# Patient Record
Sex: MALE | Race: WHITE | NOT HISPANIC OR LATINO | Employment: FULL TIME | URBAN - METROPOLITAN AREA
[De-identification: names, ages, dates, MRNs, and addresses within clinical notes are randomized per-mention and may not be internally consistent; named-entity substitution may affect disease eponyms.]

---

## 2017-01-12 ENCOUNTER — TELEPHONE (OUTPATIENT)
Dept: FAMILY MEDICINE | Facility: CLINIC | Age: 57
End: 2017-01-12

## 2017-01-12 NOTE — TELEPHONE ENCOUNTER
RN hypertension panel management  Offer free BP CK with the RN  Left message for the patient to call the clinic.  Kristal ESCAMILLA RN

## 2017-01-12 NOTE — Clinical Note
January 13, 2017      Kwabena Richards  [address] [facility] [address]            Dear Kwabena,    Your most recent blood pressure reading preformed at our clinic was higher than we like to see it. The goal is to have it under 140/90 in clinic. Please call our clinic [phone] to schedule a blood pressure recheck on our RN schedule. This appointment is free of charge and takes about 15 minutes to complete. Be sure to take all of your blood pressure medications, and avoid stimulants like caffeine, cold medicines, sudafed, or tobacco prior to your recheck.    If your blood pressure medication was changed by your provider recently wait 2 weeks before making this recheck appointment.     Thank you for trusting us with your health care.  Sincerely,    ARCHANA Evangelista / Kristal ESCAMILLA RN

## 2017-02-16 ENCOUNTER — OFFICE VISIT (OUTPATIENT)
Dept: FAMILY MEDICINE | Facility: CLINIC | Age: 57
End: 2017-02-16
Payer: COMMERCIAL

## 2017-02-16 ENCOUNTER — OFFICE VISIT (OUTPATIENT)
Dept: BEHAVIORAL HEALTH | Facility: CLINIC | Age: 57
End: 2017-02-16
Payer: COMMERCIAL

## 2017-02-16 VITALS
BODY MASS INDEX: 33.59 KG/M2 | DIASTOLIC BLOOD PRESSURE: 86 MMHG | TEMPERATURE: 100.3 F | WEIGHT: 209 LBS | HEART RATE: 72 BPM | SYSTOLIC BLOOD PRESSURE: 136 MMHG | HEIGHT: 66 IN

## 2017-02-16 DIAGNOSIS — Z12.11 COLON CANCER SCREENING: ICD-10-CM

## 2017-02-16 DIAGNOSIS — I10 HYPERTENSION, GOAL BELOW 140/90: ICD-10-CM

## 2017-02-16 DIAGNOSIS — F33.0 MAJOR DEPRESSIVE DISORDER, RECURRENT EPISODE, MILD (H): Primary | ICD-10-CM

## 2017-02-16 DIAGNOSIS — F43.23 ADJUSTMENT DISORDER WITH MIXED ANXIETY AND DEPRESSED MOOD: Primary | ICD-10-CM

## 2017-02-16 PROCEDURE — 99214 OFFICE O/P EST MOD 30 MIN: CPT | Performed by: NURSE PRACTITIONER

## 2017-02-16 PROCEDURE — 90791 PSYCH DIAGNOSTIC EVALUATION: CPT | Performed by: SOCIAL WORKER

## 2017-02-16 RX ORDER — BUPROPION HYDROCHLORIDE 150 MG/1
150 TABLET ORAL EVERY MORNING
Qty: 30 TABLET | Refills: 11 | Status: SHIPPED | OUTPATIENT
Start: 2017-02-16 | End: 2018-02-07

## 2017-02-16 RX ORDER — LATANOPROST 50 UG/ML
1 SOLUTION/ DROPS OPHTHALMIC EVERY MORNING
COMMUNITY
Start: 2017-02-04 | End: 2024-06-07

## 2017-02-16 ASSESSMENT — ANXIETY QUESTIONNAIRES
7. FEELING AFRAID AS IF SOMETHING AWFUL MIGHT HAPPEN: NOT AT ALL
GAD7 TOTAL SCORE: 2
5. BEING SO RESTLESS THAT IT IS HARD TO SIT STILL: NOT AT ALL
1. FEELING NERVOUS, ANXIOUS, OR ON EDGE: NOT AT ALL
6. BECOMING EASILY ANNOYED OR IRRITABLE: SEVERAL DAYS
3. WORRYING TOO MUCH ABOUT DIFFERENT THINGS: SEVERAL DAYS
IF YOU CHECKED OFF ANY PROBLEMS ON THIS QUESTIONNAIRE, HOW DIFFICULT HAVE THESE PROBLEMS MADE IT FOR YOU TO DO YOUR WORK, TAKE CARE OF THINGS AT HOME, OR GET ALONG WITH OTHER PEOPLE: SOMEWHAT DIFFICULT
2. NOT BEING ABLE TO STOP OR CONTROL WORRYING: NOT AT ALL

## 2017-02-16 ASSESSMENT — PATIENT HEALTH QUESTIONNAIRE - PHQ9: 5. POOR APPETITE OR OVEREATING: NOT AT ALL

## 2017-02-16 NOTE — NURSING NOTE
"Chief Complaint   Patient presents with     Depression     Hypertension       Initial /90 (BP Location: Left arm, Cuff Size: Adult Large)  Pulse 72  Temp 100.3  F (37.9  C) (Oral)  Ht 5' 5.75\" (1.67 m)  Wt 209 lb (94.8 kg)  BMI 33.99 kg/m2 Estimated body mass index is 33.99 kg/(m^2) as calculated from the following:    Height as of this encounter: 5' 5.75\" (1.67 m).    Weight as of this encounter: 209 lb (94.8 kg).  Medication Reconciliation: complete  "

## 2017-02-16 NOTE — PROGRESS NOTES
"[facility]  Integrated Behavioral Health Services   Diagnostic Assessment      PATIENT'S NAME: Kwabena Richards  MRN:   [id number]  :   1960  DATE OF SERVICE: 2017  SERVICE LOCATION: Face to Face in Clinic  Visit Activities: [facility], [facility] Only and Warm-handoff       Identifying Information:  Patient is a 56 year old year old, ,  male.  Patient attended the session alone.        Referral:  Patient was referred for an assessment by PCP at [facility].   Reason for referral: clarify behavioral health diagnosis, determine behavioral health treatment options, assess client readiness and motivation to change, assess client social situation and address the interaction of behavioral and medical issues.       Patient's Statement of Presenting Concern:  Patient reports the following reason(s) for seeking an assessment at this time: due to increased anger/irritability at work.  Patient stated that his symptoms have resulted in the following functional impairments: health maintenance, relationship(s), social interactions and work / vocational responsibilities      History of Presenting Concern:  Patient reports that these problem(s) began after his father  when pt was 27 yrs old.  Recently relationships have changed in his workplace is that and that has led to increased irritation and frustration. Patient has attempted to resolve these concerns in the past through: counseling and physician / PCP. Patient reports that other professional(s) are involved in providing support / services. His primary care provider      Social History:  Patient reported he grew up in [place], [place]. He is the only child and has 2 stepsisters and one stepbrother.  Patient reported that his childhood was \"nice\".  Patient reported a history of one committed relationships or marriages. Patient has been  for 30 years. Patient reported having 3 children. Patient " identified some stable and meaningful social connections.     Patient lives with Wife.  Patient is currently employed full time.  Work history [employer] -20+ years    Patient reported that he has not been involved with the legal system.  Patient's highest education level was high school graduate and some college. Patient did not identify any learning problems. Patient did not serve in the .       Mental Health History:  Patient reported the following biological family members or relatives with mental health issues: Father-possibly depression. Patient has received the following mental health services in the past: counseling and medication(s) from physician / PCP. Patient is currently receiving the following services: physician / PCP, medication(s) from physician / PCP and primary care behavioral health provider.      Chemical Health History:  Patient reported no family history of chemical health issues. Patient has not received chemical dependency treatment in the past. Patient is not currently receiving any chemical dependency treatment. Patient reports no problems as a result of their drinking / drug use.      Cage-AID score is: Negative. Based on Cage-Aid score and clinical interview there  are not indications of drug or alcohol abuse.      Discussed the general effects of drugs and alcohol on health and well-being.      Significant Losses / Trauma / Abuse / Neglect Issues:  There are indications or report of significant loss, trauma, abuse or neglect issues related to: death of Father age 56 of cancer-patient was 27 years old.    Issues of possible neglect are not present.      Medical History:     See patient medical record for problem list and current medications.      Medication Adherence:  Client reports taking prescribed medications as prescribed.    Patient was provided recommendation to follow-up with physician.    Mental Status Assessment:  Appearance:   Appropriate   Eye  Contact:   Good   Psychomotor Behavior: Normal   Attitude:   Cooperative   Orientation:   All  Speech   Rate / Production: Normal    Volume:  Loud   Mood:    Anxious  Normal Sad   Affect:    Appropriate  tearful   Thought Content:  Clear   Thought Form:  Coherent  Logical   Insight:    Good       Safety Issues and Plan for Safety and Risk Management:  Patient denies a history of suicidal ideation, suicide attempts, self-injurious behavior, homicidal ideation, homicidal behavior and and other safety concerns  Patient denies current fears or concerns for personal safety.  Patient denies current or recent suicidal ideation or behaviors.  Patient denies current or recent homicidal ideation or behaviors.  Patient denies current or recent self injurious behavior or ideation.  Patient denies other safety concerns.  Patient reports there are firearms in the house. The firearms are secured in a locked space  A safety and risk management plan has not been developed at this time, however client was given the after-hours number / 911 should there be a change in any of these risk factors.        Review of Symptoms:  Depression: Energy Hopeless  Nina:  No symptoms  Psychosis: No symptoms  Anxiety: Worries Irritability-increased, many worry thoughts  Panic:  No symptoms  Post Traumatic Stress Disorder: No symptoms  Obsessive Compulsive Disorder: No symptoms  Eating Disorder: No symptoms  Oppositional Defiant Disorder: No symptoms  ADD / ADHD: No symptoms  Conduct Disorder: No symptoms    Patient's Strengths and Limitations:  Client identified the following strengths or resources that will help him succeed in counseling: commitment to health and well being, friends / good social support, family support, intelligence and sense of humor. Client identified the following supports: family and friends. Things that may interfere with the clients success in counseling include:lack of social support.    Diagnostic Criteria:  A. The  development of emotional or behavioral symptoms in response to an identifiable stressor(s) occurring within 3 months of the onset of the stressor(s)  B. These symptoms or behaviors are clinically significant, as evidenced by one or both of the following:  C. The stress-related disturbance does not meet criteria for another disorder & is not not an exacerbation of another mental disorder  D. The symptoms do not represent normal bereavement  E. Once the stressor or its consequences have terminated, the symptoms do not persist for more than an additional 6 months       * Adjustment Disorder with Mixed Anxiety and Depressed Mood: The predominant manifestation is a combination of depression and anxiety      Functional Status:  Client's symptoms have caused and are causing reduced functional status in the following areas: Occupational / Vocational - Conflicts in the workplace      DSM5 Diagnoses: (Sustained by DSM5 Criteria Listed Above)  Diagnoses: Adjustment Disorders  309.28 (F43.23) With mixed anxiety and depressed mood  Psychosocial & Contextual Factors: Recent conflicts in the workplace  WHODAS Score: 19  See Media section of EPIC medical record for completed WHODAS    Preliminary Treatment Plan:    Initial Treatment will focus on: Depressed Mood - Decrease  Anxiety - Decrease  Adjustment Difficulties related to: Relationships and work environment  Relational Problems related to: Conflict or difficulties with Cowork[employer]  Anger Management - Increase coping behaviors.    Chemical dependency recommendations: No indications of CD issues    As a preliminary treatment goal, patient will experience a reduction in depressed mood, will develop more effective coping skills to manage depressive symptoms, will develop healthy cognitive patterns and beliefs, will increase ability to function adaptively and will continue to take medications as prescribed / participate in supportive activities and services , will experience a  reduction in anxiety, will develop more effective coping skills to manage anxiety symptoms, will develop healthy cognitive patterns and beliefs and will increase ability to function adaptively, will develop coping/problem-solving skills to facilitate more adaptive adjustment, will address relationship difficulties in a more adaptive manner and will learn strategies to resolve conflict adaptively and will learn and practice positive anger management skills .    The focus of initial interventions will be to alleviate anxiety, alleviate depressed mood, facilitate appropriate expression of feelings, teach anger management techniques, teach communication skills, teach conflict management skills, teach relaxation strategies, teach social skills and teach stress mangement techniques.    The client is receiving treatment / structured support from the following professional(s) / service and treatment. Collaboration will be initiated with: primary care physician.    The following referral(s) was/were discussed but client declines follow up at this time. Assess as needed.    A Release of Information is not needed at this time.    Report to child or adult protection services was SONNY Melissa (Mindy),[facility] Behavioral Health Clinician

## 2017-02-16 NOTE — PROGRESS NOTES
"  SUBJECTIVE:                                                    Kwabena Richards is a 56 year old male who presents to clinic today for the following health issues:      Hypertension Follow-up      Outpatient blood pressures are not being checked.    Low Salt Diet:  Tries to stay away from salt       Depression Followup    Status since last visit: Worsened     See PHQ-9 for current symptoms.  Other associated symptoms: Anger issues- at work    No anger issues in any other situation - only at work.    \"flew off the handle\" last week and got sent home.    Has been on cymbalta for years with good control.    Complicating factors:   Significant life event:  No   Current substance abuse:  None  Anxiety or Panic symptoms:  No             Amount of exercise or physical activity: None    Problems taking medications regularly: No    Medication side effects: none          Problem list and histories reviewed & adjusted, as indicated.  Additional history: as documented    Problem list, Medication list, Allergies, and Medical/Social/Surgical histories reviewed in EPIC and updated as appropriate.    ROS:  Constitutional, HEENT, cardiovascular, pulmonary, gi and gu systems are negative, except as otherwise noted.    OBJECTIVE:                                                    /86  Pulse 72  Temp 100.3  F (37.9  C) (Oral)  Ht 5' 5.75\" (1.67 m)  Wt 209 lb (94.8 kg)  BMI 33.99 kg/m2  Body mass index is 33.99 kg/(m^2).  GENERAL: healthy, alert and no distress  RESP: lungs clear to auscultation - no rales, rhonchi or wheezes  CV: regular rate and rhythm, normal S1 S2, no S3 or S4, no murmur, click or rub, no peripheral edema and peripheral pulses strong  PSYCH: mentation appears normal, affect normal/bright    PHQ-9 SCORE 1/11/2016 8/29/2016 2/16/2017   Total Score - - -   Total Score 2 1 2     VISHNU-7 SCORE 1/11/2016 8/29/2016 2/16/2017   Total Score - - -   Total Score 0 1 2            ASSESSMENT/PLAN:                             "                            ICD-10-CM    1. Major depressive disorder, recurrent episode, mild (H) F33.0 Poorly controlled - anger issues at work.  Continue Cymbalta  Add buPROPion (WELLBUTRIN XL) 150 MG 24 hr tablet     MENTAL HEALTH REFERRAL - was able to get an appointment with Kristi today.     2. Hypertension, goal below 140/90 I10 Well controlled.     3. Colon cancer screening Z12.11 GASTROENTEROLOGY ADULT REF PROCEDURE ONLY       Patient Instructions   Continue Cymbalta  Add Wellbutrin 150 mg daily  Make appointment for counseling with Kristi Person.    Follow up with me in one month if no improvement.        The risks, benefits and treatment options of prescribed medications or other treatments have been discussed with the patient. The patient verbalized their understanding and should call or follow up if no improvement or if they develop further problems.    STU Maya [facility]

## 2017-02-16 NOTE — MR AVS SNAPSHOT
After Visit Summary   2/16/2017    Kwabena Richards    MRN: [id number]           Patient Information     Date Of Birth          1960        Visit Information        Provider Department      2/16/2017 8:00 AM Shraddha Loyd APRN [facility]        Today's Diagnoses     Major depressive disorder, recurrent episode, mild (H)    -  1    Hypertension, goal below 140/90        Colon cancer screening          Care Instructions    Continue Cymbalta  Add Wellbutrin 150 mg daily  Make appointment for counseling with Kristi Person.    Follow up with me in one month if no improvement.      Thank you for choosing [facility].  You may be receiving a survey in the mail from [employer] [facility] regarding your visit today.  Please take a few minutes to complete and return the survey to let us know how we are doing.      If you have questions or concerns, please contact us via [employer] or you can contact your care team at [phone].    Our Clinic hours are:  Monday 6:40 am  to 7:00 pm  Tuesday -Friday 6:40 am to 5:00 pm    The [place] outpatient lab hours are:  Monday - Friday 6:10 am to 4:45 pm  Saturdays 7:00 am to 11:00 am  Appointments are required, call [phone]    If you have clinical questions after hours or would like to schedule an appointment,  call the clinic at [phone].          Follow-ups after your visit        Additional Services     GASTROENTEROLOGY ADULT REF PROCEDURE ONLY       Last Lab Result: Creatinine (mg/dL)       Date                     Value                 06/02/2016               0.81             ----------  Body mass index is 33.99 kg/(m^2).     Needed:  No  Language:  English    Patient will be contacted to schedule procedure.     Please be aware that coverage of these services is subject to the terms and limitations of your health insurance plan.  Call member services at your health plan with any benefit or coverage questions.  Any  procedures must be performed at a [facility] facility OR coordinated by your clinic's referral office.    Please bring the following with you to your appointment:    (1) Any X-Rays, CTs or MRIs which have been performed.  Contact the facility where they were done to arrange for  prior to your scheduled appointment.    (2) List of current medications   (3) This referral request   (4) Any documents/labs given to you for this referral            MENTAL HEALTH REFERRAL       Your provider has referred you to: [facility]: [facility] - Counseling (Individual/Couples/Family) - [facility] [phone]   [email or web address]   *Patient will be contacted by [facility]'s scheduling partner, [facility]), to schedule an appointment.  Patients may also call [facility] to schedule.    All scheduling is subject to the client's specific insurance plan & benefits, provider/location availability, and provider clinical specialities.  Please arrive 15 minutes early for your first appointment and bring your completed paperwork.    Please be aware that coverage of these services is subject to the terms and limitations of your health insurance plan.  Call member services at your health plan with any benefit or coverage questions.                  Who to contact     If you have questions or need follow up information about today's clinic visit or your schedule please contact [facility] directly at [phone].  Normal or non-critical lab and imaging results will be communicated to you by MyChart, letter or phone within 4 business days after the clinic has received the results. If you do not hear from us within 7 days, please contact the clinic through MyChart or phone. If you have a critical or abnormal lab result, we will notify you by phone as soon as possible.  Submit refill requests through [employer] or call your pharmacy and  "they will forward the refill request to us. Please allow 3 business days for your refill to be completed.          Additional Information About Your Visit        [employer] Information     [employer] lets you send messages to your doctor, view your test results, renew your prescriptions, schedule appointments and more. To sign up, go to [email or web address] . Click on \"Log in\" on the left side of the screen, which will take you to the Welcome page. Then click on \"Sign up Now\" on the right side of the page.     You will be asked to enter the access code listed below, as well as some personal information. Please follow the directions to create your username and password.     Your access code is: [email or web address]  Expires: 2017  8:26 AM     Your access code will  in 90 days. If you need help or a new code, please call your [facility] clinic or [phone].        Care EveryWhere ID     This is your [facility] EveryWhere ID. This could be used by other organizations to access your [facility] medical records  [id number]        Your Vitals Were     Pulse Temperature Height BMI (Body Mass Index)          72 100.3  F (37.9  C) (Oral) 5' 5.75\" (1.67 m) 33.99 kg/m2         Blood Pressure from Last 3 Encounters:   17 136/90   16 150/84   16 133/83    Weight from Last 3 Encounters:   17 209 lb (94.8 kg)   16 214 lb (97.1 kg)   16 224 lb (101.6 kg)              We Performed the Following     GASTROENTEROLOGY ADULT REF PROCEDURE ONLY     MENTAL HEALTH REFERRAL          Today's Medication Changes          These changes are accurate as of: 17  8:26 AM.  If you have any questions, ask your nurse or doctor.               Start taking these medicines.        Dose/Directions    buPROPion 150 MG 24 hr tablet   Commonly known as:  WELLBUTRIN XL   Used for:  Major depressive disorder, recurrent episode, mild (H)   Started by:  Shraddha Loyd APRN CNP        Dose:  150 mg   Take 1 tablet " (150 mg) by mouth every morning   Quantity:  30 tablet   Refills:  11            Where to get your medicines      These medications were sent to [facility] [place], [place] - [address]  [address] [address]     Phone:  [phone]     buPROPion 150 MG 24 hr tablet                Primary Care Provider Office Phone # Fax #    Shraddha Batista, [facility]U [facility] [phone]       [facility] [address] [address]        Thank you!     Thank you for choosing [facility]  for your care. Our goal is always to provide you with excellent care. Hearing back from our patients is one way we can continue to improve our services. Please take a few minutes to complete the written survey that you may receive in the mail after your visit with us. Thank you!             Your Updated Medication List - Protect others around you: Learn how to safely use, store and throw away your medicines at www.disposemymeds.org.          This list is accurate as of: 2/16/17  8:26 AM.  Always use your most recent med list.                   Brand Name Dispense Instructions for use    aspirin 81 MG chewable tablet      (ON HOLD WHILE ON COUMADIN)1 TABLET BY MOUTH DAILY       buPROPion 150 MG 24 hr tablet    WELLBUTRIN XL    30 tablet    Take 1 tablet (150 mg) by mouth every morning       celecoxib 100 MG capsule    celeBREX    180 capsule    Take 2 capsules (200 mg) by mouth daily       Co Q 10 100 MG Caps      Take 1 capsule by mouth daily       DULoxetine 60 MG EC capsule    CYMBALTA    90 capsule    Take 1 capsule (60 mg) by mouth daily       EQL FISH OIL 1000 MG capsule   Generic drug:  fish oil-omega-3 fatty acids     180 capsule    Take 3 capsules by mouth daily.       fluticasone 50 MCG/ACT spray    FLONASE    3 Package    Spray 1-2 sprays into both nostrils daily       Garlic 2 MG Caps      Take 1 capsule by mouth daily       GLUCOSAMINE CHONDROITIN  COMPLX PO          latanoprost 0.005 % ophthalmic solution    XALATAN         losartan 50 MG tablet    COZAAR    90 tablet    Take 1 tablet (50 mg) by mouth daily       MULTIPLE VITAMIN PO      1 TABLET ORALLY DAILY       order for DME      Respironics Remstar 60 series auto CPAP 5-15 cm H2O       TYLENOL PM EXTRA STRENGTH PO      Take  by mouth as needed.       vitamin B complex with vitamin C Tabs tablet      Take 1 tablet by mouth daily       VITAMIN D PO          ZINC PO      1 TABLET ORALLY DAILY

## 2017-02-16 NOTE — MR AVS SNAPSHOT
"              After Visit Summary   2017    Kwabena Richards    MRN: [id number]           Patient Information     Date Of Birth          1960        Visit Information        Provider Department      2017 9:00 AM Gabby Person MSW [facility]        Today's Diagnoses     Adjustment disorder with mixed anxiety and depressed mood    -  1       Follow-ups after your visit        Who to contact     If you have questions or need follow up information about today's clinic visit or your schedule please contact [facility] directly at [phone].  Normal or non-critical lab and imaging results will be communicated to you by reQwiphart, letter or phone within 4 business days after the clinic has received the results. If you do not hear from us within 7 days, please contact the clinic through Emergent Viewst or phone. If you have a critical or abnormal lab result, we will notify you by phone as soon as possible.  Submit refill requests through Venuelabs or call your pharmacy and they will forward the refill request to us. Please allow 3 business days for your refill to be completed.          Additional Information About Your Visit        MyChart Information     Venuelabs lets you send messages to your doctor, view your test results, renew your prescriptions, schedule appointments and more. To sign up, go to [email or web address] . Click on \"Log in\" on the left side of the screen, which will take you to the Welcome page. Then click on \"Sign up Now\" on the right side of the page.     You will be asked to enter the access code listed below, as well as some personal information. Please follow the directions to create your username and password.     Your access code is: [email or web address]  Expires: 2017  8:26 AM     Your access code will  in 90 days. If you need help or a new code, please call your [facility] or [phone].        Care EveryWhere ID     This is your Care EveryWhere ID. " This could be used by other organizations to access your [place] medical records  [id number]         Blood Pressure from Last 3 Encounters:   02/16/17 136/86   08/29/16 150/84   03/29/16 133/83    Weight from Last 3 Encounters:   02/16/17 209 lb (94.8 kg)   08/29/16 214 lb (97.1 kg)   03/29/16 224 lb (101.6 kg)              Today, you had the following     No orders found for display         Today's Medication Changes          These changes are accurate as of: 2/16/17 11:30 AM.  If you have any questions, ask your nurse or doctor.               Start taking these medicines.        Dose/Directions    buPROPion 150 MG 24 hr tablet   Commonly known as:  WELLBUTRIN XL   Used for:  Major depressive disorder, recurrent episode, mild (H)   Started by:  Shraddha Loyd APRN CNP        Dose:  150 mg   Take 1 tablet (150 mg) by mouth every morning   Quantity:  30 tablet   Refills:  11            Where to get your medicines      These medications [facility] [place] [facility] [address]  [address] [address]     Phone:  [phone]     buPROPion 150 MG 24 hr tablet                Primary Care Provider Office Phone # Fax #    STU Younger [phone]       [facility] [address] [address]        Thank you!     Thank you for choosing [facility]  for your care. Our goal is always to provide you with excellent care. Hearing back from our patients is one way we can continue to improve our services. Please take a few minutes to complete the written survey that you may receive in the mail after your visit with us. Thank you!             Your Updated Medication List - Protect others around you: Learn how to safely use, store and throw away your medicines at www.disposemymeds.org.          This list is accurate as of: 2/16/17 11:30 AM.  Always use your most recent med list.                    Brand Name Dispense Instructions for use    aspirin 81 MG chewable tablet      (ON HOLD WHILE ON COUMADIN)1 TABLET BY MOUTH DAILY       buPROPion 150 MG 24 hr tablet    WELLBUTRIN XL    30 tablet    Take 1 tablet (150 mg) by mouth every morning       celecoxib 100 MG capsule    celeBREX    180 capsule    Take 2 capsules (200 mg) by mouth daily       Co Q 10 100 MG Caps      Take 1 capsule by mouth daily       DULoxetine 60 MG EC capsule    CYMBALTA    90 capsule    Take 1 capsule (60 mg) by mouth daily       EQL FISH OIL 1000 MG capsule   Generic drug:  fish oil-omega-3 fatty acids     180 capsule    Take 3 capsules by mouth daily.       fluticasone 50 MCG/ACT spray    FLONASE    3 Package    Spray 1-2 sprays into both nostrils daily       Garlic 2 MG Caps      Take 1 capsule by mouth daily       GLUCOSAMINE CHONDROITIN COMPLX PO          latanoprost 0.005 % ophthalmic solution    XALATAN         losartan 50 MG tablet    COZAAR    90 tablet    Take 1 tablet (50 mg) by mouth daily       MULTIPLE VITAMIN PO      1 TABLET ORALLY DAILY       order for DME      Respironics Remstar 60 series auto CPAP 5-15 cm H2O       TYLENOL PM EXTRA STRENGTH PO      Take  by mouth as needed.       vitamin B complex with vitamin C Tabs tablet      Take 1 tablet by mouth daily       VITAMIN D PO          ZINC PO      1 TABLET ORALLY DAILY

## 2017-02-16 NOTE — PATIENT INSTRUCTIONS
Continue Cymbalta  Add Wellbutrin 150 mg daily  Make appointment for counseling with Kristi Person.    Follow up with me in one month if no improvement.      Thank you for choosing [facility].  You may be receiving a survey in the mail from Moira Coy regarding your visit today.  Please take a few minutes to complete and return the survey to let us know how we are doing.      If you have questions or concerns, please contact us via Grovac or you can contact your care team at [phone].    Our Clinic hours are:  Monday 6:40 am  to 7:00 pm  Tuesday -Friday 6:40 am to 5:00 pm    The [place] outpatient lab hours are:  Monday - Friday 6:10 am to 4:45 pm  Saturdays 7:00 am to 11:00 am  Appointments are required, call [phone]    If you have clinical questions after hours or would like to schedule an appointment,  call the clinic at [phone].

## 2017-02-17 ASSESSMENT — PATIENT HEALTH QUESTIONNAIRE - PHQ9: SUM OF ALL RESPONSES TO PHQ QUESTIONS 1-9: 2

## 2017-02-17 ASSESSMENT — ANXIETY QUESTIONNAIRES: GAD7 TOTAL SCORE: 2

## 2017-02-27 ENCOUNTER — OFFICE VISIT (OUTPATIENT)
Dept: BEHAVIORAL HEALTH | Facility: CLINIC | Age: 57
End: 2017-02-27
Payer: COMMERCIAL

## 2017-02-27 DIAGNOSIS — F43.23 ADJUSTMENT DISORDER WITH MIXED ANXIETY AND DEPRESSED MOOD: Primary | ICD-10-CM

## 2017-02-27 PROCEDURE — 90832 PSYTX W PT 30 MINUTES: CPT | Performed by: SOCIAL WORKER

## 2017-02-27 ASSESSMENT — ANXIETY QUESTIONNAIRES
IF YOU CHECKED OFF ANY PROBLEMS ON THIS QUESTIONNAIRE, HOW DIFFICULT HAVE THESE PROBLEMS MADE IT FOR YOU TO DO YOUR WORK, TAKE CARE OF THINGS AT HOME, OR GET ALONG WITH OTHER PEOPLE: NOT DIFFICULT AT ALL
7. FEELING AFRAID AS IF SOMETHING AWFUL MIGHT HAPPEN: NOT AT ALL
GAD7 TOTAL SCORE: 0
2. NOT BEING ABLE TO STOP OR CONTROL WORRYING: NOT AT ALL
5. BEING SO RESTLESS THAT IT IS HARD TO SIT STILL: NOT AT ALL
1. FEELING NERVOUS, ANXIOUS, OR ON EDGE: NOT AT ALL
3. WORRYING TOO MUCH ABOUT DIFFERENT THINGS: NOT AT ALL
6. BECOMING EASILY ANNOYED OR IRRITABLE: NOT AT ALL

## 2017-02-27 ASSESSMENT — PATIENT HEALTH QUESTIONNAIRE - PHQ9: 5. POOR APPETITE OR OVEREATING: NOT AT ALL

## 2017-02-27 NOTE — MR AVS SNAPSHOT
"              After Visit Summary   2017    Kwabena Richards    MRN: [id number]           Patient Information     Date Of Birth          1960        Visit Information        Provider Department      2017 3:00 PM Gabby Person LICSW [facility]        Today's Diagnoses     Adjustment disorder with mixed anxiety and depressed mood    -  1       Follow-ups after your visit        Who to contact     If you have questions or need follow up information about today's clinic visit or your schedule please contact [facility] directly at [phone].  Normal or non-critical lab and imaging results will be communicated to you by House Partyhart, letter or phone within 4 business days after the clinic has received the results. If you do not hear from us within 7 days, please contact the clinic through McPhyt or phone. If you have a critical or abnormal lab result, we will notify you by phone as soon as possible.  Submit refill requests through [employer] or call your pharmacy and they will forward the refill request to us. Please allow 3 business days for your refill to be completed.          Additional Information About Your Visit        MyChart Information     [employer] lets you send messages to your doctor, view your test results, renew your prescriptions, schedule appointments and more. To sign up, go to [email or web address] . Click on \"Log in\" on the left side of the screen, which will take you to the Welcome page. Then click on \"Sign up Now\" on the right side of the page.     You will be asked to enter the access code listed below, as well as some personal information. Please follow the directions to create your username and password.     Your access code is: [email or web address]  Expires: 2017  8:26 AM     Your access code will  in 90 days. If you need help or a new code, please call your [facility] or [phone].        Care EveryWhere ID     This is your Care EveryWhere " ID. This could be used by other organizations to access your [place] medical records  [id number]         Blood Pressure from Last 3 Encounters:   02/16/17 136/86   08/29/16 150/84   03/29/16 133/83    Weight from Last 3 Encounters:   02/16/17 209 lb (94.8 kg)   08/29/16 214 lb (97.1 kg)   03/29/16 224 lb (101.6 kg)              Today, you had the following     No orders found for display       Primary Care Provider Office Phone # Fax #    Shraddha Collins Cecil Loyd, STU [phone]       [facility] [address] [address]        Thank you!     Thank you for choosing [facility]  for your care. Our goal is always to provide you with excellent care. Hearing back from our patients is one way we can continue to improve our services. Please take a few minutes to complete the written survey that you may receive in the mail after your visit with us. Thank you!             Your Updated Medication List - Protect others around you: Learn how to safely use, store and throw away your medicines at www.disposemymeds.org.          This list is accurate as of: 2/27/17  7:16 PM.  Always use your most recent med list.                   Brand Name Dispense Instructions for use    aspirin 81 MG chewable tablet      (ON HOLD WHILE ON COUMADIN)1 TABLET BY MOUTH DAILY       buPROPion 150 MG 24 hr tablet    WELLBUTRIN XL    30 tablet    Take 1 tablet (150 mg) by mouth every morning       celecoxib 100 MG capsule    celeBREX    180 capsule    Take 2 capsules (200 mg) by mouth daily       Co Q 10 100 MG Caps      Take 1 capsule by mouth daily       DULoxetine 60 MG EC capsule    CYMBALTA    90 capsule    Take 1 capsule (60 mg) by mouth daily       EQL FISH OIL 1000 MG capsule   Generic drug:  fish oil-omega-3 fatty acids     180 capsule    Take 3 capsules by mouth daily.       fluticasone 50 MCG/ACT spray    FLONASE    3 Package    Spray 1-2 sprays into both nostrils daily        Garlic 2 MG Caps      Take 1 capsule by mouth daily       GLUCOSAMINE CHONDROITIN COMPLX PO          latanoprost 0.005 % ophthalmic solution    XALATAN         losartan 50 MG tablet    COZAAR    90 tablet    Take 1 tablet (50 mg) by mouth daily       MULTIPLE VITAMIN PO      1 TABLET ORALLY DAILY       order for DME      Respironics Remstar 60 series auto CPAP 5-15 cm H2O       TYLENOL PM EXTRA STRENGTH PO      Take  by mouth as needed.       vitamin B complex with vitamin C Tabs tablet      Take 1 tablet by mouth daily       VITAMIN D PO          ZINC PO      1 TABLET ORALLY DAILY

## 2017-02-28 ENCOUNTER — DOCUMENTATION ONLY (OUTPATIENT)
Dept: SLEEP MEDICINE | Facility: CLINIC | Age: 57
End: 2017-02-28
Payer: COMMERCIAL

## 2017-02-28 ENCOUNTER — OFFICE VISIT (OUTPATIENT)
Dept: SLEEP MEDICINE | Facility: CLINIC | Age: 57
End: 2017-02-28
Payer: COMMERCIAL

## 2017-02-28 VITALS
WEIGHT: 207.6 LBS | BODY MASS INDEX: 34.59 KG/M2 | HEART RATE: 67 BPM | DIASTOLIC BLOOD PRESSURE: 96 MMHG | OXYGEN SATURATION: 97 % | SYSTOLIC BLOOD PRESSURE: 146 MMHG | HEIGHT: 65 IN

## 2017-02-28 DIAGNOSIS — G47.33 OSA (OBSTRUCTIVE SLEEP APNEA): Primary | ICD-10-CM

## 2017-02-28 PROCEDURE — 99214 OFFICE O/P EST MOD 30 MIN: CPT | Performed by: FAMILY MEDICINE

## 2017-02-28 ASSESSMENT — PATIENT HEALTH QUESTIONNAIRE - PHQ9: SUM OF ALL RESPONSES TO PHQ QUESTIONS 1-9: 1

## 2017-02-28 ASSESSMENT — ANXIETY QUESTIONNAIRES: GAD7 TOTAL SCORE: 0

## 2017-02-28 NOTE — PROGRESS NOTES
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 HE DIDN'T WANT TO TRY ON ANY OTHER MASKS.  HIS MASK OF CHOICE WAS AIRFIT F20 MEDIUM.  WENT OVER SUPPLY REPLACEMENT SCHEDULE.

## 2017-02-28 NOTE — MR AVS SNAPSHOT
After Visit Summary   2/28/2017    Kwabena Richards    MRN: [id number]           Patient Information     Date Of Birth          1960        Visit Information        Provider Department      2/28/2017 4:00 PM Eleazar Prince MD [facility]        Today's Diagnoses     RAÚL (obstructive sleep apnea)    -  1      Care Instructions      Your BMI is Body mass index is 34.81 kg/(m^2).  Weight management is a personal decision.  If you are interested in exploring weight loss strategies, the following discussion covers the approaches that may be successful. Body mass index (BMI) is one way to tell whether you are at a healthy weight, overweight, or obese. It measures your weight in relation to your height.  A BMI of 18.5 to 24.9 is in the healthy range. A person with a BMI of 25 to 29.9 is considered overweight, and someone with a BMI of 30 or greater is considered obese. More than two-thirds of American adults are considered overweight or obese.  Being overweight or obese increases the risk for further weight gain. Excess weight may lead to heart disease and diabetes.  Creating and following plans for healthy eating and physical activity may help you improve your health.  Weight control is part of healthy lifestyle and includes exercise, emotional health, and healthy eating habits. Careful eating habits lifelong are the mainstay of weight control. Though there are significant health benefits from weight loss, long-term weight loss with diet alone may be very difficult to achieve- studies show long-term success with dietary management in less than 10% of people. Attaining a healthy weight may be especially difficult to achieve in those with severe obesity. In some cases, medications, devices and surgical management might be considered.  What can you do?  If you are overweight or obese and are interested in methods for weight loss, you should discuss this with your provider.      Consider reducing daily calorie intake by 500 calories.     Keep a food journal.     Avoiding skipping meals, consider cutting portions instead.    Diet combined with exercise helps maintain muscle while optimizing fat loss. Strength training is particularly important for building and maintaining muscle mass. Exercise helps reduce stress, increase energy, and improves fitness. Increasing exercise without diet control, however, may not burn enough calories to loose weight.       Start walking three days a week 10-20 minutes at a time    Work towards walking thirty minutes five days a week     Eventually, increase the speed of your walking for 1-2 minutes at time    In addition, we recommend that you review healthy lifestyles and methods for weight loss available through the National Institutes of Health patient information sites:  http://win.niddk.nih.gov/publications/index.htm    And look into health and wellness programs that may be available through your health insurance provider, employer, local community center, or raisa club.    Weight management plan: Patient was referred to their PCP to discuss a diet and exercise plan.           Follow-ups after your visit        Follow-up notes from your care team     Return in about 1 year (around 2/28/2018), or if symptoms worsen or fail to improve.      Who to contact     If you have questions or need follow up information about today's clinic visit or your schedule please contact [facility] directly at [phone].  Normal or non-critical lab and imaging results will be communicated to you by MyChart, letter or phone within 4 business days after the clinic has received the results. If you do not hear from us within 7 days, please contact the clinic through MyChart or phone. If you have a critical or abnormal lab result, we will notify you by phone as soon as possible.  Submit refill requests through HelloSign or call your pharmacy and they will  "forward the refill request to us. Please allow 3 business days for your refill to be completed.          Additional Information About Your Visit        MyChart Information     DocSperat lets you send messages to your doctor, view your test results, renew your prescriptions, schedule appointments and more. To sign up, go to [email or web address] . Click on \"Log in\" on the left side of the screen, which will take you to the Welcome page. Then click on \"Sign up Now\" on the right side of the page.     You will be asked to enter the access code listed below, as well as some personal information. Please follow the directions to create your username and password.     Your access code is: [email or web address]  Expires: 2017  8:26 AM     Your access code will  in 90 days. If you need help or a new code, please call your [place] clinic or [phone].        Care EveryWhere ID     This is your Care EveryWhere ID. This could be used by other organizations to access your [place] medical records  [id number]        Your Vitals Were     Pulse Height Pulse Oximetry BMI (Body Mass Index)          67 1.645 m (5' 4.75\") 97% 34.81 kg/m2         Blood Pressure from Last 3 Encounters:   17 (!) 146/96   17 136/86   16 150/84    Weight from Last 3 Encounters:   17 94.2 kg (207 lb 9.6 oz)   17 94.8 kg (209 lb)   16 97.1 kg (214 lb)              We Performed the Following     Comprehensive DME        Primary Care Provider Office Phone # Fax #    Shraddha STU Porras [phone]       [facility] [address] [address]        Thank you!     Thank you for choosing [facility]  for your care. Our goal is always to provide you with excellent care. Hearing back from our patients is one way we can continue to improve our services. Please take a few minutes to complete the written survey that you may receive in the mail after your " visit with us. Thank you!             Your Updated Medication List - Protect others around you: Learn how to safely use, store and throw away your medicines at www.disposemymeds.org.          This list is accurate as of: 2/28/17  4:22 PM.  Always use your most recent med list.                   Brand Name Dispense Instructions for use    aspirin 81 MG chewable tablet      (ON HOLD WHILE ON COUMADIN)1 TABLET BY MOUTH DAILY       buPROPion 150 MG 24 hr tablet    WELLBUTRIN XL    30 tablet    Take 1 tablet (150 mg) by mouth every morning       celecoxib 100 MG capsule    celeBREX    180 capsule    Take 2 capsules (200 mg) by mouth daily       Co Q 10 100 MG Caps      Take 1 capsule by mouth daily       DULoxetine 60 MG EC capsule    CYMBALTA    90 capsule    Take 1 capsule (60 mg) by mouth daily       EQL FISH OIL 1000 MG capsule   Generic drug:  fish oil-omega-3 fatty acids     180 capsule    Take 3 capsules by mouth daily.       fluticasone 50 MCG/ACT spray    FLONASE    3 Package    Spray 1-2 sprays into both nostrils daily       Garlic 2 MG Caps      Take 1 capsule by mouth daily       GLUCOSAMINE CHONDROITIN COMPLX PO          latanoprost 0.005 % ophthalmic solution    XALATAN         losartan 50 MG tablet    COZAAR    90 tablet    Take 1 tablet (50 mg) by mouth daily       MULTIPLE VITAMIN PO      1 TABLET ORALLY DAILY       order for DME      Respironics Remstar 60 series auto CPAP 5-15 cm H2O       TYLENOL PM EXTRA STRENGTH PO      Take  by mouth as needed.       vitamin B complex with vitamin C Tabs tablet      Take 1 tablet by mouth daily       VITAMIN D PO          ZINC PO      1 TABLET ORALLY DAILY

## 2017-02-28 NOTE — PROGRESS NOTES
"[facility]  February 27, 2017      Behavioral Health Clinician Progress Note    Patient Name: Kwabena Richards           Service Type: Individual      Service Location:  in clinic      Session Start Time: 305pm   Session End Time: 335pm      Session Length: 16 - 37      Attendees: Patient    Visit Activities (Refresh list every visit): [facility] Only    Diagnostic Assessment Date: 2-  Treatment Plan Review Date: not completed  See Flowsheets for today's PHQ-9 and VISHNU-7 results  Previous PHQ-9:   PHQ-9 SCORE 8/29/2016 2/16/2017 2/27/2017   Total Score - - -   Total Score 1 2 1     Previous VISHNU-7:   VISHNU-7 SCORE 8/29/2016 2/16/2017 2/27/2017   Total Score - - -   Total Score 1 2 0       ZULEYMA LEVEL:  ZULEYMA Score (Last Two) 5/12/2014 2/16/2017   ZULEYMA Raw Score 42 34   Activation Score 66 68.9   ZULEYMA Level 3 3       DATA  Extended Session (60+ minutes): No  Interactive Complexity: No  Crisis: No    Treatment Objective(s) Addressed in This Session:  Target Behavior(s): disease management/lifestyle changes decrease anxiety and depression due to events    Depressed Mood: Decrease frequency and intensity of feeling down, depressed, hopeless  Anxiety: will develop healthy cognitive patterns and beliefs and will increase ability to function adaptively  Adjustment Difficulties: will develop coping/problem-solving skills to facilitate more adaptive adjustment    Current Stressors / Issues:  Pt reports stressors have decreased at work.His irritability has decreased. He has been practicing breathing exercises and notices that it is helpful.  Focused on increasing non-judgmental thinking, taking breaks before he \"needs\" them, and focusing on self-care.       Progress on Treatment Objective(s) / Homework:  none established      Situation     Other make errors at work. Has to take time to correct them   Automatic Thoughts  Cognitive Distortions   They are not paying attention and don't care how long it takes to " correct errors   Feelings   Irritated - sad     Behavior   Yells -sometimes uses inappropriate language - unacceptable at work     Questioning Thoughts Maybe others don't realize how much their mistakes affect me         Care Plan review completed: No    Medication Review:  No current psychiatric medications prescribed    Medication Compliance:  Yes    Changes in Health Issues:   None reported    Chemical Use Review:   Substance Use: Chemical use reviewed, no active concerns identified      Tobacco Use: No current tobacco use.      Assessment: Current Emotional / Mental Status (status of significant symptoms):  Risk status (Self / Other harm or suicidal ideation)  Patient denies a history of suicidal ideation, suicide attempts, self-injurious behavior, homicidal ideation, homicidal behavior and and other safety concerns  Patient denies current fears or concerns for personal safety.  Patient denies current or recent suicidal ideation or behaviors.  Patient denies current or recent homicidal ideation or behaviors.  Patient denies current or recent self injurious behavior or ideation.  Patient denies other safety concerns.  A safety and risk management plan has not been developed at this time, however patient was encouraged to call [place] [id number] should there be a change in any of these risk factors.    Appearance:   Appropriate   Eye Contact:   Good   Psychomotor Behavior: Normal   Attitude:   Cooperative   Orientation:   All  Speech   Rate / Production: Normal    Volume:  Normal   Mood:    Normal  Affect:    Appropriate   Thought Content:  Clear   Thought Form:  Coherent  Logical   Insight:    Good     Diagnoses:  1. Adjustment disorder with mixed anxiety and depressed mood        Collateral Reports Completed:  Communicated with: PCP    Plan: (Homework, other):  Patient was encouraged to schedule a follow up appointment with the clinic [facility] 2-3 weeks or sooner if needed.  He was also given Cognitive Behavioral  Therapy skills to practice when experiencing anxiety and depression- increased irritability.  CD Recommendations: No indications of CD issues. Gabby Person [facility], C

## 2017-02-28 NOTE — PROGRESS NOTES
Sleep Follow-Up Visit:    Date on this visit: 2/28/2017    Kwabena Richards comes in today for annual follow-up of severe RAÚL with possible sustained hypoxemia and/or REM hypoventilation (HST 12/4/2013 with AHI 36.7, je desat 75%, baseline SpO2 at ~90%) treated with auto-titrate CPAP 12-17 cm H2O. Titration PSG strongly advised, but patient declined.  1/14/2014 - CPAP compliance follow-up.  He returns today and feels the CPAP is working well, no snoring or stopping breathing. Uses it every night, only occasional issues with mask leak.  CPAP download from 12/14/2013 - 1/12/2014 on auto-titrate CPAP 5-15 cm H2O. Average daily usage of 7:26 and used >= 4 hours on 100% of nights. Pressure 90th%ile of 14.2 cm H2O. AHI 10.8.  A/P to change to auto-titrate 12-17 cm H2O, overnight oximetry on CPAP.  1/27/2014 - LADAN on CPAP.  SpO2 normal and stable on current CPAP settings.  Today - Returns for follow-up to get new supplies.  Did not bring CPAP for download today.  He feels it continues to work well, wears on a nightly basis.  No residual snoring or apnea.  No new cardiopulmonary concerns.    Problem List:  Patient Active Problem List    Diagnosis Date Noted     Mild major depression (H) 12/28/2011     Priority: High     Better on cymbalta        DJD (degenerative joint disease) of knee 08/07/2009     Priority: High     Non morbid obesity due to excess calories 08/29/2016     Priority: Medium     Hyperlipidemia LDL goal <130 05/13/2014     Priority: Medium     RAÚL (obstructive sleep apnea) 12/05/2013     Priority: Medium     Severe RAÚL with possible sustained hypoxemia and/or REM hypoventilation   - 12/4/2013 with AHI 36.7, je desat 75%, period at end of study suspicious for REM with significant increase in obstructive events and sustained hypoxemia.  Basal SpO2 also low-normal at ~90%.       Mixed hyperlipidemia 11/11/2013     Priority: Medium     Obesity 11/11/2013     Priority: Medium     Status post left partial  knee replacement 10/16/2013     Priority: Medium     2009         Osteoarthritis of multiple joints 10/16/2013     Priority: Medium     Hypertension, goal below 140/90 10/12/2012     Priority: Medium     Essential hypertension 12/28/2011     Priority: Medium     Rhinitis medicamentosa 06/28/2010     Priority: Medium     Diverticulosis of large intestine 02/10/2005     Priority: Medium     Colonoscopy 10/04  No polyps    Problem list name updated by automated process. Provider to review          Impression/Plan:    1.) Severe RAÚL with possible sustained hypoxemia and/or REM hypoventilation, currently treated with auto-titrate CPAP 12-17 cm H2O  - 12/4/2013 with AHI 36.7, je desat 75%, period at end of study suspicious for REM with significant increase in obstructive events and sustained hypoxemia. Basal SpO2 also low-normal at ~90%.   - Recommended all-night PAP titration, but patient declined.  - Per patient, well controlled on current settings with normal LADAN on these settings performed 1/27/2014.  - New supplies today, recommended bring memory card for download.    He will follow up with me in about 1 year(s).     Twenty-five minutes spent with patient, all of which were spent face-to-face counseling, consulting, coordinating plan of care.      Eleazar Prince MD    CC: Shraddha Loyd

## 2017-03-03 ENCOUNTER — TELEPHONE (OUTPATIENT)
Dept: FAMILY MEDICINE | Facility: CLINIC | Age: 57
End: 2017-03-03

## 2017-03-03 RX ORDER — AMOXICILLIN 500 MG/1
2000 CAPSULE ORAL ONCE
Qty: 4 CAPSULE | Refills: 0 | Status: SHIPPED | OUTPATIENT
Start: 2017-03-03 | End: 2017-03-03

## 2017-03-03 NOTE — LETTER
March 7, 2017      Kwabena Richards  [address] [facility] [address]              Dear Kwabena,    Your most recent blood pressure reading preformed at our clinic was higher than we like to see it. The goal is to have it under 140/90 in clinic. Please call our clinic [phone] to schedule a blood pressure recheck on our RN schedule. This appointment is free of charge and takes about 15 minutes to complete. Be sure to take all of your blood pressure medications, and avoid stimulants like caffeine, cold medicines, sudafed, or tobacco prior to your recheck.    If your blood pressure medication was changed by your provider recently wait 2 weeks before making this recheck appointment.     Thank you for trusting us with your health care.  Sincerely,    Heaven Baltazar NP/ Kristal ESCAMILLA RN

## 2017-03-03 NOTE — TELEPHONE ENCOUNTER
S-(situation): prophylactic antibiotic for dental appt - crown    B-(background): Patient had knee replacement 2009.  In the past he has taken (4) 500mg amoxicillin prior to dental procedures.  Snapshot does not list any heart conditions/valve replacements.  Recommend prophylactic med prior to dental procedure?    A-(assessment): prophylactic antibiotic    R-(recommendations): Please advise.  Pharm ready.  Routing to provider.  Juli CHAIREZ RN

## 2017-03-03 NOTE — TELEPHONE ENCOUNTER
Reason for Call:  Other med request    Detailed comments: Patient had a knee replacement in 2009 and has been taking Amox 500 mg (4) before dental appointments.  He is currently out of medication and has an appointment for a crown in two weeks.  [facility]    Phone Number Patient can be reached at: Cell number on file:    Telephone Information:   Mobile [phone]       Best Time: any    Can we leave a detailed message on this number? YES    Call taken on 3/3/2017 at 11:57 AM by Laurence Moon

## 2017-03-23 DIAGNOSIS — Z96.659 STATUS POST TOTAL KNEE REPLACEMENT, UNSPECIFIED LATERALITY: Primary | ICD-10-CM

## 2017-03-23 NOTE — TELEPHONE ENCOUNTER
Last filled 03-   Last qty 4      Margaret Wylie [facility]  Certified Pharmacy Technician  Phone:[phone]  Fax:[phone]

## 2017-03-24 RX ORDER — AMOXICILLIN 500 MG/1
TABLET, FILM COATED ORAL
Qty: 4 TABLET | Refills: 0 | Status: SHIPPED | OUTPATIENT
Start: 2017-03-24 | End: 2018-03-27

## 2017-03-24 NOTE — TELEPHONE ENCOUNTER
Patient notified.  Patient reports that he has more than one dental procedure coming up and he has Abx for 1 dental visit on 3/27/17.  He reports his dentist will send in Abx Rx on 3/27/17 if needed.   Patient reports that he will notify PCP if Abx is something that needs a rotating refill for dental procedures.    Bhavani CHAIREZ Rn

## 2017-03-24 NOTE — TELEPHONE ENCOUNTER
Total knee 2009.  Shraddha filled 3/3/17.  Pt is anxious to fill for dental prophylaxis.  No history of heart valve surgery.  Routed to provider pool.    Danna Gray RN

## 2017-03-29 ENCOUNTER — OFFICE VISIT (OUTPATIENT)
Dept: BEHAVIORAL HEALTH | Facility: CLINIC | Age: 57
End: 2017-03-29
Payer: COMMERCIAL

## 2017-03-29 DIAGNOSIS — F33.0 MILD EPISODE OF RECURRENT MAJOR DEPRESSIVE DISORDER (H): Primary | ICD-10-CM

## 2017-03-29 PROCEDURE — 90832 PSYTX W PT 30 MINUTES: CPT | Performed by: SOCIAL WORKER

## 2017-03-29 ASSESSMENT — ANXIETY QUESTIONNAIRES
1. FEELING NERVOUS, ANXIOUS, OR ON EDGE: NOT AT ALL
6. BECOMING EASILY ANNOYED OR IRRITABLE: NOT AT ALL
7. FEELING AFRAID AS IF SOMETHING AWFUL MIGHT HAPPEN: NOT AT ALL
5. BEING SO RESTLESS THAT IT IS HARD TO SIT STILL: NOT AT ALL
4. TROUBLE RELAXING: NOT AT ALL
2. NOT BEING ABLE TO STOP OR CONTROL WORRYING: NOT AT ALL
GAD7 TOTAL SCORE: 0
3. WORRYING TOO MUCH ABOUT DIFFERENT THINGS: NOT AT ALL
IF YOU CHECKED OFF ANY PROBLEMS ON THIS QUESTIONNAIRE, HOW DIFFICULT HAVE THESE PROBLEMS MADE IT FOR YOU TO DO YOUR WORK, TAKE CARE OF THINGS AT HOME, OR GET ALONG WITH OTHER PEOPLE: NOT DIFFICULT AT ALL

## 2017-03-29 NOTE — PROGRESS NOTES
"[facility]  March 29, 2017      Behavioral Health Clinician Progress Note    Patient Name: Kwabena Richards           Service Type: Individual      Service Location:  in clinic      Session Start Time: 305pm   Session End Time: 335pm      Session Length: 16 - 37      Attendees: Patient    Visit Activities (Refresh list every visit): [facility] [place] Only    Diagnostic Assessment Date: 2-  Treatment Plan Review Date: not completed  See Flowsheets for today's PHQ-9 and VISHNU-7 results  Previous PHQ-9:   PHQ-9 SCORE 8/29/2016 2/16/2017 2/27/2017   Total Score - - -   Total Score 1 2 1     Previous VISHNU-7:   VISHNU-7 SCORE 8/29/2016 2/16/2017 2/27/2017   Total Score - - -   Total Score 1 2 0       ZULEYMA LEVEL:  ZULEYMA Score (Last Two) 5/12/2014 2/16/2017   ZULEYMA Raw Score 42 34   Activation Score 66 68.9   ZULEYMA Level 3 3       DATA  Extended Session (60+ minutes): No  Interactive Complexity: No  Crisis: No    Treatment Objective(s) Addressed in This Session:  Target Behavior(s): disease management/lifestyle changes decrease anxiety and depression due to events    Depressed Mood: Decrease frequency and intensity of feeling down, depressed, hopeless  Anxiety: will develop healthy cognitive patterns and beliefs and will increase ability to function adaptively  Adjustment Difficulties: will develop coping/problem-solving skills to facilitate more adaptive adjustment    Current Stressors / Issues:  Pt reports work has been going well since last appt. .His irritability has decreased. He continues practicing breathing exercises and notices that it is helpful.  Focused on increasing non-judgmental thinking, taking breaks before he \"needs\" them, and focusing on self-care. Discussed relationships at home - trying to be less irritable.       Progress on Treatment Objective(s) / Homework:  New Objective established this session - ACTION (Actively working towards change); Intervened by reinforcing change plan / affirming " steps taken    Care Plan review completed: yes  Medication Review:  No changes to current psychiatric medication(s)    Medication Compliance:  Yes    Changes in Health Issues:   None reported    Chemical Use Review:   Substance Use: Chemical use reviewed, no active concerns identified      Tobacco Use: No current tobacco use.      Assessment: Current Emotional / Mental Status (status of significant symptoms):  Risk status (Self / Other harm or suicidal ideation)  Patient denies a history of suicidal ideation, suicide attempts, self-injurious behavior, homicidal ideation, homicidal behavior and and other safety concerns  Patient denies current fears or concerns for personal safety.  Patient denies current or recent suicidal ideation or behaviors.  Patient denies current or recent homicidal ideation or behaviors.  Patient denies current or recent self injurious behavior or ideation.  Patient denies other safety concerns.  A safety and risk management plan has not been developed at this time, however patient was encouraged to call [place] [phone] should there be a change in any of these risk factors.    Appearance:   Appropriate   Eye Contact:   Good   Psychomotor Behavior: Normal   Attitude:   Cooperative   Orientation:   All  Speech   Rate / Production: Normal    Volume:  Normal   Mood:    Irritable  Normal Sad   Affect:    Appropriate   Thought Content:  Clear   Thought Form:  Coherent  Logical   Insight:    Good     Diagnoses:  1. Mild episode of recurrent major depressive disorder (H)      A) Recurrent episode(s) - symptoms have been present during the same 2-week period and represent a change from previous functioning 5 or more symptoms (required for diagnosis)   - Depressed mood. Note: In children and adolescents, can be irritable mood.     - Diminished interest or pleasure in all, or almost all, activities.    - Fatigue or loss of energy.    - Feelings of worthlessness or inappropriate and excessive guilt.     - Diminished ability to think or concentrate, or indecisiveness.   B) The symptoms cause clinically significant distress or impairment in social, occupational, or other important areas of functioning  C) The episode is not attributable to the physiological effects of a substance or to another medical condition  D) The occurence of major depressive episode is not better explained by other thought / psychotic disorders  E) There has never been a manic episode or hypomanic episode    Collateral Reports Completed:  Communicated with: PCP    Plan: (Homework, other):  Patient was encouraged to schedule a follow up appointment with the clinic [facility] 2-3 weeks or sooner if needed.  He was also given Cognitive Behavioral Therapy skills to practice when experiencing anxiety and depression- increased irritability.  CD Recommendations: No indications of CD issues. BRUNO Chiang, [facility]     ______________________________________________________________________    Integrated Primary Care Behavioral Health Treatment Plan    Patient's Name: Kwabena Richards  YOB: 1960    Date: March 29, 2017    DSM-V Diagnoses: 296.31 Major Depressive Disorder, Recurrent Episode, Mild _ and With anxious distress  Psychosocial / Contextual Factors: long hx of depression    WHODAS: see DA    Referral / Collaboration:  The following referral(s) was/were discussed but client declines follow up at this time. continue to assess as needed..    Anticipated number of session or this episode of care: 6-8      MeasurableTreatment Goal(s) related to diagnosis / functional impairment(s)  Goal 1: Patient will decrease depression by 50% as indicated by PHQ9 and self-report    I will know I've met my goal when I feel happier - more content.      Objective #A (Patient Action)    Patient will Decrease frequency and intensity of feeling down, depressed, hopeless.  Status: New - Date: 3-     Intervention(s)  [facility] will assign homework as  discussed in session  teach emotional recognition/identification. increase effective coping with painful emotions.    Objective #B  Patient will Identify negative self-talk and behaviors: challenge core beliefs, myths, and actions.  Status: New - Date: 3-     Intervention(s)  [facility] will assign homework as discussed in session.    Patient has reviewed and agreed to the above plan.    Written by  Gabby Person [facility], [facility]

## 2017-03-29 NOTE — MR AVS SNAPSHOT
"              After Visit Summary   3/29/2017    Kwabena Richards    MRN: [id number]           Patient Information     Date Of Birth          1960        Visit Information        Provider Department      3/29/2017 3:00 PM Gabby Person LICSW [facility]        Today's Diagnoses     Mild episode of recurrent major depressive disorder (H)    -  1       Follow-ups after your visit        Who to contact     If you have questions or need follow up information about today's clinic visit or your schedule please contact [facility] directly at [phone].  Normal or non-critical lab and imaging results will be communicated to you by Weekend-[employer]-gogohart, letter or phone within 4 business days after the clinic has received the results. If you do not hear from us within 7 days, please contact the clinic through [employer] or phone. If you have a critical or abnormal lab result, we will notify you by phone as soon as possible.  Submit refill requests through [employer] or call your pharmacy and they will forward the refill request to us. Please allow 3 business days for your refill to be completed.          Additional Information About Your Visit        MyChart Information     [employer] lets you send messages to your doctor, view your test results, renew your prescriptions, schedule appointments and more. To sign up, go to [email or web address] . Click on \"Log in\" on the left side of the screen, which will take you to the Welcome page. Then click on \"Sign up Now\" on the right side of the page.     You will be asked to enter the access code listed below, as well as some personal information. Please follow the directions to create your username and password.     Your access code is: [email or web address]  Expires: 2017  9:26 AM     Your access code will  in 90 days. If you need help or a new code, please call your [facility] or [phone].        Care EveryWhere ID     This is your Care EveryWhere ID. " This could be used by other organizations to access your [place] medical records  [id number]         Blood Pressure from Last 3 Encounters:   02/28/17 (!) 146/96   02/16/17 136/86   08/29/16 150/84    Weight from Last 3 Encounters:   02/28/17 207 lb 9.6 oz (94.2 kg)   02/16/17 209 lb (94.8 kg)   08/29/16 214 lb (97.1 kg)              Today, you had the following     No orders found for display       Primary Care Provider Office Phone # Fax #    Shraddha Collins STU Delgado [phone]       [facility] [address] [facility] [address]        Thank you!     Thank you for choosing [facility]  for your care. Our goal is always to provide you with excellent care. Hearing back from our patients is one way we can continue to improve our services. Please take a few minutes to complete the written survey that you may receive in the mail after your visit with us. Thank you!             Your Updated Medication List - Protect others around you: Learn how to safely use, store and throw away your medicines at www.disposemymeds.org.          This list is accurate as of: 3/29/17  4:45 PM.  Always use your most recent med list.                   Brand Name Dispense Instructions for use    amoxicillin 500 MG tablet    AMOXIL    4 tablet    Take 4 tablets X 1 dose prior to dental procedure.       aspirin 81 MG chewable tablet      (ON HOLD WHILE ON COUMADIN)1 TABLET BY MOUTH DAILY       buPROPion 150 MG 24 hr tablet    WELLBUTRIN XL    30 tablet    Take 1 tablet (150 mg) by mouth every morning       celecoxib 100 MG capsule    celeBREX    180 capsule    Take 2 capsules (200 mg) by mouth daily       Co Q 10 100 MG Caps      Take 1 capsule by mouth daily       DULoxetine 60 MG EC capsule    CYMBALTA    90 capsule    Take 1 capsule (60 mg) by mouth daily       EQL FISH OIL 1000 MG capsule   Generic drug:  fish oil-omega-3 fatty acids     180 capsule    Take 3 capsules by mouth  daily.       fluticasone 50 MCG/ACT spray    FLONASE    3 Package    Spray 1-2 sprays into both nostrils daily       Garlic 2 MG Caps      Take 1 capsule by mouth daily       GLUCOSAMINE CHONDROITIN COMPLX PO          latanoprost 0.005 % ophthalmic solution    XALATAN         losartan 50 MG tablet    COZAAR    90 tablet    Take 1 tablet (50 mg) by mouth daily       MULTIPLE VITAMIN PO      1 TABLET ORALLY DAILY       order for DME      Respironics Remstar 60 series auto CPAP 5-15 cm H2O       TYLENOL PM EXTRA STRENGTH PO      Take  by mouth as needed.       vitamin B complex with vitamin C Tabs tablet      Take 1 tablet by mouth daily       VITAMIN D PO          ZINC PO      1 TABLET ORALLY DAILY

## 2017-03-30 ASSESSMENT — PATIENT HEALTH QUESTIONNAIRE - PHQ9: SUM OF ALL RESPONSES TO PHQ QUESTIONS 1-9: 1

## 2017-03-30 ASSESSMENT — ANXIETY QUESTIONNAIRES: GAD7 TOTAL SCORE: 0

## 2017-07-24 DIAGNOSIS — M17.0 PRIMARY OSTEOARTHRITIS OF BOTH KNEES: ICD-10-CM

## 2017-07-25 ENCOUNTER — TELEPHONE (OUTPATIENT)
Dept: FAMILY MEDICINE | Facility: CLINIC | Age: 57
End: 2017-07-25

## 2017-07-28 RX ORDER — CELECOXIB 100 MG/1
200 CAPSULE ORAL DAILY
Qty: 180 CAPSULE | Refills: 0 | Status: SHIPPED | OUTPATIENT
Start: 2017-07-28 | End: 2017-11-16

## 2017-07-31 NOTE — TELEPHONE ENCOUNTER
Patient returned our call and I gave him the message. I also told him his Rx was ready in the pharmacy.  Twila Manriquez  [facility]  [facility]

## 2017-08-23 DIAGNOSIS — F33.0 MAJOR DEPRESSIVE DISORDER, RECURRENT EPISODE, MILD (H): ICD-10-CM

## 2017-08-23 DIAGNOSIS — I10 ESSENTIAL HYPERTENSION: ICD-10-CM

## 2017-08-23 RX ORDER — DULOXETIN HYDROCHLORIDE 60 MG/1
60 CAPSULE, DELAYED RELEASE ORAL DAILY
Qty: 90 CAPSULE | Refills: 0 | Status: SHIPPED | OUTPATIENT
Start: 2017-08-23 | End: 2017-11-16

## 2017-08-23 RX ORDER — LOSARTAN POTASSIUM 50 MG/1
50 TABLET ORAL DAILY
Qty: 90 TABLET | Refills: 1 | Status: SHIPPED | OUTPATIENT
Start: 2017-08-23 | End: 2018-03-01

## 2017-08-23 NOTE — TELEPHONE ENCOUNTER
Cozaar      Last Written Prescription Date: 08/21/2016  Last Fill Quantity: 90, # refills: 3  Last Office Visit with [facility] or  Health prescribing provider: 02/16/2017       Potassium   Date Value Ref Range Status   06/02/2016 4.1 3.4 - 5.3 mmol/L Final     Creatinine   Date Value Ref Range Status   06/02/2016 0.81 0.66 - 1.25 mg/dL Final     BP Readings from Last 3 Encounters:   02/28/17 (!) 146/96   02/16/17 136/86   08/29/16 150/84           Cymbalta    Last Written Prescription Date: 08/29/2016  Last Fill Quantity: 90, # refills: 3  Last Office Visit with [facility] or [facility] prescribing provider: 02/16/2017        BP Readings from Last 3 Encounters:   02/28/17 (!) 146/96   02/16/17 136/86   08/29/16 150/84     Pulse: (for [place])  Creatinine   Date Value Ref Range Status   06/02/2016 0.81 0.66 - 1.25 mg/dL Final   ]    Last PHQ-9 score on record=   PHQ-9 SCORE 3/29/2017   Total Score -   Total Score 1

## 2017-10-17 ENCOUNTER — OFFICE VISIT (OUTPATIENT)
Dept: FAMILY MEDICINE | Facility: CLINIC | Age: 57
End: 2017-10-17
Payer: COMMERCIAL

## 2017-10-17 VITALS
WEIGHT: 212 LBS | DIASTOLIC BLOOD PRESSURE: 80 MMHG | HEIGHT: 65 IN | TEMPERATURE: 97.2 F | HEART RATE: 65 BPM | SYSTOLIC BLOOD PRESSURE: 135 MMHG | BODY MASS INDEX: 35.32 KG/M2

## 2017-10-17 DIAGNOSIS — E78.5 HYPERLIPIDEMIA LDL GOAL <130: ICD-10-CM

## 2017-10-17 DIAGNOSIS — I10 HYPERTENSION, GOAL BELOW 140/90: ICD-10-CM

## 2017-10-17 DIAGNOSIS — M25.561 ACUTE PAIN OF RIGHT KNEE: Primary | ICD-10-CM

## 2017-10-17 LAB
ANION GAP SERPL CALCULATED.3IONS-SCNC: 6 MMOL/L (ref 3–14)
BUN SERPL-MCNC: 18 MG/DL (ref 7–30)
CALCIUM SERPL-MCNC: 8.6 MG/DL (ref 8.5–10.1)
CHLORIDE SERPL-SCNC: 109 MMOL/L (ref 94–109)
CHOLEST SERPL-MCNC: 152 MG/DL
CO2 SERPL-SCNC: 23 MMOL/L (ref 20–32)
CREAT SERPL-MCNC: 0.92 MG/DL (ref 0.66–1.25)
GFR SERPL CREATININE-BSD FRML MDRD: 85 ML/MIN/1.7M2
GLUCOSE SERPL-MCNC: 97 MG/DL (ref 70–99)
HDLC SERPL-MCNC: 35 MG/DL
LDLC SERPL CALC-MCNC: 69 MG/DL
NONHDLC SERPL-MCNC: 117 MG/DL
POTASSIUM SERPL-SCNC: 4.2 MMOL/L (ref 3.4–5.3)
SODIUM SERPL-SCNC: 138 MMOL/L (ref 133–144)
TRIGL SERPL-MCNC: 242 MG/DL

## 2017-10-17 PROCEDURE — 20610 DRAIN/INJ JOINT/BURSA W/O US: CPT | Performed by: FAMILY MEDICINE

## 2017-10-17 PROCEDURE — 36415 COLL VENOUS BLD VENIPUNCTURE: CPT | Performed by: NURSE PRACTITIONER

## 2017-10-17 PROCEDURE — 80048 BASIC METABOLIC PNL TOTAL CA: CPT | Performed by: NURSE PRACTITIONER

## 2017-10-17 PROCEDURE — 80061 LIPID PANEL: CPT | Performed by: NURSE PRACTITIONER

## 2017-10-17 PROCEDURE — 99213 OFFICE O/P EST LOW 20 MIN: CPT | Mod: 25 | Performed by: FAMILY MEDICINE

## 2017-10-17 RX ORDER — TRIAMCINOLONE ACETONIDE 40 MG/ML
40 INJECTION, SUSPENSION INTRA-ARTICULAR; INTRAMUSCULAR ONCE
Qty: 1 ML | Refills: 0 | OUTPATIENT
Start: 2017-10-17 | End: 2017-10-17

## 2017-10-17 RX ORDER — LIDOCAINE HYDROCHLORIDE 10 MG/ML
INJECTION, SOLUTION INFILTRATION; PERINEURAL
Qty: 1 ML | Refills: 0 | OUTPATIENT
Start: 2017-10-17 | End: 2018-03-27

## 2017-10-17 ASSESSMENT — ANXIETY QUESTIONNAIRES
2. NOT BEING ABLE TO STOP OR CONTROL WORRYING: NOT AT ALL
5. BEING SO RESTLESS THAT IT IS HARD TO SIT STILL: NOT AT ALL
1. FEELING NERVOUS, ANXIOUS, OR ON EDGE: NOT AT ALL
7. FEELING AFRAID AS IF SOMETHING AWFUL MIGHT HAPPEN: NOT AT ALL
GAD7 TOTAL SCORE: 0
3. WORRYING TOO MUCH ABOUT DIFFERENT THINGS: NOT AT ALL
6. BECOMING EASILY ANNOYED OR IRRITABLE: NOT AT ALL

## 2017-10-17 ASSESSMENT — PATIENT HEALTH QUESTIONNAIRE - PHQ9
5. POOR APPETITE OR OVEREATING: NOT AT ALL
SUM OF ALL RESPONSES TO PHQ QUESTIONS 1-9: 0

## 2017-10-17 NOTE — MR AVS SNAPSHOT
After Visit Summary   10/17/2017    Kwabena Richards    MRN: [id number]           Patient Information     Date Of Birth          1960        Visit Information        Provider Department      10/17/2017 6:40 AM Lynnette Clark MD [facility]        Today's Diagnoses     Acute pain of right knee    -  1    Hypertension, goal below 140/90        Hyperlipidemia LDL goal <130          Care Instructions          Thank you for choosing [facility].  You may be receiving a survey in the mail from Moira Coy regarding your visit today.  Please take a few minutes to complete and return the survey to let us know how we are doing.      If you have questions or concerns, please contact us via [employer] or you can contact your care team at [phone].    Our Clinic hours are:  Monday 6:40 am  to 7:00 pm  Tuesday -Friday 6:40 am to 5:00 pm    The [place] outpatient lab hours are:  Monday - Friday 6:10 am to 4:45 pm  Saturdays 7:00 am to 11:00 am  Appointments are required, call [phone]    If you have clinical questions after hours or would like to schedule an appointment,  call the clinic at [phone].          Follow-ups after your visit        Who to contact     If you have questions or need follow up information about today's clinic visit or your schedule please contact [facility] directly at [phone].  Normal or non-critical lab and imaging results will be communicated to you by Collectahart, letter or phone within 4 business days after the clinic has received the results. If you do not hear from us within 7 days, please contact the clinic through Arlediat or phone. If you have a critical or abnormal lab result, we will notify you by phone as soon as possible.  Submit refill requests through [employer] or call your pharmacy and they will forward the refill request to us. Please allow 3 business days for your refill to be completed.          Additional Information  "About Your Visit        [employer] Information     Quoteroller lets you send messages to your doctor, view your test results, renew your prescriptions, schedule appointments and more. To sign up, go to [email or web address] . Click on \"Log in\" on the left side of the screen, which will take you to the Welcome page. Then click on \"Sign up Now\" on the right side of the page.     You will be asked to enter the access code listed below, as well as some personal information. Please follow the directions to create your username and password.     Your access code is: [email or web address]  Expires: 1/15/2018  8:07 AM     Your access code will  in 90 days. If you need help or a new code, please call your [place] clinic or [phone].        Care EveryWhere ID     This is your Care EveryWhere ID. This could be used by other organizations to access your [place] medical records  [id number]        Your Vitals Were     Pulse Temperature Height BMI (Body Mass Index)          65 97.2  F (36.2  C) (Tympanic) 5' 4.75\" (1.645 m) 35.55 kg/m2         Blood Pressure from Last 3 Encounters:   10/17/17 135/80   17 (!) 146/96   17 136/86    Weight from Last 3 Encounters:   10/17/17 212 lb (96.2 kg)   17 207 lb 9.6 oz (94.2 kg)   17 209 lb (94.8 kg)              We Performed the Following     Basic metabolic panel  (Ca, Cl, CO2, Creat, Gluc, K, Na, BUN)     DEPRESSION ACTION PLAN (DAP)     DRAIN/INJECT LARGE JOINT/BURSA     Kenalog 40 MG  []     Lidocaine 1% or 2%     []     Lipid panel reflex to direct LDL          Today's Medication Changes          These changes are accurate as of: 10/17/17  8:07 AM.  If you have any questions, ask your nurse or doctor.               Start taking these medicines.        Dose/Directions    lidocaine 1 % injection   Used for:  Acute pain of right knee   Started by:  Lynnette Clark MD        Patient was seen in clinic for cortisone inj   Quantity:  1 mL   Refills: "  0       triamcinolone acetonide 40 MG/ML injection   Commonly known as:  KENALOG   Used for:  Acute pain of right knee   Started by:  Lynnette Clark MD        Dose:  40 mg   1 mL (40 mg) by INTRA-ARTICULAR route once for 1 dose   Quantity:  1 mL   Refills:  0            Where to get your medicines      Some of these will need a paper prescription and others can be bought over the counter.  Ask your nurse if you have questions.     You don't need a prescription for these medications     lidocaine 1 % injection    triamcinolone acetonide 40 MG/ML injection                Primary Care Provider Office Phone # Fax #    Shraddha Yehaaron Loyd, APRN [facility] [phone] [address] [address]        Equal Access to Services     LEESA GRIDER : Cipriano marcumo Solyly, waaxda luqadaha, qaybta kaalmada adeegyada, luis gonzalez . So [facility] [phone].    ATENCIÓN: Si habla español, tiene a hector disposición servicios gratuitos de asistencia lingüística. Llame al [phone].    We comply with applicable federal civil rights laws and [place] laws. We do not discriminate on the basis of race, color, national origin, age, disability, sex, sexual orientation, or gender identity.            Thank you!     Thank you for choosing [facility]  for your care. Our goal is always to provide you with excellent care. Hearing back from our patients is one way we can continue to improve our services. Please take a few minutes to complete the written survey that you may receive in the mail after your visit with us. Thank you!             Your Updated Medication List - Protect others around you: Learn how to safely use, store and throw away your medicines at www.disposemymeds.org.          This list is accurate as of: 10/17/17  8:07 AM.  Always use your most recent med list.                   Brand Name Dispense Instructions for use Diagnosis     amoxicillin 500 MG tablet    AMOXIL    4 tablet    Take 4 tablets X 1 dose prior to dental procedure.    Status post total knee replacement, unspecified laterality       aspirin 81 MG chewable tablet      (ON HOLD WHILE ON COUMADIN)1 TABLET BY MOUTH DAILY        buPROPion 150 MG 24 hr tablet    WELLBUTRIN XL    30 tablet    Take 1 tablet (150 mg) by mouth every morning    Major depressive disorder, recurrent episode, mild (H)       celecoxib 100 MG capsule    celeBREX    180 capsule    Take 2 capsules (200 mg) by mouth daily    Primary osteoarthritis of both knees       Co Q 10 100 MG Caps      Take 1 capsule by mouth daily    Mixed hyperlipidemia       DULoxetine 60 MG EC capsule    CYMBALTA    90 capsule    Take 1 capsule (60 mg) by mouth daily    Major depressive disorder, recurrent episode, mild (H)       EQL FISH OIL 1000 MG capsule   Generic drug:  fish oil-omega-3 fatty acids     180 capsule    Take 3 capsules by mouth daily.        fluticasone 50 MCG/ACT spray    FLONASE    3 Package    Spray 1-2 sprays into both nostrils daily    Nasal congestion       Garlic 2 MG Caps      Take 1 capsule by mouth daily    HTN (hypertension)       GLUCOSAMINE CHONDROITIN COMPLX PO           latanoprost 0.005 % ophthalmic solution    XALATAN          lidocaine 1 % injection     1 mL    Patient was seen in clinic for cortisone inj    Acute pain of right knee       losartan 50 MG tablet    COZAAR    90 tablet    Take 1 tablet (50 mg) by mouth daily    Essential hypertension       MULTIPLE VITAMIN PO      1 TABLET ORALLY DAILY        order for [facility]      [employer]ironics Remstar 60 series auto CPAP 5-15 cm H2O    RAÚL (obstructive sleep apnea)       triamcinolone acetonide 40 MG/ML injection    KENALOG    1 mL    1 mL (40 mg) by INTRA-ARTICULAR route once for 1 dose    Acute pain of right knee       TYLENOL PM EXTRA STRENGTH PO      Take  by mouth as needed.        vitamin B complex with vitamin C Tabs tablet      Take 1 tablet by  mouth daily        VITAMIN D PO           ZINC PO      1 TABLET ORALLY DAILY

## 2017-10-17 NOTE — LETTER
My Depression Action Plan  Name: Kwabena Richards   Date of Birth 1960  Date: 10/17/2017    My doctor: Shraddha Loyd   My clinic: [facility]  [address] [address]  [phone]          GREEN    ZONE   Good Control    What it looks like:     Things are going generally well. You have normal up s and down s. You may even feel depressed from time to time, but bad moods usually last less than a day.   What you need to do:  1. Continue to care for yourself (see self care plan)  2. Check your depression survival kit and update it as needed  3. Follow your physician s recommendations including any medication.  4. Do not stop taking medication unless you consult with your physician first.           YELLOW         ZONE Getting Worse    What it looks like:     Depression is starting to interfere with your life.     It may be hard to get out of bed; you may be starting to isolate yourself from others.    Symptoms of depression are starting to last most all day and this has happened for several days.     You may have suicidal thoughts but they are not constant.   What you need to do:     1. Call your care team, your response to treatment will improve if you keep your care team informed of your progress. Yellow periods are signs an adjustment may need to be made.     2. Continue your self-care, even if you have to fake it!    3. Talk to someone in your support network    4. Open up your depression survival kit           RED    ZONE Medical Alert - Get Help    What it looks like:     Depression is seriously interfering with your life.     You may experience these or other symptoms: You can t get out of bed most days, can t work or engage in other necessary activities, you have trouble taking care of basic hygiene, or basic responsibilities, thoughts of suicide or death that will not go away, self-injurious behavior.     What you need to do:  1. Call your care  team and request a same-day appointment. If they are not available (weekends or after hours) call your local crisis line, emergency room or 911.      Electronically signed by: Torrie Morrissey, October 17, 2017    Depression Self Care Plan / Survival Kit    Self-Care for Depression  Here s the deal. Your body and mind are really not as separate as most people think.  What you do and think affects how you feel and how you feel influences what you do and think. This means if you do things that people who feel good do, it will help you feel better.  Sometimes this is all it takes.  There is also a place for medication and therapy depending on how severe your depression is, so be sure to consult with your medical provider and/ or Behavioral Health Consultant if your symptoms are worsening or not improving.     In order to better manage my stress, I will:    Exercise  Get some form of exercise, every day. This will help reduce pain and release endorphins, the  feel good  chemicals in your brain. This is almost as good as taking antidepressants!  This is not the same as joining a gym and then never going! (they count on that by the way ) It can be as simple as just going for a walk or doing some gardening, anything that will get you moving.      Hygiene   Maintain good hygiene (Get out of bed in the morning, Make your bed, Brush your teeth, Take a shower, and Get dressed like you were going to work, even if you are unemployed).  If your clothes don't fit try to get ones that do.    Diet  I will strive to eat foods that are good for me, drink plenty of water, and avoid excessive sugar, caffeine, alcohol, and other mood-altering substances.  Some foods that are helpful in depression are: complex carbohydrates, B vitamins, flaxseed, fish or fish oil, fresh fruits and vegetables.    Psychotherapy  I agree to participate in Individual Therapy (if recommended).    Medication  If prescribed medications, I agree to take them.   Missing doses can result in serious side effects.  I understand that drinking alcohol, or other illicit drug use, may cause potential side effects.  I will not stop my medication abruptly without first discussing it with my provider.    Staying Connected With Others  I will stay in touch with my friends, family members, and my primary care provider/team.    Use your imagination  Be creative.  We all have a creative side; it doesn t matter if it s oil painting, sand castles, or mud pies! This will also kick up the endorphins.    Witness Beauty  (AKA stop and smell the roses) Take a look outside, even in mid-winter. Notice colors, textures. Watch the squirrels and birds.     Service to others  Be of service to others.  There is always someone else in need.  By helping others we can  get out of ourselves  and remember the really important things.  This also provides opportunities for practicing all the other parts of the program.    Humor  Laugh and be silly!  Adjust your TV habits for less news and crime-drama and more comedy.    Control your stress  Try breathing deep, massage therapy, biofeedback, and meditation. Find time to relax each day.     My support system    Clinic Contact:  Phone number:    Contact 1:  Phone number:    Contact 2:  Phone number:    Christian/:  Phone number:    Therapist:  Phone number:    Local crisis center:    Phone number:    Other community support:  Phone number:

## 2017-10-17 NOTE — LETTER
October 17, 2017      Kwabena Richards  [address] [place] [id number]        Dear ,    We are writing to inform you of your test results.    Kidney function and electrolytes are normal.   Blood sugars are normal.     LDL (bad cholesterol) is good.   Your triglycerides are high. Aerobic exercise will help lower this, as well as weight loss. You should follow a low fat diet, limiting refined sugars, fruit juices, and high fructose beverages; we suggest increased consumption of fish that contain high amounts of omega-3 fatty acids.   Your HDL cholesterol (good cholesterol) is low. HDL helps your body get rid of the bad cholesterol and is cardio-protective. Exercise helps increase your HDL.       If you have any questions or concerns, please call the clinic at the number listed above.       Sincerely,      Shraddha Loyd NP

## 2017-10-17 NOTE — PROCEDURES
Injection Procedure note  After obtaining consent from the patient the area of the joint was prepped in the usual fashion. Lidocaine 1% with Kenalog (40 Mg ) was injected into the joint space ( right knee )  .Area was cleansed .Band aid applied.Patient tolerated the procedure well.

## 2017-10-17 NOTE — PROGRESS NOTES
SUBJECTIVE:   Kwabena Richards is a 57 year old male who presents to clinic today for the following health issues:      Joint Pain/Rt knee pain     Onset: yesterday     Description:   Location: right knee  Character: Sharp, Dull ache, Stabbing, Burning and Cramping    Intensity: moderate, severe    Progression of Symptoms: worse    Accompanying Signs & Symptoms:  Other symptoms: swelling    History:   Previous similar pain: YES      Precipitating factors:   Trauma or overuse: YES    Alleviating factors:  Improved by: nothing    Therapies Tried and outcome:Patient is using  Ice and  ibuprofen is not helping         Patient is a 57 yr old male here for knee injection. Patient has a know diagnosis of osteoarthritis of the knee. His last injection was several years ago. He reports that he twisted the knee sometimes this week.    Problem list and histories reviewed & adjusted, as indicated.  Additional history: as documented    Patient Active Problem List   Diagnosis     Diverticulosis of large intestine     DJD (degenerative joint disease) of knee     Rhinitis medicamentosa     Mild major depression (H)     Essential hypertension     Status post left partial knee replacement     Osteoarthritis of multiple joints     Mixed hyperlipidemia     Obesity     RAÚL (obstructive sleep apnea)     Hyperlipidemia LDL goal <130     Hypertension, goal below 140/90     Non morbid obesity due to excess calories     Past Surgical History:   Procedure Laterality Date     JOINT REPLACEMTN, KNEE RT/LT  2009    Joint Replacement knee /LT     SURGICAL HISTORY OF -   early 1990's    Right knee arthroscopy for meniscus tear     SURGICAL HISTORY OF -   3/12/2004    Right rotator cuff repair     SURGICAL HISTORY OF -   9/22/2004    Cystoscopy     SURGICAL HISTORY OF -   2005    Removal of loose bodies, partial medial meniscectomy       Social History   Substance Use Topics     Smoking status: Former Smoker     Quit date: 6/23/1995     Smokeless  tobacco: Never Used     Alcohol use 2.4 oz/week     4 Standard drinks or equivalent per week      Comment: drinks once a week, only on Friday     Family History   Problem Relation Age of Onset     Hypertension Mother      Arthritis Mother      CANCER Mother      skin     CANCER Father      liver, stomach     CANCER Maternal Grandmother      Circulatory Maternal Grandfather      blood clot     CANCER Paternal Grandmother      CEREBROVASCULAR DISEASE Paternal Grandfather      Genitourinary Problems Son      blastoma         Current Outpatient Prescriptions   Medication Sig Dispense Refill     losartan (COZAAR) 50 MG tablet Take 1 tablet (50 mg) by mouth daily 90 tablet 1     DULoxetine (CYMBALTA) 60 MG EC capsule Take 1 capsule (60 mg) by mouth daily 90 capsule 0     celecoxib (CELEBREX) 100 MG capsule Take 2 capsules (200 mg) by mouth daily 180 capsule 0     latanoprost (XALATAN) 0.005 % ophthalmic solution        buPROPion (WELLBUTRIN XL) 150 MG 24 hr tablet Take 1 tablet (150 mg) by mouth every morning 30 tablet 11     vitamin  B complex with vitamin C (VITAMIN  B COMPLEX) TABS Take 1 tablet by mouth daily       Cholecalciferol (VITAMIN D PO)        GLUCOSAMINE CHONDROITIN COMPLX PO        fluticasone (FLONASE) 50 MCG/ACT nasal spray Spray 1-2 sprays into both nostrils daily 3 Package 0     Garlic 2 MG CAPS Take 1 capsule by mouth daily       Coenzyme Q10 (CO Q 10) 100 MG CAPS Take 1 capsule by mouth daily       Diphenhydramine-APAP, sleep, (TYLENOL PM EXTRA STRENGTH PO) Take  by mouth as needed.       fish oil-omega-3 fatty acids (EQL FISH OIL) 1000 MG capsule Take 3 capsules by mouth daily. 180 capsule 12     ASPIRIN 81 MG OR CHEW (ON HOLD WHILE ON COUMADIN)1 TABLET BY MOUTH DAILY       MULTIPLE VITAMIN OR 1 TABLET ORALLY DAILY       ZINC OR 1 TABLET ORALLY DAILY       amoxicillin (AMOXIL) 500 MG tablet Take 4 tablets X 1 dose prior to dental procedure. 4 tablet 0     ORDER FOR SECU4 [employer] Remstar 60 series  "auto CPAP 5-15 cm H2O       Allergies   Allergen Reactions     Dilaudid [Hydromorphone Hcl] Itching     Lisinopril      Cough           Reviewed and updated as needed this visit by clinical staffTobacco  Allergies  Med Hx  Surg Hx  Fam Hx  Soc Hx      Reviewed and updated as needed this visit by Provider         ROS:  Constitutional, HEENT, cardiovascular, pulmonary, gi and gu systems are negative, except as otherwise noted.      OBJECTIVE:   /80 (BP Location: Left arm, Cuff Size: Adult Regular)  Pulse 65  Temp 97.2  F (36.2  C) (Tympanic)  Ht 5' 4.75\" (1.645 m)  Wt 212 lb (96.2 kg)  BMI 35.55 kg/m2  Body mass index is 35.55 kg/(m^2).  GENERAL: healthy, alert and no distress  NECK: no adenopathy, no asymmetry, masses, or scars and thyroid normal to palpation  RESP: lungs clear to auscultation - no rales, rhonchi or wheezes  CV: regular rate and rhythm, normal S1 S2, no S3 or S4, no murmur, click or rub, no peripheral edema and peripheral pulses strong  MS: decreased range of motion of right knee    Diagnostic Test Results:  none     ASSESSMENT/PLAN:   1. Acute pain of right knee  Injection placed  - Kenalog 40 MG  []  - triamcinolone acetonide (KENALOG) 40 MG/ML injection; 1 mL (40 mg) by INTRA-ARTICULAR route once for 1 dose  Dispense: 1 mL; Refill: 0  - lidocaine 1 % injection; Patient was seen in clinic for cortisone inj  Dispense: 1 mL; Refill: 0  - Lidocaine 1% or 2%     []  - DRAIN/INJECT LARGE JOINT/BURSA    2. Hypertension, goal below 140/90  - Basic metabolic panel  (Ca, Cl, CO2, Creat, Gluc, K, Na, BUN)    3. Hyperlipidemia LDL goal <130  - Lipid panel reflex to direct LDL    FUTURE APPOINTMENTS:       - Follow-up visit as needed.    Lynnette Clark MD  [facility]  "

## 2017-10-17 NOTE — PATIENT INSTRUCTIONS
Thank you for choosing [facility].  You may be receiving a survey in the mail from Moira Coy regarding your visit today.  Please take a few minutes to complete and return the survey to let us know how we are doing.      If you have questions or concerns, please contact us via Filao or you can contact your care team at [phone].    Our Clinic hours are:  Monday 6:40 am  to 7:00 pm  Tuesday -Friday 6:40 am to 5:00 pm    The [place] outpatient lab hours are:  Monday - Friday 6:10 am to 4:45 pm  Saturdays 7:00 am to 11:00 am  Appointments are required, call [phone]    If you have clinical questions after hours or would like to schedule an appointment,  call the clinic at [phone].

## 2017-10-18 ASSESSMENT — ANXIETY QUESTIONNAIRES: GAD7 TOTAL SCORE: 0

## 2017-11-16 DIAGNOSIS — M17.0 PRIMARY OSTEOARTHRITIS OF BOTH KNEES: ICD-10-CM

## 2017-11-16 DIAGNOSIS — Z76.0 ENCOUNTER FOR MEDICATION REFILL: Primary | ICD-10-CM

## 2017-11-16 DIAGNOSIS — F33.0 MAJOR DEPRESSIVE DISORDER, RECURRENT EPISODE, MILD (H): ICD-10-CM

## 2017-11-20 RX ORDER — CELECOXIB 100 MG/1
CAPSULE ORAL
Qty: 180 CAPSULE | Refills: 0 | Status: SHIPPED | OUTPATIENT
Start: 2017-11-20 | End: 2018-03-15

## 2017-11-20 RX ORDER — DULOXETIN HYDROCHLORIDE 60 MG/1
CAPSULE, DELAYED RELEASE ORAL
Qty: 90 CAPSULE | Refills: 0 | Status: SHIPPED | OUTPATIENT
Start: 2017-11-20 | End: 2018-02-28

## 2017-11-20 NOTE — TELEPHONE ENCOUNTER
PHQ-9 score:    PHQ-9 SCORE 10/17/2017   Total Score -   Total Score 0           Lab Results   Component Value Date    ALT 28 08/29/2016     Lab Results   Component Value Date    WBC 7.5 01/28/2012     Lab Results   Component Value Date    RBC 4.71 01/28/2012     Lab Results   Component Value Date    HGB 14.5 01/28/2012     Lab Results   Component Value Date    HCT 44.7 01/28/2012     No components found for: MCT  Lab Results   Component Value Date    MCV 95 01/28/2012     Lab Results   Component Value Date    MCH 30.8 01/28/2012     Lab Results   Component Value Date    MCHC 32.4 01/28/2012     Lab Results   Component Value Date    RDW 13.6 01/28/2012     Lab Results   Component Value Date     01/28/2012

## 2018-01-11 ENCOUNTER — OFFICE VISIT (OUTPATIENT)
Dept: FAMILY MEDICINE | Facility: CLINIC | Age: 58
End: 2018-01-11
Payer: COMMERCIAL

## 2018-01-11 ENCOUNTER — RADIANT APPOINTMENT (OUTPATIENT)
Dept: GENERAL RADIOLOGY | Facility: CLINIC | Age: 58
End: 2018-01-11
Attending: NURSE PRACTITIONER
Payer: COMMERCIAL

## 2018-01-11 VITALS
SYSTOLIC BLOOD PRESSURE: 148 MMHG | DIASTOLIC BLOOD PRESSURE: 110 MMHG | BODY MASS INDEX: 35.53 KG/M2 | TEMPERATURE: 98.5 F | WEIGHT: 211.9 LBS | HEART RATE: 74 BPM

## 2018-01-11 DIAGNOSIS — I10 ESSENTIAL HYPERTENSION: ICD-10-CM

## 2018-01-11 DIAGNOSIS — S89.91XA KNEE INJURY, RIGHT, INITIAL ENCOUNTER: ICD-10-CM

## 2018-01-11 DIAGNOSIS — S89.91XA KNEE INJURY, RIGHT, INITIAL ENCOUNTER: Primary | ICD-10-CM

## 2018-01-11 PROCEDURE — 73560 X-RAY EXAM OF KNEE 1 OR 2: CPT | Mod: RT

## 2018-01-11 PROCEDURE — 99214 OFFICE O/P EST MOD 30 MIN: CPT | Performed by: NURSE PRACTITIONER

## 2018-01-11 ASSESSMENT — PAIN SCALES - GENERAL: PAINLEVEL: MODERATE PAIN (5)

## 2018-01-11 NOTE — PROGRESS NOTES
SUBJECTIVE:   Kwabena Richards is a 57 year old male who presents to clinic today for the following health issues:      Musculoskeletal problem/pain      Duration: 1/9/18    Description  Location: Right knee - unsure how he landed on it  Pain is throughout the knee - no one point hurts more than the others.    Intensity:  5/10 currently    Accompanying signs and symptoms: swelling    History  Previous similar problem: Has had issues with arthritis in this knee, steroid injections in the past.  Previous evaluation:  None after fall    Precipitating or alleviating factors:  Trauma or overuse: YES- Slipped and fell on Ice.  Aggravating factors include: Extension, flexion, walking, weightbearing.    Therapies tried and outcome: Has tried Advil to little effect.        Problem list and histories reviewed & adjusted, as indicated.  Additional history: as documented    Reviewed and updated as needed this visit by clinical staff     Reviewed and updated as needed this visit by Provider         ROS:  Constitutional, HEENT, cardiovascular, pulmonary, gi and gu systems are negative, except as otherwise noted.      OBJECTIVE:   BP (!) 148/110  Pulse 74  Temp 98.5  F (36.9  C) (Tympanic)  Wt 211 lb 14.4 oz (96.1 kg)  BMI 35.53 kg/m2  Body mass index is 35.53 kg/(m^2).  GENERAL: healthy, alert and no distress  MS: knee exam normal knee exam, no tenderness, effusion or instability; ligaments intact, full ROM. Minimal edematous throughout    Xray independently reviewed. Radiologist read pending.      ASSESSMENT/PLAN:       ICD-10-CM    1. Knee injury, right, initial encounter S89.91XA 2 days of right knee pain after a fall.  Xray normal.  Discussed brace, ice and over-the-counter pain relief.  Follow up if pain persists after 2 weeks, or if develops instability.      XR Knee Right 3 Views     2. Essential hypertension I10 BP high today  He will monitor at home.  Recheck with RN in one month - if still high will need to increase  the losartan.       The risks, benefits and treatment options of prescribed medications or other treatments have been discussed with the patient. The patient verbalized their understanding and should call or follow up if no improvement or if they develop further problems.    STU Maya [facility]

## 2018-01-11 NOTE — NURSING NOTE
"Initial BP (!) 148/110  Pulse 74  Temp 98.5  F (36.9  C) (Tympanic)  Wt 211 lb 14.4 oz (96.1 kg)  BMI 35.53 kg/m2 Estimated body mass index is 35.53 kg/(m^2) as calculated from the following:    Height as of 10/17/17: 5' 4.75\" (1.645 m).    Weight as of this encounter: 211 lb 14.4 oz (96.1 kg). .      "

## 2018-01-11 NOTE — MR AVS SNAPSHOT
"              After Visit Summary   2018    Kwabena Richards    MRN: [id number]           Patient Information     Date Of Birth          1960        Visit Information        Provider Department      2018 8:40 AM Shraddha Loyd APRN CNP [facility]        Today's Diagnoses     Knee injury, right, initial encounter    -  1    Essential hypertension           Follow-ups after your visit        Who to contact     If you have questions or need follow up information about today's clinic visit or your schedule please contact [facility] directly at [phone].  Normal or non-critical lab and imaging results will be communicated to you by Element[employer], letter or phone within 4 business days after the clinic has received the results. If you do not hear from us within 7 days, please contact the clinic through Element[employer] or phone. If you have a critical or abnormal lab result, we will notify you by phone as soon as possible.  Submit refill requests through [employer] or call your pharmacy and they will forward the refill request to us. Please allow 3 business days for your refill to be completed.          Additional Information About Your Visit        MyChart Information     [employer] lets you send messages to your doctor, view your test results, renew your prescriptions, schedule appointments and more. To sign up, go to [email or web address] . Click on \"Log in\" on the left side of the screen, which will take you to the Welcome page. Then click on \"Sign up Now\" on the right side of the page.     You will be asked to enter the access code listed below, as well as some personal information. Please follow the directions to create your username and password.     Your access code is: [email or web address]  Expires: 1/15/2018  7:07 AM     Your access code will  in 90 days. If you need help or a new code, please call your [facility] or [phone].        Care EveryWhere ID     This is " your Care EveryWhere ID. This could be used by other organizations to access your [place] medical records  [id number]        Your Vitals Were     Pulse Temperature BMI (Body Mass Index)             74 98.5  F (36.9  C) (Tympanic) 35.53 kg/m2          Blood Pressure from Last 3 Encounters:   01/11/18 (!) 148/110   10/17/17 135/80   02/28/17 (!) 146/96    Weight from Last 3 Encounters:   01/11/18 211 lb 14.4 oz (96.1 kg)   10/17/17 212 lb (96.2 kg)   02/28/17 207 lb 9.6 oz (94.2 kg)              Today, you had the following     No orders found for display       Primary Care Provider Office Phone # Fax #    Shraddha Collins STU Delgado [facility] [phone] [address] [facility] [address]        Equal Access to Services     LEESA GRIDER : Hadii aad ku hadasho Soomaali, waaxda luqadaha, qaybta kaalmada adeegyada, waxay sgin haybrendan rock gonzalez . So [facility] [phone].    ATENCIÓN: Si habla español, tiene a hector disposición servicios gratuitos de asistencia lingüística. Llame al [phone].    We comply with applicable federal civil rights laws and [place] laws. We do not discriminate on the basis of race, color, national origin, age, disability, sex, sexual orientation, or gender identity.            Thank you!     Thank you for choosing [facility]  for your care. Our goal is always to provide you with excellent care. Hearing back from our patients is one way we can continue to improve our services. Please take a few minutes to complete the written survey that you may receive in the mail after your visit with us. Thank you!             Your Updated Medication List - Protect others around you: Learn how to safely use, store and throw away your medicines at www.disposemymeds.org.          This list is accurate as of: 1/11/18 11:53 AM.  Always use your most recent med list.                   Brand Name Dispense Instructions for use Diagnosis    amoxicillin 500 MG tablet     AMOXIL    4 tablet    Take 4 tablets X 1 dose prior to dental procedure.    Status post total knee replacement, unspecified laterality       aspirin 81 MG chewable tablet      (ON HOLD WHILE ON COUMADIN)1 TABLET BY MOUTH DAILY        buPROPion 150 MG 24 hr tablet    WELLBUTRIN XL    30 tablet    Take 1 tablet (150 mg) by mouth every morning    Major depressive disorder, recurrent episode, mild (H)       celecoxib 100 MG capsule    celeBREX    180 capsule    TAKE TWO CAPSULES BY MOUTH EVERY DAY    Primary osteoarthritis of both knees       Co Q 10 100 MG Caps      Take 1 capsule by mouth daily    Mixed hyperlipidemia       DULoxetine 60 MG EC capsule    CYMBALTA    90 capsule    TAKE ONE CAPSULE BY MOUTH EVERY DAY    Major depressive disorder, recurrent episode, mild (H)       EQL FISH OIL 1000 MG capsule   Generic drug:  fish oil-omega-3 fatty acids     180 capsule    Take 3 capsules by mouth daily.        fluticasone 50 MCG/ACT spray    FLONASE    3 Package    Spray 1-2 sprays into both nostrils daily    Nasal congestion       Garlic 2 MG Caps      Take 1 capsule by mouth daily    HTN (hypertension)       GLUCOSAMINE CHONDROITIN COMPLX PO           latanoprost 0.005 % ophthalmic solution    XALATAN          lidocaine 1 % injection     1 mL    Patient was seen in clinic for cortisone inj    Acute pain of right knee       losartan 50 MG tablet    COZAAR    90 tablet    Take 1 tablet (50 mg) by mouth daily    Essential hypertension       MULTIPLE VITAMIN PO      1 TABLET ORALLY DAILY        order for [facility]      Respironics Remstar 60 series auto CPAP 5-15 cm H2O    RAÚL (obstructive sleep apnea)       TYLENOL PM EXTRA STRENGTH PO      Take  by mouth as needed.        vitamin B complex with vitamin C Tabs tablet      Take 1 tablet by mouth daily        VITAMIN D PO           ZINC PO      1 TABLET ORALLY DAILY

## 2018-02-05 ENCOUNTER — OFFICE VISIT (OUTPATIENT)
Dept: FAMILY MEDICINE | Facility: CLINIC | Age: 58
End: 2018-02-05
Payer: COMMERCIAL

## 2018-02-05 VITALS
BODY MASS INDEX: 34.99 KG/M2 | WEIGHT: 210 LBS | HEART RATE: 79 BPM | SYSTOLIC BLOOD PRESSURE: 159 MMHG | HEIGHT: 65 IN | DIASTOLIC BLOOD PRESSURE: 95 MMHG | TEMPERATURE: 99.8 F

## 2018-02-05 DIAGNOSIS — Z12.11 SPECIAL SCREENING FOR MALIGNANT NEOPLASMS, COLON: ICD-10-CM

## 2018-02-05 DIAGNOSIS — M10.042 ACUTE IDIOPATHIC GOUT OF LEFT HAND: Primary | ICD-10-CM

## 2018-02-05 PROCEDURE — 99213 OFFICE O/P EST LOW 20 MIN: CPT | Performed by: NURSE PRACTITIONER

## 2018-02-05 RX ORDER — PREDNISONE 20 MG/1
40 TABLET ORAL DAILY
Qty: 10 TABLET | Refills: 0 | Status: SHIPPED | OUTPATIENT
Start: 2018-02-05 | End: 2018-02-10

## 2018-02-05 NOTE — NURSING NOTE
"Chief Complaint   Patient presents with     Musculoskeletal Problem       Initial BP (!) 159/95  Pulse 79  Temp 99.8  F (37.7  C) (Oral)  Ht 5' 4.75\" (1.645 m)  Wt 210 lb (95.3 kg)  BMI 35.22 kg/m2 Estimated body mass index is 35.22 kg/(m^2) as calculated from the following:    Height as of this encounter: 5' 4.75\" (1.645 m).    Weight as of this encounter: 210 lb (95.3 kg).  Medication Reconciliation: complete  "

## 2018-02-05 NOTE — PROGRESS NOTES
"  SUBJECTIVE:   Kwabena Richards is a 57 year old male who presents to clinic today for the following health issues:      Concern - middle finger, left hand  Onset: yesteray    Description:   Patient here c/o left middle finger pain, redness and swelling at the PIP    Intensity: moderate    Progression of Symptoms:  Worsening- redness spreading this morning    Accompanying Signs & Symptoms:  Started with knuckle pain  Denies any trauma  No fever or chills.  No body aches.    Previous history of similar problem:   No  No h/o gout    Precipitating factors:   Worsened by: unknown    Alleviating factors:  Improved by: nothing    Therapies Tried and outcome: nothing        Problem list and histories reviewed & adjusted, as indicated.  Additional history: as documented      Reviewed and updated as needed this visit by clinical staff  Tobacco  Allergies  Meds       Reviewed and updated as needed this visit by Provider         ROS:  Constitutional, HEENT, cardiovascular, pulmonary, gi and gu systems are negative, except as otherwise noted.    OBJECTIVE:     BP (!) 159/95  Pulse 79  Temp 99.8  F (37.7  C) (Oral)  Ht 5' 4.75\" (1.645 m)  Wt 210 lb (95.3 kg)  BMI 35.22 kg/m2  Body mass index is 35.22 kg/(m^2).  GENERAL: healthy, alert and no distress  MS: Left middle finger - tenderness and edema at the PIP - mild surrounding erythema. No warmth.      ASSESSMENT/PLAN:       ICD-10-CM    1. Acute idiopathic gout of left hand M10.042 predniSONE (DELTASONE) 20 MG tablet   2. Special screening for malignant neoplasms, colon Z12.11 GASTROENTEROLOGY ADULT REF PROCEDURE ONLY       The risks, benefits and treatment options of prescribed medications or other treatments have been discussed with the patient. The patient verbalized their understanding and should call or follow up if no improvement or if they develop further problems.    STU Maya [facility]  "

## 2018-02-05 NOTE — MR AVS SNAPSHOT
After Visit Summary   2/5/2018    Kwabena Richards    MRN: [id number]           Patient Information     Date Of Birth          1960        Visit Information        Provider Department      2/5/2018 10:40 AM Shraddha Loyd APRN CNP [facility]        Today's Diagnoses     Acute idiopathic gout of left hand    -  1    Special screening for malignant neoplasms, colon           Follow-ups after your visit        Additional Services     GASTROENTEROLOGY ADULT REF PROCEDURE ONLY       Last Lab Result: Creatinine (mg/dL)       Date                     Value                 10/17/2017               0.92             ----------  Body mass index is 35.22 kg/(m^2).     Needed:  No  Language:  English    Patient will be contacted to schedule procedure.     Please be aware that coverage of these services is subject to the terms and limitations of your health insurance plan.  Call member services at your health plan with any benefit or coverage questions.  Any procedures must be performed at a [place] facility OR coordinated by your clinic's referral office.    Please bring the following with you to your appointment:    (1) Any X-Rays, CTs or MRIs which have been performed.  Contact the facility where they were done to arrange for  prior to your scheduled appointment.    (2) List of current medications   (3) This referral request   (4) Any documents/labs given to you for this referral                  Who to contact     If you have questions or need follow up information about today's clinic visit or your schedule please contact [facility] directly at [phone].  Normal or non-critical lab and imaging results will be communicated to you by MyChart, letter or phone within 4 business days after the clinic has received the results. If you do not hear from us within 7 days, please contact the clinic through MyChart or phone. If you have a critical or  "abnormal lab result, we will notify you by phone as soon as possible.  Submit refill requests through [employer] or call your pharmacy and they will forward the refill request to us. Please allow 3 business days for your refill to be completed.          Additional Information About Your Visit        [employer] Information     [employer] lets you send messages to your doctor, view your test results, renew your prescriptions, schedule appointments and more. To sign up, go to [email or web address] . Click on \"Log in\" on the left side of the screen, which will take you to the Welcome page. Then click on \"Sign up Now\" on the right side of the page.     You will be asked to enter the access code listed below, as well as some personal information. Please follow the directions to create your username and password.     Your access code is: [email or web address]  Expires: 2018 12:20 PM     Your access code will  in 90 days. If you need help or a new code, please call your [place] clinic or [phone].        Care EveryWhere ID     This is your Care EveryWhere ID. This could be used by other organizations to access your [place] medical records  [id number]        Your Vitals Were     Pulse Temperature Height BMI (Body Mass Index)          79 99.8  F (37.7  C) (Oral) 5' 4.75\" (1.645 m) 35.22 kg/m2         Blood Pressure from Last 3 Encounters:   18 (!) 159/95   18 (!) 148/110   10/17/17 135/80    Weight from Last 3 Encounters:   18 210 lb (95.3 kg)   18 211 lb 14.4 oz (96.1 kg)   10/17/17 212 lb (96.2 kg)              We Performed the Following     GASTROENTEROLOGY ADULT REF PROCEDURE ONLY          Today's Medication Changes          These changes are accurate as of 18 12:20 PM.  If you have any questions, ask your nurse or doctor.               Start taking these medicines.        Dose/Directions    predniSONE 20 MG tablet   Commonly known as:  DELTASONE   Used for:  Acute idiopathic gout of left " hand   Started by:  Shraddha Loyd APRN CNP        Dose:  40 mg   Take 2 tablets (40 mg) by mouth daily for 5 days   Quantity:  10 tablet   Refills:  0            Where to get your medicines      These medications were sent to [place] [address] [place] [facility] [place] [address] [facility], [facility] [address]     Phone:  [phone]     predniSONE 20 MG tablet                Primary Care Provider Office Phone # Fax #    STU Younger [phone] [address] [facility] [address]        Equal Access to Services     [facility] : Hadii aad ku hadasho Soomaali, waaxda luqadaha, qaybta kaalmada adeegyada, waxquique woodard hayesha gonzalez . So [facility] [phone].    ATENCIÓN: Si habla español, tiene a hector disposición servicios gratuitos de asistencia lingüística. Llame al [phone].    We comply with applicable federal civil rights laws and [place] laws. We do not discriminate on the basis of race, color, national origin, age, disability, sex, sexual orientation, or gender identity.            Thank you!     Thank you for choosing [facility]  for your care. Our goal is always to provide you with excellent care. Hearing back from our patients is one way we can continue to improve our services. Please take a few minutes to complete the written survey that you may receive in the mail after your visit with us. Thank you!             Your Updated Medication List - Protect others around you: Learn how to safely use, store and throw away your medicines at www.disposemymeds.org.          This list is accurate as of 2/5/18 12:20 PM.  Always use your most recent med list.                   Brand Name Dispense Instructions for use Diagnosis    amoxicillin 500 MG tablet    AMOXIL    4 tablet    Take 4 tablets X 1 dose prior to dental procedure.    Status post total knee replacement, unspecified laterality       aspirin 81 MG  chewable tablet      (ON HOLD WHILE ON COUMADIN)1 TABLET BY MOUTH DAILY        buPROPion 150 MG 24 hr tablet    WELLBUTRIN XL    30 tablet    Take 1 tablet (150 mg) by mouth every morning    Major depressive disorder, recurrent episode, mild (H)       celecoxib 100 MG capsule    celeBREX    180 capsule    TAKE TWO CAPSULES BY MOUTH EVERY DAY    Primary osteoarthritis of both knees       Co Q 10 100 MG Caps      Take 1 capsule by mouth daily    Mixed hyperlipidemia       DULoxetine 60 MG EC capsule    CYMBALTA    90 capsule    TAKE ONE CAPSULE BY MOUTH EVERY DAY    Major depressive disorder, recurrent episode, mild (H)       EQL FISH OIL 1000 MG capsule   Generic drug:  fish oil-omega-3 fatty acids     180 capsule    Take 3 capsules by mouth daily.        fluticasone 50 MCG/ACT spray    FLONASE    3 Package    Spray 1-2 sprays into both nostrils daily    Nasal congestion       Garlic 2 MG Caps      Take 1 capsule by mouth daily    HTN (hypertension)       GLUCOSAMINE CHONDROITIN COMPLX PO           latanoprost 0.005 % ophthalmic solution    XALATAN          lidocaine 1 % injection     1 mL    Patient was seen in clinic for cortisone inj    Acute pain of right knee       losartan 50 MG tablet    COZAAR    90 tablet    Take 1 tablet (50 mg) by mouth daily    Essential hypertension       MULTIPLE VITAMIN PO      1 TABLET ORALLY DAILY        order for [facility]      Respironics Remstar 60 series auto CPAP 5-15 cm H2O    RAÚL (obstructive sleep apnea)       predniSONE 20 MG tablet    DELTASONE    10 tablet    Take 2 tablets (40 mg) by mouth daily for 5 days    Acute idiopathic gout of left hand       TYLENOL PM EXTRA STRENGTH PO      Take  by mouth as needed.        vitamin B complex with vitamin C Tabs tablet      Take 1 tablet by mouth daily        VITAMIN D PO           ZINC PO      1 TABLET ORALLY DAILY

## 2018-02-07 DIAGNOSIS — F33.0 MAJOR DEPRESSIVE DISORDER, RECURRENT EPISODE, MILD (H): ICD-10-CM

## 2018-02-07 NOTE — TELEPHONE ENCOUNTER
"Requested Prescriptions   Pending Prescriptions Disp Refills     buPROPion (WELLBUTRIN XL) 150 MG 24 hr tablet  Last Written Prescription Date:  02/16/2017  Last Fill Quantity: 30,  # refills: 11   Last Office Visit with [facility] provider:  02/05/2018   Future Office Visit:      30 tablet 11     Sig: Take 1 tablet (150 mg) by mouth every morning    SSRIs Protocol Passed    2/7/2018 11:19 AM       Passed - PHQ-9 score less than 5 in past 6 months    The PHQ-9 criteria is meant to fail. It requires a PHQ-9 score review         Passed - Medication is Bupropion    If the medication is Bupropion (Wellbutrin), and the patient is taking for smoking cessation; OK to refill.         Passed - Patient is age 18 or older       Passed - Recent (6 mo) or future visit with authorizing provider's specialty    Patient had office visit in the last 6 months or has a visit in the next 30 days with authorizing provider.  See \"Patient Info\" tab in inbasket, or \"Choose Columns\" in Meds & Orders section of the refill encounter.              "

## 2018-02-08 RX ORDER — BUPROPION HYDROCHLORIDE 150 MG/1
150 TABLET ORAL EVERY MORNING
Qty: 90 TABLET | Refills: 1 | Status: SHIPPED | OUTPATIENT
Start: 2018-02-08 | End: 2018-03-27

## 2018-02-21 ENCOUNTER — TELEPHONE (OUTPATIENT)
Dept: FAMILY MEDICINE | Facility: CLINIC | Age: 58
End: 2018-02-21

## 2018-02-21 NOTE — TELEPHONE ENCOUNTER
BPs have been high in clinic.  Please ask him to follow up with me to recheck and discuss medication adjustment.  Shraddha Loyd, CNP

## 2018-02-21 NOTE — TELEPHONE ENCOUNTER
Panel Management Review      Patient has the following on his problem list:     Depression / Dysthymia review    Measure:  Needs PHQ-9 score of 4 or less during index window.  Administer PHQ-9 and if score is 5 or more, send encounter to provider for next steps.        PHQ-9 SCORE 2/27/2017 3/29/2017 10/17/2017   Total Score - - -   Total Score 1 1 0       If PHQ-9 recheck is 5 or more, route to provider for next steps.    Patient is due for:  DAP    Hypertension   Last three blood pressure readings:  BP Readings from Last 3 Encounters:   02/05/18 (!) 159/95   01/11/18 (!) 148/110   10/17/17 135/80     Blood pressure: FAILED    HTN Guidelines:  Age 18-59 BP range:  Less than 140/90  Age 60-85 with Diabetes:  Less than 140/90  Age 60-85 without Diabetes:  less than 150/90      Composite cancer screening  Chart review shows that this patient is due/due soon for the following Colonoscopy  Summary:    Patient is due/failing the following:   BP CHECK and COLONOSCOPY    Action needed:   Patient needs office visit for Blood Pressure Discussion, colonoscopy due- this was ordered at his last apt.2/5/18  He was given info. .    Type of outreach:    Phone, spoke to patient.  he was asked to schedule. He stated he will need to call back .     Questions for provider review:    None                                                                                                                                    Florin CHAIREZ CMA

## 2018-03-15 DIAGNOSIS — M17.0 PRIMARY OSTEOARTHRITIS OF BOTH KNEES: ICD-10-CM

## 2018-03-15 NOTE — TELEPHONE ENCOUNTER
"Requested Prescriptions   Pending Prescriptions Disp Refills     celecoxib (CELEBREX) 100 MG capsule  Last Written Prescription Date:  11/20/17  Last Fill Quantity: 180,  # refills: 0   Last office visit: 2/5/2018 with prescribing provider:  02/05/18   Future Office Visit:     180 capsule 0    NSAID Medications Failed    3/15/2018  7:30 AM       Failed - Blood pressure under 140/90 in past 12 months    BP Readings from Last 3 Encounters:   02/05/18 (!) 159/95   01/11/18 (!) 148/110   10/17/17 135/80          Failed - Normal ALT on file in past 12 months    Recent Labs   Lab Test  08/29/16   1639   ALT  28          Failed - Normal AST on file in past 12 months    Recent Labs   Lab Test  08/29/16   1639   AST  18          Failed - Normal CBC on file in past 12 months    Recent Labs   Lab Test  01/28/12   0929   WBC  7.5   RBC  4.71   HGB  14.5   HCT  44.7   PLT  262          Passed - Recent (12 mo) or future (30 days) visit within the authorizing provider's specialty    Patient had office visit in the last 12 months or has a visit in the next 30 days with authorizing provider or within the authorizing provider's specialty.  See \"Patient Info\" tab in inbasket, or \"Choose Columns\" in Meds & Orders section of the refill encounter.           Passed - Patient is age 6-64 years       Passed - Normal serum creatinine on file in past 12 months    Recent Labs   Lab Test  10/17/17   0757   CR  0.92           "

## 2018-03-16 RX ORDER — CELECOXIB 100 MG/1
CAPSULE ORAL
Qty: 180 CAPSULE | Refills: 0 | Status: SHIPPED | OUTPATIENT
Start: 2018-03-16 | End: 2018-03-27

## 2018-03-27 ENCOUNTER — OFFICE VISIT (OUTPATIENT)
Dept: FAMILY MEDICINE | Facility: CLINIC | Age: 58
End: 2018-03-27
Payer: COMMERCIAL

## 2018-03-27 VITALS
HEART RATE: 66 BPM | TEMPERATURE: 97.9 F | DIASTOLIC BLOOD PRESSURE: 99 MMHG | WEIGHT: 212 LBS | OXYGEN SATURATION: 99 % | SYSTOLIC BLOOD PRESSURE: 158 MMHG | BODY MASS INDEX: 35.32 KG/M2 | HEIGHT: 65 IN

## 2018-03-27 DIAGNOSIS — M17.0 PRIMARY OSTEOARTHRITIS OF BOTH KNEES: ICD-10-CM

## 2018-03-27 DIAGNOSIS — I10 ESSENTIAL HYPERTENSION: ICD-10-CM

## 2018-03-27 DIAGNOSIS — F33.0 MAJOR DEPRESSIVE DISORDER, RECURRENT EPISODE, MILD (H): Primary | ICD-10-CM

## 2018-03-27 PROCEDURE — 99214 OFFICE O/P EST MOD 30 MIN: CPT | Performed by: NURSE PRACTITIONER

## 2018-03-27 RX ORDER — LOSARTAN POTASSIUM 100 MG/1
100 TABLET ORAL DAILY
Qty: 90 TABLET | Refills: 3 | Status: SHIPPED | OUTPATIENT
Start: 2018-03-27 | End: 2019-03-13

## 2018-03-27 RX ORDER — BUPROPION HYDROCHLORIDE 150 MG/1
150 TABLET ORAL EVERY MORNING
Qty: 90 TABLET | Refills: 3 | Status: SHIPPED | OUTPATIENT
Start: 2018-03-27 | End: 2019-04-10

## 2018-03-27 RX ORDER — CELECOXIB 100 MG/1
CAPSULE ORAL
Qty: 180 CAPSULE | Refills: 3 | Status: SHIPPED | OUTPATIENT
Start: 2018-03-27 | End: 2019-06-01

## 2018-03-27 RX ORDER — DULOXETIN HYDROCHLORIDE 60 MG/1
60 CAPSULE, DELAYED RELEASE ORAL DAILY
Qty: 90 CAPSULE | Refills: 3 | Status: SHIPPED | OUTPATIENT
Start: 2018-03-27 | End: 2019-04-15

## 2018-03-27 RX ORDER — LOSARTAN POTASSIUM 50 MG/1
50 TABLET ORAL DAILY
Qty: 30 TABLET | Refills: 0 | Status: CANCELLED | OUTPATIENT
Start: 2018-03-27

## 2018-03-27 ASSESSMENT — ANXIETY QUESTIONNAIRES
6. BECOMING EASILY ANNOYED OR IRRITABLE: NOT AT ALL
1. FEELING NERVOUS, ANXIOUS, OR ON EDGE: NOT AT ALL
5. BEING SO RESTLESS THAT IT IS HARD TO SIT STILL: NOT AT ALL
GAD7 TOTAL SCORE: 0
7. FEELING AFRAID AS IF SOMETHING AWFUL MIGHT HAPPEN: NOT AT ALL
2. NOT BEING ABLE TO STOP OR CONTROL WORRYING: NOT AT ALL
IF YOU CHECKED OFF ANY PROBLEMS ON THIS QUESTIONNAIRE, HOW DIFFICULT HAVE THESE PROBLEMS MADE IT FOR YOU TO DO YOUR WORK, TAKE CARE OF THINGS AT HOME, OR GET ALONG WITH OTHER PEOPLE: NOT DIFFICULT AT ALL
3. WORRYING TOO MUCH ABOUT DIFFERENT THINGS: NOT AT ALL

## 2018-03-27 ASSESSMENT — PATIENT HEALTH QUESTIONNAIRE - PHQ9: 5. POOR APPETITE OR OVEREATING: NOT AT ALL

## 2018-03-27 NOTE — NURSING NOTE
"Chief Complaint   Patient presents with     Hypertension     Refill Request     Medications pending       Initial BP (!) 158/99  Pulse 66  Temp 97.9  F (36.6  C) (Tympanic)  Ht 5' 4.75\" (1.645 m)  Wt 212 lb (96.2 kg)  SpO2 99%  BMI 35.55 kg/m2 Estimated body mass index is 35.55 kg/(m^2) as calculated from the following:    Height as of this encounter: 5' 4.75\" (1.645 m).    Weight as of this encounter: 212 lb (96.2 kg).  Medication Reconciliation: complete    "

## 2018-03-27 NOTE — PATIENT INSTRUCTIONS
Increase losartan to 100 mg daily.  Return for an RN blood pressure check in one month.        Thank you for choosing [facility].  You may be receiving a survey in the mail from Moira Coy regarding your visit today.  Please take a few minutes to complete and return the survey to let us know how we are doing.      If you have questions or concerns, please contact us via [employer] or you can contact your care team at [phone].    Our Clinic hours are:  Monday 6:40 am  to 7:00 pm  Tuesday -Friday 6:40 am to 5:00 pm    The [place] outpatient lab hours are:  Monday - Friday 6:10 am to 4:45 pm  Saturdays 7:00 am to 11:00 am  Appointments are required, call [phone]    If you have clinical questions after hours or would like to schedule an appointment,  call the clinic at [phone].

## 2018-03-27 NOTE — PROGRESS NOTES
"  SUBJECTIVE:   Kwabena Richards is a 57 year old male who presents to clinic today for the following health issues:      Hyperlipidemia Follow-Up      Rate your low fat/cholesterol diet?: poor    Taking statin?  No    Other lipid medications/supplements?:  none    Hypertension Follow-up      Outpatient blood pressures are not being checked.    Low Salt Diet: low salt      Amount of exercise or physical activity: None    Problems taking medications regularly: No    Medication side effects: none    Diet: regular (no restrictions) Limiting carbs        Depression Followup    Status since last visit: Stable - well controlled    See PHQ-9 for current symptoms.  Other associated symptoms: None    Complicating factors:   Significant life event:  No   Current substance abuse:  None  Anxiety or Panic symptoms:  No    PHQ-9 2/27/2017 3/29/2017 10/17/2017   Total Score 1 1 0   Q9: Suicide Ideation Not at all Not at all Not at all         Problem list and histories reviewed & adjusted, as indicated.  Additional history: as documented      Reviewed and updated as needed this visit by clinical staff       Reviewed and updated as needed this visit by Provider         ROS:  Constitutional, HEENT, cardiovascular, pulmonary, gi and gu systems are negative, except as otherwise noted.    OBJECTIVE:     BP (!) 158/99  Pulse 66  Temp 97.9  F (36.6  C) (Tympanic)  Ht 5' 4.75\" (1.645 m)  Wt 212 lb (96.2 kg)  SpO2 99%  BMI 35.55 kg/m2  Body mass index is 35.55 kg/(m^2).  GENERAL: healthy, alert and no distress  NECK: no adenopathy, no asymmetry, masses, or scars and thyroid normal to palpation  RESP: lungs clear to auscultation - no rales, rhonchi or wheezes  CV: regular rate and rhythm, normal S1 S2, no S3 or S4, no murmur, click or rub, no peripheral edema and peripheral pulses strong  ABDOMEN: soft, nontender, no hepatosplenomegaly, no masses and bowel sounds normal  MS: no gross musculoskeletal defects noted, no " edema      ASSESSMENT/PLAN:       ICD-10-CM    1. Major depressive disorder, recurrent episode, mild (H) F33.0 Well controlled.  Continue current medications.  DULoxetine (CYMBALTA) 60 MG EC capsule     buPROPion (WELLBUTRIN XL) 150 MG 24 hr tablet     2. Essential hypertension I10 Poorly controlled.  Increase losartan (COZAAR) to 100 MG tablet daily  RN recheck in one month     3. Primary osteoarthritis of both knees M17.0 celecoxib (CELEBREX) 100 MG capsule       Patient Instructions   Increase losartan to 100 mg daily.  Return for an RN blood pressure check in one month.      The risks, benefits and treatment options of prescribed medications or other treatments have been discussed with the patient. The patient verbalized their understanding and should call or follow up if no improvement or if they develop further problems.    [facility]

## 2018-03-27 NOTE — MR AVS SNAPSHOT
After Visit Summary   3/27/2018    Kwabena Richards    MRN: [id number]           Patient Information     Date Of Birth          1960        Visit Information        Provider Department      3/27/2018 8:00 AM Shraddha Loyd APRN CNP [facility]        Today's Diagnoses     Major depressive disorder, recurrent episode, mild (H)    -  1    Essential hypertension        Primary osteoarthritis of both knees          Care Instructions    Increase losartan to 100 mg daily.  Return for an RN blood pressure check in one month.        Thank you for choosing [facility].  You may be receiving a survey in the mail from Moira Coy regarding your visit today.  Please take a few minutes to complete and return the survey to let us know how we are doing.      If you have questions or concerns, please contact us via [employer] or you can contact your care team at [phone].    Our Clinic hours are:  Monday 6:40 am  to 7:00 pm  Tuesday -Friday 6:40 am to 5:00 pm    The [place] outpatient lab hours are:  Monday - Friday 6:10 am to 4:45 pm  Saturdays 7:00 am to 11:00 am  Appointments are required, call [phone]    If you have clinical questions after hours or would like to schedule an appointment,  call the clinic at [phone].            Follow-ups after your visit        Who to contact     If you have questions or need follow up information about today's clinic visit or your schedule please contact [facility] directly at [phone].  Normal or non-critical lab and imaging results will be communicated to you by [employer], letter or phone within 4 business days after the clinic has received the results. If you do not hear from us within 7 days, please contact the clinic through [employer] or phone. If you have a critical or abnormal lab result, we will notify you by phone as soon as possible.  Submit refill requests through [employer] or call your pharmacy and they will  "forward the refill request to us. Please allow 3 business days for your refill to be completed.          Additional Information About Your Visit        [employer] Information     [employer] lets you send messages to your doctor, view your test results, renew your prescriptions, schedule appointments and more. To sign up, go to [email or web address] . Click on \"Log in\" on the left side of the screen, which will take you to the Welcome page. Then click on \"Sign up Now\" on the right side of the page.     You will be asked to enter the access code listed below, as well as some personal information. Please follow the directions to create your username and password.     Your access code is: [email or web address]  Expires: 2018  1:20 PM     Your access code will  in 90 days. If you need help or a new code, please call your [place] clinic or [phone].        Care EveryWhere ID     This is your Care EveryWhere ID. This could be used by other organizations to access your [place] medical records  [id number]        Your Vitals Were     Pulse Temperature Height Pulse Oximetry BMI (Body Mass Index)       66 97.9  F (36.6  C) (Tympanic) 5' 4.75\" (1.645 m) 99% 35.55 kg/m2        Blood Pressure from Last 3 Encounters:   18 (!) 158/99   18 (!) 159/95   18 (!) 148/110    Weight from Last 3 Encounters:   18 212 lb (96.2 kg)   18 210 lb (95.3 kg)   18 211 lb 14.4 oz (96.1 kg)              Today, you had the following     No orders found for display         Today's Medication Changes          These changes are accurate as of 3/27/18  8:23 AM.  If you have any questions, ask your nurse or doctor.               These medicines have changed or have updated prescriptions.        Dose/Directions    losartan 100 MG tablet   Commonly known as:  COZAAR   This may have changed:    - medication strength  - how much to take   Used for:  Essential hypertension        Dose:  100 mg   Take 1 tablet (100 mg) by " mouth daily   Quantity:  90 tablet   Refills:  3            Where to get your medicines      These medications were sent to [place] [id number] - [address]  [address] [place] [address], [facility] [address]     Phone:  [phone]     buPROPion 150 MG 24 hr tablet    celecoxib 100 MG capsule    DULoxetine 60 MG EC capsule    losartan 100 MG tablet                Primary Care Provider Office Phone # Fax #    Shraddha Loyd, APRN [facility] [phone] [address] [facility] [address]        Equal Access to Services     [facility] Hospital[facility] : Hadii aad ku hadasho Soomaali, waaxda luqadaha, qaybta kaalmada adeegyada, waxquique woodard hayesha gonzalez . So [facility] [phone].    ATENCIÓN: Si habla español, tiene a hector disposición servicios gratuitos de asistencia lingüística. iRcky al [phone].    We comply with applicable federal civil rights laws and [place] laws. We do not discriminate on the basis of race, color, national origin, age, disability, sex, sexual orientation, or gender identity.            Thank you!     Thank you for choosing [facility]  for your care. Our goal is always to provide you with excellent care. Hearing back from our patients is one way we can continue to improve our services. Please take a few minutes to complete the written survey that you may receive in the mail after your visit with us. Thank you!             Your Updated Medication List - Protect others around you: Learn how to safely use, store and throw away your medicines at www.disposemymeds.org.          This list is accurate as of 3/27/18  8:23 AM.  Always use your most recent med list.                   Brand Name Dispense Instructions for use Diagnosis    aspirin 81 MG chewable tablet      (ON HOLD WHILE ON COUMADIN)1 TABLET BY MOUTH DAILY        buPROPion 150 MG 24 hr tablet    WELLBUTRIN XL    90 tablet    Take 1 tablet (150 mg) by mouth every morning    Major  depressive disorder, recurrent episode, mild (H)       celecoxib 100 MG capsule    celeBREX    180 capsule    TAKE TWO CAPSULES BY MOUTH EVERY DAY    Primary osteoarthritis of both knees       Co Q 10 100 MG Caps      Take 1 capsule by mouth daily    Mixed hyperlipidemia       DULoxetine 60 MG EC capsule    CYMBALTA    90 capsule    Take 1 capsule (60 mg) by mouth daily    Major depressive disorder, recurrent episode, mild (H)       EQL FISH OIL 1000 MG capsule   Generic drug:  fish oil-omega-3 fatty acids     180 capsule    Take 3 capsules by mouth daily.        fluticasone 50 MCG/ACT spray    FLONASE    3 Package    Spray 1-2 sprays into both nostrils daily    Nasal congestion       Garlic 2 MG Caps      Take 1 capsule by mouth daily    HTN (hypertension)       GLUCOSAMINE CHONDROITIN COMPLX PO           latanoprost 0.005 % ophthalmic solution    XALATAN          losartan 100 MG tablet    COZAAR    90 tablet    Take 1 tablet (100 mg) by mouth daily    Essential hypertension       MULTIPLE VITAMIN PO      1 TABLET ORALLY DAILY        order for DME      Respironics Remstar 60 series auto CPAP 5-15 cm H2O    RAÚL (obstructive sleep apnea)       TYLENOL PM EXTRA STRENGTH PO      Take  by mouth as needed.        vitamin B complex with vitamin C Tabs tablet      Take 1 tablet by mouth daily        VITAMIN D PO           ZINC PO      1 TABLET ORALLY DAILY

## 2018-03-28 ASSESSMENT — PATIENT HEALTH QUESTIONNAIRE - PHQ9: SUM OF ALL RESPONSES TO PHQ QUESTIONS 1-9: 1

## 2018-03-28 ASSESSMENT — ANXIETY QUESTIONNAIRES: GAD7 TOTAL SCORE: 0

## 2018-04-09 DIAGNOSIS — G47.33 OSA (OBSTRUCTIVE SLEEP APNEA): Primary | ICD-10-CM

## 2018-04-11 ENCOUNTER — TELEPHONE (OUTPATIENT)
Dept: FAMILY MEDICINE | Facility: CLINIC | Age: 58
End: 2018-04-11

## 2018-04-11 NOTE — TELEPHONE ENCOUNTER
BP medication increased last month.  Please remind him to return for an RN recheck.  Shraddha Loyd, CNP

## 2018-04-11 NOTE — TELEPHONE ENCOUNTER
Panel Management Review      Patient has the following on his problem list:     Depression / Dysthymia review    Measure:  Needs PHQ-9 score of 4 or less during index window.  Administer PHQ-9 and if score is 5 or more, send encounter to provider for next steps.      PHQ-9 SCORE 3/29/2017 10/17/2017 3/27/2018   Total Score - - -   Total Score 1 0 1       If PHQ-9 recheck is 5 or more, route to provider for next steps.    Patient is due for:  None    Hypertension   Last three blood pressure readings:  BP Readings from Last 3 Encounters:   03/27/18 (!) 158/99   02/05/18 (!) 159/95   01/11/18 (!) 148/110     Blood pressure: FAILED    HTN Guidelines:  Age 18-59 BP range:  Less than 140/90  Age 60-85 with Diabetes:  Less than 140/90  Age 60-85 without Diabetes:  less than 150/90      Composite cancer screening  Chart review shows that this patient is due/due soon for the following Colonoscopy  Summary:    Patient is due/failing the following:   BP CHECK and COLONOSCOPY    Action needed:   Patient needs nurse only appointment.    Type of outreach:    Phone, spoke to patient.  he was advised, he had planned to call and schedule towards the end of the month ( was last seen 3/27/18) . He was also reminded to schedule colonoscopy. He has info.     Questions for provider review:    None                                                                                                                                    Florin CHAIREZ CMA

## 2018-08-27 ENCOUNTER — OFFICE VISIT (OUTPATIENT)
Dept: FAMILY MEDICINE | Facility: CLINIC | Age: 58
End: 2018-08-27
Payer: COMMERCIAL

## 2018-08-27 ENCOUNTER — TELEPHONE (OUTPATIENT)
Dept: FAMILY MEDICINE | Facility: CLINIC | Age: 58
End: 2018-08-27

## 2018-08-27 VITALS
DIASTOLIC BLOOD PRESSURE: 70 MMHG | HEIGHT: 65 IN | HEART RATE: 76 BPM | BODY MASS INDEX: 35.32 KG/M2 | WEIGHT: 212 LBS | SYSTOLIC BLOOD PRESSURE: 100 MMHG

## 2018-08-27 DIAGNOSIS — M79.644 THUMB PAIN, RIGHT: Primary | ICD-10-CM

## 2018-08-27 DIAGNOSIS — M25.50 MULTIPLE JOINT PAIN: ICD-10-CM

## 2018-08-27 PROCEDURE — 99213 OFFICE O/P EST LOW 20 MIN: CPT | Performed by: FAMILY MEDICINE

## 2018-08-27 NOTE — TELEPHONE ENCOUNTER
Reason for Call:  Other FYI    Detailed comments: Patient's blood pressure today was 100/70.  FYI for Shraddha.    Phone Number Patient can be reached at: Cell number on file:    Telephone Information:   Mobile [phone]       Best Time: any    Can we leave a detailed message on this number? YES    Call taken on 8/27/2018 at 2:38 PM by Laurence Moon

## 2018-08-27 NOTE — PROGRESS NOTES
"  SUBJECTIVE:   Kwabena Richards is a 58 year old male who presents to clinic today for the following health issues:      Hand Pain    Onset: Years, worsening with time    Description:   Location: Right thumb  Character: Dull ache that becomes more sharp when using computers more    Intensity: moderate    Progression of Symptoms: worse    Accompanying Signs & Symptoms:  Other symptoms: numbness when using computers. Pain does radiate to wrist and forearm     History:   Previous similar pain: YES      Precipitating factors:   Trauma or overuse: no     Alleviating factors:  Improved by: Steroid injection 10+ years ago with improvement    Therapies Tried and outcome: Icy Hot and Rx aspercream    - Right nipple pain since this morning. There are no scrapes, drainage redness or swelling. He is using antibiotic and a bandage over area.      Problem list and histories reviewed & adjusted, as indicated.  Additional history: as documented        Reviewed and updated as needed this visit by clinical staff       Reviewed and updated as needed this visit by Provider             OBJECTIVE:     /70  Pulse 76  Ht 5' 4.75\" (1.645 m)  Wt 212 lb (96.2 kg)  BMI 35.55 kg/m2  Body mass index is 35.55 kg/(m^2).  GENERAL: Healthy, alert and no distress  RESP: Lungs clear to auscultation - no rales, rhonchi or wheezes  CV: Regular rate and rhythm, normal S1 S2, no murmur  MS: No gross musculoskeletal defects noted, no edema  NEURO: Normal strength and tone, mentation intact and speech normal  PSYCH: Mentation appears normal, affect normal/bright         ASSESSMENT/PLAN:     (M90.564) Thumb pain, right  (primary encounter diagnosis)  Plan: SPORTS MEDICINE REFERRAL      (M25.50) Multiple joint pain  Plan: RHEUMATOLOGY REFERRAL        Patient Instructions   *    See Sports medication about an injection. Call [phone].    *    Think about seeing a rheumatologist. See referral.            Arleen Suarez MD  [facility] " [facility]

## 2018-08-27 NOTE — PATIENT INSTRUCTIONS
*    See Sports medication about an injection. Call [phone].    *    Think about seeing a rheumatologist. See referral.

## 2018-08-27 NOTE — TELEPHONE ENCOUNTER
Patient is calling the clinic to update provider on his BP  Today 100/70 at [facility] when he was at clinic visit for injection in his hand  Patient is changing his diet, lost about 9 pounds  Patient does not take BP at home    Routing to provider for MANDY CHAIREZ Rn

## 2018-08-27 NOTE — MR AVS SNAPSHOT
After Visit Summary   8/27/2018    Kwabena Richards    MRN: [id number]           Patient Information     Date Of Birth          1960        Visit Information        Provider Department      8/27/2018 2:20 PM Arleen Suarez MD [facility]        Today's Diagnoses     Thumb pain, right    -  1    Multiple joint pain          Care Instructions    *    See Sports medication about an injection. Call [phone].    *    Think about seeing a rheumatologist. See referral.           Follow-ups after your visit        Additional Services     RHEUMATOLOGY REFERRAL       Your provider has referred you to: [facility]: [facility] [phone]   [email or web address]    Please be aware that coverage of these services is subject to the terms and limitations of your health insurance plan.  Call member services at your health plan with any benefit or coverage questions.      Please bring the following with you to your appointment:    (1) Any X-Rays, CTs or MRIs which have been performed.  Contact the facility where they were done to arrange for  prior to your scheduled appointment.    (2) List of current medications   (3) This referral request   (4) Any documents/labs given to you for this referral            SPORTS MEDICINE REFERRAL       Your provider has referred you to:  [facility] [place] [facility]  [phone]   [email or web address]  [facility] [phone]   [email or web address]    Please be aware that coverage of these services is subject to the terms and limitations of your health insurance plan.  Call member services at your health plan with any benefit or coverage questions.      Please bring the following to your appointment:    Requesting  "cortisone injection, proximal right thumb.     >>   Any x-rays, CTs or MRIs which have been performed.  Contact the facility where they were done to arrange for  prior to your scheduled appointment.    >>   List of current medications   >>   This referral request   >>   Any documents/labs given to you for this referral                  Who to contact     Normal or non-critical lab and imaging results will be communicated to you by MyChart, letter or phone within 4 business days after the clinic has received the results. If you do not hear from us within 7 days, please contact the clinic through MyChart or phone. If you have a critical or abnormal lab result, we will notify you by phone as soon as possible.  Submit refill requests through [employer] or call your pharmacy and they will forward the refill request to us. Please allow 3 business days for your refill to be completed.          If you need to speak with a  for additional information , please call: [phone]           Additional Information About Your Visit        Care EveryWhere ID     This is your Care EveryWhere ID. This could be used by other organizations to access your [place] medical records  [id number]        Your Vitals Were     Pulse Height BMI (Body Mass Index)             76 5' 4.75\" (1.645 m) 35.55 kg/m2          Blood Pressure from Last 3 Encounters:   08/27/18 100/70   03/27/18 (!) 158/99   02/05/18 (!) 159/95    Weight from Last 3 Encounters:   08/27/18 212 lb (96.2 kg)   03/27/18 212 lb (96.2 kg)   02/05/18 210 lb (95.3 kg)              We Performed the Following     RHEUMATOLOGY REFERRAL     SPORTS MEDICINE REFERRAL        Primary Care Provider Office Phone # Fax #    Shraddha STU Porras [phone] [address] [facility] [address]        Equal Access to Services     LEESA GRIDER : Cipriano Sharp, wamalcomda luqadaha, qaybta kaalmada adeegyada, luis woodard " da rudyconner cherrykatie lajoekayy ah. So [facility] [phone].    ATENCIÓN: Si habla angelito, tiene a hector disposición servicios gratuitos de asistencia lingüística. Ricky gaitan [phone].    We comply with applicable federal civil rights laws and [place] laws. We do not discriminate on the basis of race, color, national origin, age, disability, sex, sexual orientation, or gender identity.            Thank you!     Thank you for choosing [facility]  for your care. Our goal is always to provide you with excellent care. Hearing back from our patients is one way we can continue to improve our services. Please take a few minutes to complete the written survey that you may receive in the mail after your visit with us. Thank you!             Your Updated Medication List - Protect others around you: Learn how to safely use, store and throw away your medicines at www.disposemymeds.org.          This list is accurate as of 8/27/18  2:29 PM.  Always use your most recent med list.                   Brand Name Dispense Instructions for use Diagnosis    aspirin 81 MG chewable tablet      (ON HOLD WHILE ON COUMADIN)1 TABLET BY MOUTH DAILY        buPROPion 150 MG 24 hr tablet    WELLBUTRIN XL    90 tablet    Take 1 tablet (150 mg) by mouth every morning    Major depressive disorder, recurrent episode, mild (H)       celecoxib 100 MG capsule    celeBREX    180 capsule    TAKE TWO CAPSULES BY MOUTH EVERY DAY    Primary osteoarthritis of both knees       Co Q 10 100 MG Caps      Take 1 capsule by mouth daily    Mixed hyperlipidemia       DULoxetine 60 MG EC capsule    CYMBALTA    90 capsule    Take 1 capsule (60 mg) by mouth daily    Major depressive disorder, recurrent episode, mild (H)       EQL FISH OIL 1000 MG capsule   Generic drug:  fish oil-omega-3 fatty acids     180 capsule    Take 3 capsules by mouth daily.        fluticasone 50 MCG/ACT spray    FLONASE    3 Package    Spray 1-2 sprays into both nostrils daily     Nasal congestion       Garlic 2 MG Caps      Take 1 capsule by mouth daily    HTN (hypertension)       GLUCOSAMINE CHONDROITIN COMPLX PO           latanoprost 0.005 % ophthalmic solution    XALATAN          losartan 100 MG tablet    COZAAR    90 tablet    Take 1 tablet (100 mg) by mouth daily    Essential hypertension       MULTIPLE VITAMIN PO      1 TABLET ORALLY DAILY        order for DME      Respironics Remstar 60 series auto CPAP 5-15 cm H2O    RAÚL (obstructive sleep apnea)       TYLENOL PM EXTRA STRENGTH PO      Take  by mouth as needed.        vitamin B complex with vitamin C Tabs tablet      Take 1 tablet by mouth daily        VITAMIN D PO           ZINC PO      1 TABLET ORALLY DAILY

## 2018-11-21 ENCOUNTER — TELEPHONE (OUTPATIENT)
Dept: FAMILY MEDICINE | Facility: CLINIC | Age: 58
End: 2018-11-21

## 2018-11-21 NOTE — TELEPHONE ENCOUNTER
Panel Management Review          Composite cancer screening  Chart review shows that this patient is due/due soon for the following Colonoscopy  Summary:    Patient is due/failing the following:   COLONOSCOPY    Action needed:   Patient needs referral/order: colonoscopy vs. FIT    Type of outreach:    Sent letter.    Questions for provider review:    None                                                                                                                                    EVELYN HAYNES

## 2018-11-21 NOTE — LETTER
Kwabena Richards  [address] [place] [phone]        Dear Kwabena,    Shraddha Loyd, CESIA has been reviewing your chart.  It appears that there are aspects of your care that could be improved.   At this time you are due for a colonoscopy.    Colon Cancer Screening- Recommended every 5-10 years, depending on your history, in order to prevent and detect colon cancer at its earliest stages.  Colon cancer is now the second leading cause of death in the [place] for both men and women and there are over 130,000 new cases and 50,000 deaths per year from colon cancer.  Colonoscopies can prevent 90-95% of these deaths.  Problem lesions can be removed before they ever become cancer.  This test is not only looking for cancer, but also getting rid of precancerous lesions.  You are usually given some sedation which makes the test very comfortable for most people.     If you do not wish to do a colonoscopy or cannot afford to do one, at this time, there is another option.  It is called a Fit test or Fecal Immunochemical Occult Blood Test (take home stool sample kit).  It does not replace the colonoscopy for colorectal cancer screening, but it can detect hidden bleeding in the lower colon.  It does need to be repeated every year and if a positive result is obtained, you would be referred for a colonoscopy.  The FIT test is really easy to do and does not require any diet or medication restrictions and involves only one collection sample.     If you are under/uninsured, we recommend you contact the [facility]- They provide free/reduced fee screenings to eligible patients based on family size and income.    [place]- [place]'s cancer screening program;  Toll free [phone]-/  [phone]  [email or web address]    If you have completed either one of these tests or had a flexible sigmoidoscopy in the past five years at another facility, please let us know so that we can update your records.  Please call us  ([phone]) if you have questions or would like to arrange either to do a colonoscopy or obtain the necessary test kit for the Fecal Occult Test.         Shraddha Loyd NP/  aubrey

## 2018-11-26 DIAGNOSIS — Z12.11 SPECIAL SCREENING FOR MALIGNANT NEOPLASMS, COLON: Primary | ICD-10-CM

## 2019-03-04 ENCOUNTER — TELEPHONE (OUTPATIENT)
Dept: SLEEP MEDICINE | Facility: CLINIC | Age: 59
End: 2019-03-04

## 2019-03-04 NOTE — TELEPHONE ENCOUNTER
PT CALL [facility], [facility] WOULD LIKE A NEW MACHINE AND NEEDS AN APPT WITH DR. GEE. ALSO STATES IS NOT RECIEVING HIS CONNECT CALLS. CHANGED HIS BILLING/MOBILE NUMBER. INFORMED HIM TO CALL ME IF HE STILL IS NOT RECEIVING CALLS CORRECTLY. SCHEDULED AN APPT WITH DR. GEE FOR AN ANNUAL CHECK UP ON 4/25/19

## 2019-03-13 DIAGNOSIS — I10 ESSENTIAL HYPERTENSION: ICD-10-CM

## 2019-03-13 NOTE — LETTER
[facility]  [address] [facility] [address]  [phone]        March 14, 2019    Kwabena Richards                                                                                                                     [address] [facility] [address]              Dear Kwabena,    We have received a refill request from your pharmacy, however, we were only able to provide a one time fill because you are due for an Office visit and labs.     Please call [phone]  to schedule an appointment before you are due for your next refill.        Sincerely,         Shraddha Loyd's Care Team

## 2019-03-14 RX ORDER — LOSARTAN POTASSIUM 100 MG/1
100 TABLET ORAL DAILY
Qty: 30 TABLET | Refills: 0 | Status: SHIPPED | OUTPATIENT
Start: 2019-03-14 | End: 2019-04-05

## 2019-03-14 NOTE — TELEPHONE ENCOUNTER
"Requested Prescriptions   Pending Prescriptions Disp Refills     losartan (COZAAR) 100 MG tablet [Pharmacy Med Name: LOSARTAN POTASSIUM 100 MG TAB]  Last Written Prescription Date:  3/27/2018  Last Fill Quantity: 90,  # refills: 3   Last office visit: 3/27/2018 with prescribing provider:  Evert   Future Office Visit:     90 tablet 3     Sig: TAKE 1 TABLET (100 MG) BY MOUTH DAILY    Angiotensin-II Receptors Failed - 3/13/2019  1:50 AM       Failed - Normal serum creatinine on file in past 12 months    Recent Labs   Lab Test 10/17/17  0757   CR 0.92            Failed - Normal serum potassium on file in past 12 months    Recent Labs   Lab Test 10/17/17  0757   POTASSIUM 4.2                   Passed - Blood pressure under 140/90 in past 12 months    BP Readings from Last 3 Encounters:   08/27/18 100/70   03/27/18 (!) 158/99   02/05/18 (!) 159/95                Passed - Recent (12 mo) or future (30 days) visit within the authorizing provider's specialty    Patient had office visit in the last 12 months or has a visit in the next 30 days with authorizing provider or within the authorizing provider's specialty.  See \"Patient Info\" tab in inbasket, or \"Choose Columns\" in Meds & Orders section of the refill encounter.             Passed - Medication is active on med list       Passed - Patient is age 18 or older          "

## 2019-03-14 NOTE — TELEPHONE ENCOUNTER
Medication is being filled for 1 time refill only due to:  Patient needs labs BMP. Patient needs to be seen because it has been more than one year since last visit.     Sent message with script to pharmacy and mailed letter to home.

## 2019-04-05 DIAGNOSIS — I10 ESSENTIAL HYPERTENSION: ICD-10-CM

## 2019-04-05 RX ORDER — LOSARTAN POTASSIUM 100 MG/1
TABLET ORAL
Qty: 15 TABLET | Refills: 0 | Status: SHIPPED | OUTPATIENT
Start: 2019-04-05 | End: 2019-04-15

## 2019-04-05 NOTE — TELEPHONE ENCOUNTER
"Requested Prescriptions   Pending Prescriptions Disp Refills     losartan (COZAAR) 100 MG tablet [Pharmacy Med Name: LOSARTAN POTASSIUM 100 MG TAB] 30 tablet 0     Sig: TAKE 1 TABLET BY MOUTH EVERY DAY-DUE FOR OFFICE VISIT FOR FURTHER REFILLS    Angiotensin-II Receptors Failed - 4/5/2019  1:31 PM       Failed - Recent (12 mo) or future (30 days) visit within the authorizing provider's specialty    Patient had office visit in the last 12 months or has a visit in the next 30 days with authorizing provider or within the authorizing provider's specialty.  See \"Patient Info\" tab in inbasket, or \"Choose Columns\" in Meds & Orders section of the refill encounter.             Failed - Normal serum creatinine on file in past 12 months    Recent Labs   Lab Test 10/17/17  0757   CR 0.92            Failed - Normal serum potassium on file in past 12 months    Recent Labs   Lab Test 10/17/17  0757   POTASSIUM 4.2                   Passed - Blood pressure under 140/90 in past 12 months    BP Readings from Last 3 Encounters:   08/27/18 100/70   03/27/18 (!) 158/99   02/05/18 (!) 159/95                Passed - Medication is active on med list       Passed - Patient is age 18 or older        Last Written Prescription Date:  3/14/19  Last Fill Quantity: 30,  # refills: 0   Last office visit: 3/27/18 with Evert  Future Office Visit:      Akanksha brownlee x1 and patient did not follow up, please advise  Patient was mailed letter to home and message sent with script to pharmacy.     15 pills approved.         "

## 2019-04-10 DIAGNOSIS — F33.0 MAJOR DEPRESSIVE DISORDER, RECURRENT EPISODE, MILD (H): ICD-10-CM

## 2019-04-10 RX ORDER — BUPROPION HYDROCHLORIDE 150 MG/1
150 TABLET ORAL EVERY MORNING
Qty: 30 TABLET | Refills: 0 | Status: SHIPPED | OUTPATIENT
Start: 2019-04-10 | End: 2019-04-15

## 2019-04-10 NOTE — TELEPHONE ENCOUNTER
Due for clinic appointment. 30 day johnathan fill given. Reminder letter sent.      Maribel LU RN

## 2019-04-10 NOTE — TELEPHONE ENCOUNTER
"Requested Prescriptions   Pending Prescriptions Disp Refills     buPROPion (WELLBUTRIN XL) 150 MG 24 hr tablet [Pharmacy Med Name: BUPROPION HCL  MG TABLET] 90 tablet 2     Sig: TAKE 1 TABLET (150 MG) BY MOUTH EVERY MORNING       SSRIs Protocol Failed - 4/10/2019  1:57 AM        Failed - PHQ-9 score less than 5 in past 6 months     Please review last PHQ-9 score.           Passed - Medication is Bupropion     If the medication is Bupropion (Wellbutrin), and the patient is taking for smoking cessation; OK to refill.          Passed - Medication is active on med list        Passed - Patient is age 18 or older        Passed - Recent (6 mo) or future (30 days) visit within the authorizing provider's specialty     Patient had office visit in the last 6 months or has a visit in the next 30 days with authorizing provider or within the authorizing provider's specialty.  See \"Patient Info\" tab in inbasket, or \"Choose Columns\" in Meds & Orders section of the refill encounter.            Last Written Prescription Date:  3/27/18  Last Fill Quantity: 90,  # refills: 3   Last office visit: 8/27/2018 with prescribing provider:  Evert   Future Office Visit:   Next 5 appointments (look out 90 days)    Apr 15, 2019  7:40 AM CDT  Office Visit with STU Younger CNP  [facility] ([facility] [address] [facility] [address]  [phone]           "

## 2019-04-10 NOTE — LETTER
[facility]  [address] [facility] [address]  Phone: [phone]       April 10, 2019         Kwabena Richards  [address] [place] [phone]            Dear Kwabena:    We are concerned about your health care.  We recently provided you with medication refills.  Many medications require routine follow-up with your doctor.    Your prescription(s) have been refilled for 30 days so you may have time for the above noted follow-up. Please call to schedule soon so we can assure you have an appointment before your next refills are needed.    Thank you,      ARCHANA Evangelista / dylan

## 2019-04-15 ENCOUNTER — OFFICE VISIT (OUTPATIENT)
Dept: FAMILY MEDICINE | Facility: CLINIC | Age: 59
End: 2019-04-15
Payer: COMMERCIAL

## 2019-04-15 VITALS
WEIGHT: 197 LBS | TEMPERATURE: 97.6 F | DIASTOLIC BLOOD PRESSURE: 88 MMHG | OXYGEN SATURATION: 97 % | HEART RATE: 64 BPM | SYSTOLIC BLOOD PRESSURE: 130 MMHG | BODY MASS INDEX: 32.82 KG/M2 | HEIGHT: 65 IN | RESPIRATION RATE: 12 BRPM

## 2019-04-15 DIAGNOSIS — Z12.5 SCREENING FOR PROSTATE CANCER: ICD-10-CM

## 2019-04-15 DIAGNOSIS — Z12.11 SPECIAL SCREENING FOR MALIGNANT NEOPLASMS, COLON: ICD-10-CM

## 2019-04-15 DIAGNOSIS — Z13.6 CARDIOVASCULAR SCREENING; LDL GOAL LESS THAN 130: ICD-10-CM

## 2019-04-15 DIAGNOSIS — F33.0 MAJOR DEPRESSIVE DISORDER, RECURRENT EPISODE, MILD (H): Primary | ICD-10-CM

## 2019-04-15 DIAGNOSIS — I10 ESSENTIAL HYPERTENSION: ICD-10-CM

## 2019-04-15 DIAGNOSIS — M79.644 BILATERAL THUMB PAIN: ICD-10-CM

## 2019-04-15 DIAGNOSIS — M79.645 BILATERAL THUMB PAIN: ICD-10-CM

## 2019-04-15 LAB
ANION GAP SERPL CALCULATED.3IONS-SCNC: 5 MMOL/L (ref 3–14)
BUN SERPL-MCNC: 18 MG/DL (ref 7–30)
CALCIUM SERPL-MCNC: 8.3 MG/DL (ref 8.5–10.1)
CHLORIDE SERPL-SCNC: 113 MMOL/L (ref 94–109)
CHOLEST SERPL-MCNC: 144 MG/DL
CO2 SERPL-SCNC: 24 MMOL/L (ref 20–32)
CREAT SERPL-MCNC: 0.83 MG/DL (ref 0.66–1.25)
GFR SERPL CREATININE-BSD FRML MDRD: >90 ML/MIN/{1.73_M2}
GLUCOSE SERPL-MCNC: 105 MG/DL (ref 70–99)
HDLC SERPL-MCNC: 34 MG/DL
LDLC SERPL CALC-MCNC: 78 MG/DL
NONHDLC SERPL-MCNC: 110 MG/DL
POTASSIUM SERPL-SCNC: 4.2 MMOL/L (ref 3.4–5.3)
PSA SERPL-ACNC: 0.39 UG/L (ref 0–4)
SODIUM SERPL-SCNC: 142 MMOL/L (ref 133–144)
TRIGL SERPL-MCNC: 162 MG/DL

## 2019-04-15 PROCEDURE — 99214 OFFICE O/P EST MOD 30 MIN: CPT | Performed by: NURSE PRACTITIONER

## 2019-04-15 PROCEDURE — 80048 BASIC METABOLIC PNL TOTAL CA: CPT | Performed by: NURSE PRACTITIONER

## 2019-04-15 PROCEDURE — 36415 COLL VENOUS BLD VENIPUNCTURE: CPT | Performed by: NURSE PRACTITIONER

## 2019-04-15 PROCEDURE — G0103 PSA SCREENING: HCPCS | Performed by: NURSE PRACTITIONER

## 2019-04-15 PROCEDURE — 80061 LIPID PANEL: CPT | Performed by: NURSE PRACTITIONER

## 2019-04-15 RX ORDER — LOSARTAN POTASSIUM 100 MG/1
100 TABLET ORAL DAILY
Qty: 90 TABLET | Refills: 3 | Status: SHIPPED | OUTPATIENT
Start: 2019-04-15 | End: 2020-03-27

## 2019-04-15 RX ORDER — DULOXETIN HYDROCHLORIDE 60 MG/1
60 CAPSULE, DELAYED RELEASE ORAL DAILY
Qty: 90 CAPSULE | Refills: 3 | Status: SHIPPED | OUTPATIENT
Start: 2019-04-15 | End: 2020-04-10

## 2019-04-15 RX ORDER — LOSARTAN POTASSIUM 100 MG/1
TABLET ORAL
Qty: 15 TABLET | Refills: 0 | Status: CANCELLED | OUTPATIENT
Start: 2019-04-15

## 2019-04-15 RX ORDER — BUPROPION HYDROCHLORIDE 150 MG/1
150 TABLET ORAL EVERY MORNING
Qty: 90 TABLET | Refills: 3 | Status: SHIPPED | OUTPATIENT
Start: 2019-04-15 | End: 2020-04-27

## 2019-04-15 ASSESSMENT — PATIENT HEALTH QUESTIONNAIRE - PHQ9
SUM OF ALL RESPONSES TO PHQ QUESTIONS 1-9: 0
5. POOR APPETITE OR OVEREATING: NOT AT ALL

## 2019-04-15 ASSESSMENT — ANXIETY QUESTIONNAIRES
3. WORRYING TOO MUCH ABOUT DIFFERENT THINGS: NOT AT ALL
7. FEELING AFRAID AS IF SOMETHING AWFUL MIGHT HAPPEN: NOT AT ALL
6. BECOMING EASILY ANNOYED OR IRRITABLE: NOT AT ALL
5. BEING SO RESTLESS THAT IT IS HARD TO SIT STILL: NOT AT ALL
1. FEELING NERVOUS, ANXIOUS, OR ON EDGE: NOT AT ALL
GAD7 TOTAL SCORE: 0
2. NOT BEING ABLE TO STOP OR CONTROL WORRYING: NOT AT ALL

## 2019-04-15 ASSESSMENT — MIFFLIN-ST. JEOR: SCORE: 1627.53

## 2019-04-15 NOTE — PATIENT INSTRUCTIONS
Thank you for choosing [facility].  You may be receiving an email and/or telephone survey request from [facility] Customer Experience regarding your visit today.  Please take a few minutes to respond to the survey to let us know how we are doing.      If you have questions or concerns, please contact us via [employer] or you can contact your care team at [phone].    Our Clinic hours are:  Monday 6:40 am  to 7:00 pm  Tuesday -Friday 6:40 am to 5:00 pm    The [place] outpatient lab hours are:  Monday - Friday 6:10 am to 4:45 pm  Saturdays 7:00 am to 11:00 am  Appointments are required, call [phone]    If you have clinical questions after hours or would like to schedule an appointment,  call the clinic at [phone].

## 2019-04-15 NOTE — LETTER
April 15, 2019      Kwabena Richards  [address] [place] [phone]        Dear ,    We are writing to inform you of your test results.  Blood sugar is mildly elevated.  Continue to work on limiting sugars.    PSA is normal.    Cholesterol:  Triglycerides are much improved with your weight loss.  Your HDL cholesterol (good cholesterol) is low. HDL helps your body get rid of the bad cholesterol and is cardio-protective. Exercise helps increase your HDL.    Resulted Orders   Lipid panel reflex to direct LDL Fasting   Result Value Ref Range    Cholesterol 144 <200 mg/dL    Triglycerides 162 (H) <150 mg/dL      Comment:      Borderline high:  150-199 mg/dl  High:             200-499 mg/dl  Very high:       >499 mg/dl  Fasting specimen      HDL Cholesterol 34 (L) >39 mg/dL    LDL Cholesterol Calculated 78 <100 mg/dL      Comment:      Desirable:       <100 mg/dl    Non HDL Cholesterol 110 <130 mg/dL   Basic metabolic panel   Result Value Ref Range    Sodium 142 133 - 144 mmol/L    Potassium 4.2 3.4 - 5.3 mmol/L    Chloride 113 (H) 94 - 109 mmol/L    Carbon Dioxide 24 20 - 32 mmol/L    Anion Gap 5 3 - 14 mmol/L    Glucose 105 (H) 70 - 99 mg/dL      Comment:      Fasting specimen    Urea Nitrogen 18 7 - 30 mg/dL    Creatinine 0.83 0.66 - 1.25 mg/dL    GFR Estimate >90 >60 mL/min/[1.73_m2]      Comment:      Non  GFR Calc  Starting 12/18/2018, serum creatinine based estimated GFR (eGFR) will be   calculated using the Chronic Kidney Disease Epidemiology Collaboration   (CKD-EPI) equation.      GFR Estimate If Black >90 >60 mL/min/[1.73_m2]      Comment:       GFR Calc  Starting 12/18/2018, serum creatinine based estimated GFR (eGFR) will be   calculated using the Chronic Kidney Disease Epidemiology Collaboration   (CKD-EPI) equation.      Calcium 8.3 (L) 8.5 - 10.1 mg/dL   PSA, screen   Result Value Ref Range    PSA 0.39 0 - 4 ug/L      Comment:      Assay Method:   Chemiluminescence using Siemens Vista analyzer       If you have any questions or concerns, please call the clinic at the number listed above.       Sincerely,        STU Maya CNP

## 2019-04-15 NOTE — PROGRESS NOTES
SUBJECTIVE:   Kwabena Richards is a 58 year old male who presents to clinic today for the following   health issues:    Hypertension Follow-up      Outpatient blood pressures are not being checked.    Low Salt Diet: low salt    Has lost a lot of weight over the last year or two - he thinks this is helping his BP    Depression and Anxiety Follow-Up    Status since last visit: No change- stable    He feels that his medications are working well.    Other associated symptoms:None    Complicating factors:     Significant life event: No     Current substance abuse: None    PHQ 10/17/2017 3/27/2018 4/15/2019   PHQ-9 Total Score 0 1 0   Q9: Thoughts of better off dead/self-harm past 2 weeks Not at all Not at all Not at all     VISHNU-7 SCORE 10/17/2017 3/27/2018 4/15/2019   Total Score - - -   Total Score 0 0 0         Amount of exercise or physical activity: None    Problems taking medications regularly: No    Medication side effects: none    Diet: regular (no restrictions)        Musculoskeletal problem/pain      Duration: probably a few months    Description  Location: base of both thumbs    Intensity:  Moderate to severe    Accompanying signs and symptoms: tingling    History  Previous similar problem: no   Previous evaluation:  none    Precipitating or alleviating factors:  Trauma or overuse: YES- uses computer a lot for his job  Aggravating factors include: overuse    Therapies tried and outcome: support wrap- helps a little    Patient has osteoarthritis in multiple other joints and is wondering if this is arthritis too.      Additional history: as documented    Reviewed  and updated as needed this visit by clinical staff  Tobacco  Allergies  Meds  Problems  Med Hx  Surg Hx  Fam Hx  Soc Hx          Reviewed and updated as needed this visit by Provider  Tobacco  Allergies  Meds  Problems  Med Hx  Surg Hx  Fam Hx           ROS:  Constitutional, HEENT, cardiovascular, pulmonary, gi and gu systems are negative,  "except as otherwise noted.    OBJECTIVE:     /88   Pulse 64   Temp 97.6  F (36.4  C) (Tympanic)   Resp 12   Ht 1.638 m (5' 4.5\")   Wt 89.4 kg (197 lb)   SpO2 97%   BMI 33.29 kg/m    Body mass index is 33.29 kg/m .  GENERAL: healthy, alert and no distress  HENT: ear canals and TM's normal, nose and mouth without ulcers or lesions  NECK: no adenopathy, no asymmetry, masses, or scars and thyroid normal to palpation  RESP: lungs clear to auscultation - no rales, rhonchi or wheezes  CV: regular rate and rhythm, normal S1 S2, no S3 or S4, no murmur, click or rub, no peripheral edema and peripheral pulses strong  MS: Bilateral hands: appearance normal. No erythema or edema. Tenderness over bilateral CMC joints. Strength normal. ROM normal.      ASSESSMENT/PLAN:       ICD-10-CM    1. Major depressive disorder, recurrent episode, mild (H) F33.0 Well controlled.  buPROPion (WELLBUTRIN XL) 150 MG 24 hr tablet     DULoxetine (CYMBALTA) 60 MG capsule     2. Essential hypertension I10 Well controlled.  losartan (COZAAR) 100 MG tablet     Basic metabolic panel     3. Bilateral thumb pain M79.644 Likely CMC joint osteoarthritis.  Discussed the support wraps, ice, NSAIDs if needed.  Discussed ortho referral to consider steroid injections - he will hold off on this for now.    M79.645    4. Special screening for malignant neoplasms, colon Z12.11 Fecal colorectal cancer screen FIT   5. CARDIOVASCULAR SCREENING; LDL GOAL LESS THAN 130 Z13.6 Lipid panel reflex to direct LDL Fasting   6. Screening for prostate cancer Z12.5 PSA, screen         The risks, benefits and treatment options of prescribed medications or other treatments have been discussed with the patient. The patient verbalized their understanding and should call or follow up if no improvement or if they develop further problems.    STU Maya [facility]      "

## 2019-04-16 ASSESSMENT — ANXIETY QUESTIONNAIRES: GAD7 TOTAL SCORE: 0

## 2019-04-25 ENCOUNTER — OFFICE VISIT (OUTPATIENT)
Dept: SLEEP MEDICINE | Facility: CLINIC | Age: 59
End: 2019-04-25
Payer: COMMERCIAL

## 2019-04-25 VITALS
OXYGEN SATURATION: 98 % | WEIGHT: 197 LBS | SYSTOLIC BLOOD PRESSURE: 143 MMHG | BODY MASS INDEX: 32.82 KG/M2 | HEIGHT: 65 IN | HEART RATE: 85 BPM | DIASTOLIC BLOOD PRESSURE: 94 MMHG

## 2019-04-25 DIAGNOSIS — G47.33 OSA (OBSTRUCTIVE SLEEP APNEA): Primary | ICD-10-CM

## 2019-04-25 PROCEDURE — 99214 OFFICE O/P EST MOD 30 MIN: CPT | Performed by: FAMILY MEDICINE

## 2019-04-25 ASSESSMENT — MIFFLIN-ST. JEOR: SCORE: 1627.53

## 2019-04-25 NOTE — PATIENT INSTRUCTIONS

## 2019-04-25 NOTE — PROGRESS NOTES
Obstructive Sleep Apnea - PAP Follow-Up Visit:    Chief Complaint   Patient presents with     CPAP Follow Up     Needs order for new machine and supplies. Pt states that the machine is running well. No sleep concerns.       Kwabena Richards comes in today for annual follow-up of severe RAÚL with possible sustained hypoxemia and/or REM hypoventilation (HST 12/4/2013 with AHI 36.7, je desat 75%, baseline SpO2 at ~90%) treated with auto-titrate CPAP 12-17 cm H2O. Titration PSG strongly advised, but patient declined.    1/14/2014 - CPAP compliance follow-up.  He returns today and feels the CPAP is working well, no snoring or stopping breathing. Uses it every night, only occasional issues with mask leak.  CPAP download from 12/14/2013 - 1/12/2014 on auto-titrate CPAP 5-15 cm H2O. Average daily usage of 7:26 and used >= 4 hours on 100% of nights. Pressure 90th%ile of 14.2 cm H2O. AHI 10.8.  A/P to change to auto-titrate 12-17 cm H2O, overnight oximetry on CPAP.    1/27/2014 - LADAN on CPAP.  SpO2 normal and stable on current CPAP settings.    2/28/2017 - Returns for follow-up to get new supplies.  Did not bring CPAP for download today.  He feels it continues to work well, wears on a nightly basis.  No residual snoring or apnea.  No new cardiopulmonary concerns.  A/P with recommendation for all-night PAP titration but declined by patient, recommended to bring CPAP for download.    Today - Returns for annual follow-up.  He feels he is sleeping well and has no concerns with his CPAP today.    CPAP download from 3/26/2019 - 4/24/2019 on auto-titrate 12-17 cm H2O.  Used 30/30 days, average usage of 8 hours 12 minutes.  Pressure mean 13 cm H2O, 90th%ile 14.6 cm H2O.  AHI 5.1.    Problem List:  Patient Active Problem List    Diagnosis Date Noted     Mild major depression (H) 12/28/2011     Priority: High     Better on cymbalta        DJD (degenerative joint disease) of knee 08/07/2009     Priority: High     Non morbid obesity  "due to excess calories 08/29/2016     Priority: Medium     Hyperlipidemia LDL goal <130 05/13/2014     Priority: Medium     RAÚL (obstructive sleep apnea) 12/05/2013     Priority: Medium     Severe RAÚL with possible sustained hypoxemia and/or REM hypoventilation   - 12/4/2013 with AHI 36.7, je desat 75%, period at end of study suspicious for REM with significant increase in obstructive events and sustained hypoxemia.  Basal SpO2 also low-normal at ~90%.       Mixed hyperlipidemia 11/11/2013     Priority: Medium     Status post left partial knee replacement 10/16/2013     Priority: Medium     2009         Osteoarthritis of multiple joints 10/16/2013     Priority: Medium     Essential hypertension 12/28/2011     Priority: Medium     Rhinitis medicamentosa 06/28/2010     Priority: Medium     Diverticulosis of large intestine 02/10/2005     Priority: Medium     Colonoscopy 10/04  No polyps    Problem list name updated by automated process. Provider to review            BP (!) 143/94   Pulse 85   Ht 1.638 m (5' 4.5\")   Wt 89.4 kg (197 lb)   SpO2 98%   BMI 33.29 kg/m      Impression/Plan:    1.) Severe RAÚL with possible sustained hypoxemia and/or REM hypoventilation, currently treated with auto-titrate CPAP 12-17 cm H2O  - 12/4/2013 with AHI 36.7, je desat 75%, period at end of study suspicious for REM with significant increase in obstructive events and sustained hypoxemia. Basal SpO2 also low-normal at ~90%.   - Appears well controlled per download and patient on CPAP auto-titrate 12-17 cm H2O.    - Continue on current settings, recommend overnight oximetry on CPAP at next visit.       Kwabena Richards will follow up in about 1 year(s).     Twenty-five minutes spent with patient, all of which were spent face-to-face counseling, consulting, coordinating plan of care.      Eleazar Prince MD, MD    CC:  Shraddha Loyd  "

## 2019-06-01 DIAGNOSIS — M17.0 PRIMARY OSTEOARTHRITIS OF BOTH KNEES: ICD-10-CM

## 2019-06-03 NOTE — TELEPHONE ENCOUNTER
"Requested Prescriptions   Pending Prescriptions Disp Refills     celecoxib (CELEBREX) 100 MG capsule [Pharmacy Med Name: CELECOXIB 100 MG CAPSULE] 180 capsule 3     Sig: TAKE TWO CAPSULES BY MOUTH EVERY DAY   Last Written Prescription Date:  3/27/18  Last Fill Quantity: 180 cap,  # refills: 3   Last office visit: 4/15/2019 with prescribing provider:  Shraddha Loyd     Future Office Visit:        NSAID Medications Failed - 6/1/2019  8:41 AM        Failed - Blood pressure under 140/90 in past 12 months     BP Readings from Last 3 Encounters:   04/25/19 (!) 143/94   04/15/19 130/88   08/27/18 100/70                 Failed - Normal ALT on file in past 12 months     Recent Labs   Lab Test 08/29/16  1639   ALT 28             Failed - Normal AST on file in past 12 months     Recent Labs   Lab Test 08/29/16  1639   AST 18             Failed - Normal CBC on file in past 12 months     Recent Labs   Lab Test 01/28/12  0929   WBC 7.5   RBC 4.71   HGB 14.5   HCT 44.7                    Passed - Recent (12 mo) or future (30 days) visit within the authorizing provider's specialty     Patient had office visit in the last 12 months or has a visit in the next 30 days with authorizing provider or within the authorizing provider's specialty.  See \"Patient Info\" tab in inbasket, or \"Choose Columns\" in Meds & Orders section of the refill encounter.              Passed - Patient is age 6-64 years        Passed - Medication is active on med list        Passed - Normal serum creatinine on file in past 12 months     Recent Labs   Lab Test 04/15/19  0812   CR 0.83               "

## 2019-06-04 RX ORDER — CELECOXIB 100 MG/1
CAPSULE ORAL
Qty: 180 CAPSULE | Refills: 0 | Status: SHIPPED | OUTPATIENT
Start: 2019-06-04 | End: 2019-09-03

## 2019-06-04 NOTE — TELEPHONE ENCOUNTER
Routing refill request to provider for review/approval because:  Labs out of range:  BP  Labs not current:  ALT, AST last completed 2016;   CBC last completed 2012

## 2019-09-03 DIAGNOSIS — M17.0 PRIMARY OSTEOARTHRITIS OF BOTH KNEES: ICD-10-CM

## 2019-09-04 RX ORDER — CELECOXIB 100 MG/1
CAPSULE ORAL
Qty: 180 CAPSULE | Refills: 3 | Status: SHIPPED | OUTPATIENT
Start: 2019-09-04 | End: 2020-08-26

## 2019-09-04 NOTE — TELEPHONE ENCOUNTER
Med refilled.  Last office visit with me, BP was normal.  Mildly elevated BP was at the sleep clinic.  Shraddha Loyd, CNP

## 2019-09-04 NOTE — TELEPHONE ENCOUNTER
Routing refill request to provider for review/approval because:  Labs out of range:  High bp - LOV notes 4-15-19 states that BP is well controlled, recheck 1 yr.  Labs not current:  AST, ALT, CBC - all ready.    Juli CHAIREZ RN

## 2019-10-24 ENCOUNTER — TELEPHONE (OUTPATIENT)
Dept: FAMILY MEDICINE | Facility: CLINIC | Age: 59
End: 2019-10-24

## 2019-10-24 NOTE — LETTER
October 24, 2019      Kwabena Richards  [address] [place] [phone]      Dear Kwabena Richards, [phone]    At [facility] we care about your health and are committed to providing quality patient care, which includes staying current on preventative cancer screenings.  You can increase your chances of finding and treating cancers through regular screenings.      Our records show that you are due for the following screening(s):    Colon Cancer Screening- Recommended every 5-10 years, depending on your history, in order to prevent and detect colon cancer at its earliest stages.  Colon cancer is now the second leading cause of death in the [place] for both men and women and there are over 130,000 new cases and 50,000 deaths per year from colon cancer.  Colonoscopies can prevent 90-95% of these deaths.  Problem lesions can be removed before they ever become cancer.  This test is not only looking for cancer, but also getting rid of precancerous lesions.  You are usually given some sedation which makes the test very comfortable for most people.     If you do not wish to do a colonoscopy or cannot afford to do one, at this time, there is another option.  It is called a Fit test or Fecal Immunochemical Occult Blood Test (take home stool sample kit).  It does not replace the colonoscopy for colorectal cancer screening, but it can detect hidden bleeding in the lower colon.  It does need to be repeated every year and if a positive result is obtained, you would be referred for a colonoscopy.  The FIT test is really easy to do and does not require any diet or medication restrictions and involves only one collection sample.       If you have completed either one of these tests or had a flexible sigmoidoscopy in the past five years at another facility, please let us know so that we can update your records.  Please call us ([phone]) if you have questions or would like to arrange either to do a  colonoscopy or obtain the necessary test kit for the Fecal Occult Test.         Quality Committee   [facility]

## 2019-11-11 ENCOUNTER — OFFICE VISIT (OUTPATIENT)
Dept: FAMILY MEDICINE | Facility: CLINIC | Age: 59
End: 2019-11-11
Payer: COMMERCIAL

## 2019-11-11 ENCOUNTER — ANCILLARY PROCEDURE (OUTPATIENT)
Dept: GENERAL RADIOLOGY | Facility: CLINIC | Age: 59
End: 2019-11-11
Attending: NURSE PRACTITIONER
Payer: COMMERCIAL

## 2019-11-11 VITALS
BODY MASS INDEX: 34.16 KG/M2 | WEIGHT: 205 LBS | HEART RATE: 85 BPM | HEIGHT: 65 IN | TEMPERATURE: 99.3 F | OXYGEN SATURATION: 95 % | DIASTOLIC BLOOD PRESSURE: 95 MMHG | SYSTOLIC BLOOD PRESSURE: 140 MMHG

## 2019-11-11 DIAGNOSIS — M79.604 RIGHT LEG PAIN: Primary | ICD-10-CM

## 2019-11-11 DIAGNOSIS — M25.511 CHRONIC PAIN OF BOTH SHOULDERS: ICD-10-CM

## 2019-11-11 DIAGNOSIS — M65.30 TRIGGER FINGER, ACQUIRED: ICD-10-CM

## 2019-11-11 DIAGNOSIS — G89.29 CHRONIC PAIN OF BOTH SHOULDERS: ICD-10-CM

## 2019-11-11 DIAGNOSIS — M25.512 CHRONIC PAIN OF BOTH SHOULDERS: ICD-10-CM

## 2019-11-11 DIAGNOSIS — D22.9 BENIGN MOLE: ICD-10-CM

## 2019-11-11 PROCEDURE — 99214 OFFICE O/P EST MOD 30 MIN: CPT | Performed by: NURSE PRACTITIONER

## 2019-11-11 PROCEDURE — 73030 X-RAY EXAM OF SHOULDER: CPT | Mod: RT

## 2019-11-11 PROCEDURE — 73030 X-RAY EXAM OF SHOULDER: CPT | Mod: LT

## 2019-11-11 ASSESSMENT — MIFFLIN-ST. JEOR: SCORE: 1658.81

## 2019-11-11 NOTE — PROGRESS NOTES
SUBJECTIVE    HPI: Kwabena Richards is a 59-year-old male presenting today with leg pain, thumb pain, and a mole.    Leg pain  Pain started one month ago. It was not preceded by an injury or trauma. Pain is in his R leg behind the knee. He does not feel that the knee is involved. Leg muscles tighten up when sitting or inactivity and when he starts to move again they are very tight and painful. Wakes him up in the night- throbbing. Pain goes from mid thigh to ankle. No foot pain. Denies weakness, numbness or tingling.    Aggravating factors: lifting foot up like climbing stairs or putting foot into car  Alleviating factors: Stretching helps. Takes tylenol PM at night and celebrex in the morning for other arthritis pain- doesn't help this pain.    Hand/thumb pain  In the last week or so ring finger on L hand locks. It hurts.     For the last 9 months he has had ongoing thumb pain. Pain is at the base of both of his thumbs. It is not in the wrists. He does endorse a tingling that goes down his R arm periodically. He was seen for this pain in April and it has not improved since then. Overuse may be a factor.    Aggravating factors: using his thumbs  Alleviating factors: Sometimes using support wraps on hands. Helps a little bit. Drinking cherry juice daily for years. Not sure if it still makes a difference.    Mole  Mole on L cheek has grown a little bit. His mother has had multiple lesions of melanoma removed from her face despite not spending time in the sun. He is concerned that he may have the same.    Past Medical History:   Diagnosis Date     Diverticulitis      Hematuria      Current Outpatient Medications   Medication     ASPIRIN 81 MG OR CHEW     buPROPion (WELLBUTRIN XL) 150 MG 24 hr tablet     celecoxib (CELEBREX) 100 MG capsule     Cholecalciferol (VITAMIN D PO)     Coenzyme Q10 (CO Q 10) 100 MG CAPS     Diphenhydramine-APAP, sleep, (TYLENOL PM EXTRA STRENGTH PO)     DULoxetine (CYMBALTA) 60 MG capsule     fish  "oil-omega-3 fatty acids (EQL FISH OIL) 1000 MG capsule     fluticasone (FLONASE) 50 MCG/ACT nasal spray     Garlic 2 MG CAPS     GLUCOSAMINE CHONDROITIN COMPLX PO     latanoprost (XALATAN) 0.005 % ophthalmic solution     losartan (COZAAR) 100 MG tablet     MULTIPLE VITAMIN OR     ORDER FOR DME     vitamin  B complex with vitamin C (VITAMIN  B COMPLEX) TABS     ZINC OR     No current facility-administered medications for this visit.      Past Surgical History:   Procedure Laterality Date     JOINT REPLACEMTN, KNEE RT/LT  2009    Joint Replacement knee /LT     SURGICAL HISTORY OF -   early 1990's    Right knee arthroscopy for meniscus tear     SURGICAL HISTORY OF -   3/12/2004    Right rotator cuff repair     SURGICAL HISTORY OF -   9/22/2004    Cystoscopy     SURGICAL HISTORY OF -   2005    Removal of loose bodies, partial medial meniscectomy     Family History   Problem Relation Age of Onset     Hypertension Mother      Arthritis Mother      Cancer Mother         skin     Cancer Father         liver, stomach     Cancer Maternal Grandmother      Circulatory Maternal Grandfather         blood clot     Cancer Paternal Grandmother      Cerebrovascular Disease Paternal Grandfather      Genitourinary Problems Son         blastoma     ROS: Negative except for as noted in HPI.    OBJECTIVE  BP (!) 140/95   Pulse 85   Temp 99.3  F (37.4  C) (Tympanic)   Ht 1.638 m (5' 4.5\")   Wt 93 kg (205 lb)   SpO2 95%   BMI 34.64 kg/m    Exam:  Constitutional: healthy, alert and no distress  Musculoskeletal: negative Phalen's sign, full ROM of wrists and fingers; POSITIVE for decreased range of motion of R knee- flexion painful, pain with resistance, and tender to palpation of bilateral base of thumbs, locking motion of L ring finger with flexion  Skin: light brown nevi about 1cm in diameter on L cheek    Diagnostics:   - XR shoulder left 2 views  - XR shoulder right 2 views    ASSESSMENT/PLAN  1. Right leg pain  Worsening. Will " refer to PT. Patient given exercises for hamstring which he would like to try prior to going to PT. Follow-up in one month if not improved. Next step would be a referral to ortho to examine arthritis.  - referral to physical therapy    2. Trigger finger, acquired  New. Will refer for a steroid injection with the hand surgeon.  - ortho  referral    3. Chronic pain of both shoulders  Unchanged. We will get an xray of both shoulders to evaluate extent of arthritis and relationship to tingling in right arm and pain in hands.  - XR shoulder left 2 views  - XR shoulder right 2 views    Ragini Williamson, RN BSN  FNP student

## 2019-11-11 NOTE — PROGRESS NOTES
Subjective     Kwabena Richards is a 59 year old male who presents to clinic today for the following health issues:    HPI     Chief Complaint   Patient presents with     Musculoskeletal Problem     right leg pain;  recheck of bilateral thumb pain,  left ring finger - locking up     Derm Problem     check mole on cheek  Not bleeding  Not sure how long its been there  Concerned because mother has history of melanoma       Musculoskeletal problem/pain- recheck      Duration: 9 months    Description  Location: bilateral hand/ thumb pain- seen in April , not improving  Left middle finger locking up      Intensity:  moderate    Accompanying signs and symptoms: none    History  Previous similar problem: no   Previous evaluation:  Seen for thumb/hand pain back in April  History of osteoarthritis in multiple joints    Precipitating or alleviating factors:  Trauma or overuse: no   Aggravating factors include: overuse    Therapies tried and outcome: cherry juice for joint pain; has used support wraps in the past    Musculoskeletal problem/pain      Duration: one month    Description  Location: right leg- behind knee an into the calf area  Muscle tightening up  Throbbing pain    Intensity:  Severe at times    Accompanying signs and symptoms: none    History  Previous similar problem: no   Previous evaluation:  none    Precipitating or alleviating factors:  Trauma or overuse: no   Aggravating factors include: none    Therapies tried and outcome: stretching      Bilateral shoulder pain:  Anterior pain.  Pain with reaching above head.  Some tingling down the right arm  Known h/o osteoarthritis in numerous joints.  H/o right rotator cuff tear 2004  No shoulder xrays since.          Reviewed and updated as needed this visit by Provider         Review of Systems   ROS COMP: Constitutional, HEENT, cardiovascular, pulmonary, gi and gu systems are negative, except as otherwise noted.      Objective    BP (!) 140/95   Pulse 85   Temp  "99.3  F (37.4  C) (Tympanic)   Ht 1.638 m (5' 4.5\")   Wt 93 kg (205 lb)   SpO2 95%   BMI 34.64 kg/m    Body mass index is 34.64 kg/m .  Physical Exam   GENERAL: healthy, alert and no distress  MS: shoulder exam: appearance normal, range of motion normal  knee exam normal knee exam, no swelling, tenderness, effusion or instability; ligaments intact, full ROM.  Left middle finger - locking noted on exam  SKIN: left cheek - 3 mm raised brown nevus               Assessment & Plan       ICD-10-CM    1. Right leg pain M79.604 Pain in hamstring and calf.  Recommend PHYSICAL THERAPY REFERRAL  If no improvement, consider repeating xrays to further evaluate the knee arthritis.     2. Trigger finger, acquired M65.30 ORTHO  REFERRAL - to hand surgery to consider injection vs release     3. Chronic pain of both shoulders M25.511 Likely arthritis.  Today:  XR Shoulder Right 2 Views    G89.29 XR Shoulder Left 2 Views    M25.512    4. Benign mole D22.9 Reassured patient that mole appearance was benign.        BMI:   Estimated body mass index is 34.64 kg/m  as calculated from the following:    Height as of this encounter: 1.638 m (5' 4.5\").    Weight as of this encounter: 93 kg (205 lb).   Weight management plan: Discussed healthy diet and exercise guidelines        Patient Instructions     Your clinic record indicates that you are due for:   Colonoscopy or yearly stool blood testing (FIT)  Please return FIT kit    Thank you for choosing [facility]  You may be receiving an email and/or telephone survey request from [facility] Customer Experience regarding your visit today.  Please take a few minutes to respond to the survey to let us know how we are doing.      If you have questions or concerns, please contact us via [employer] or you can contact your care team at [phone].    Our Clinic hours are:  Monday 6:40 am  to 7:00 pm  Tuesday -Friday 6:40 am to 5:00 pm    The [place] outpatient lab hours are:  Monday - " Friday 6:10 am to 4:45 pm  Saturdays 7:00 am to 11:00 am  Appointments are required, call [phone]    If you have clinical questions after hours or would like to schedule an appointment,  call the clinic at [phone].        Return in about 4 weeks (around 12/9/2019), or if symptoms worsen or fail to improve.      The risks, benefits and treatment options of prescribed medications or other treatments have been discussed with the patient. The patient verbalized their understanding and should call or follow up if no improvement or if they develop further problems.    STU Evangelista [facility]

## 2019-11-11 NOTE — PATIENT INSTRUCTIONS
Your clinic record indicates that you are due for:   Colonoscopy or yearly stool blood testing (FIT)  Please return FIT kit    Thank you for choosing [facility].  You may be receiving an email and/or telephone survey request from [facility] Customer Experience regarding your visit today.  Please take a few minutes to respond to the survey to let us know how we are doing.      If you have questions or concerns, please contact us via [employer] or you can contact your care team at [phone].    Our Clinic hours are:  Monday 6:40 am  to 7:00 pm  Tuesday -Friday 6:40 am to 5:00 pm    The [place] outpatient lab hours are:  Monday - Friday 6:10 am to 4:45 pm  Saturdays 7:00 am to 11:00 am  Appointments are required, call [phone]    If you have clinical questions after hours or would like to schedule an appointment,  call the clinic at [phone].

## 2019-11-12 DIAGNOSIS — G89.29 CHRONIC PAIN OF BOTH SHOULDERS: Primary | ICD-10-CM

## 2019-11-12 DIAGNOSIS — M25.512 CHRONIC PAIN OF BOTH SHOULDERS: Primary | ICD-10-CM

## 2019-11-12 DIAGNOSIS — M25.511 CHRONIC PAIN OF BOTH SHOULDERS: Primary | ICD-10-CM

## 2019-11-15 ENCOUNTER — OFFICE VISIT (OUTPATIENT)
Dept: ORTHOPEDICS | Facility: CLINIC | Age: 59
End: 2019-11-15
Payer: COMMERCIAL

## 2019-11-15 VITALS
HEIGHT: 65 IN | BODY MASS INDEX: 34.16 KG/M2 | SYSTOLIC BLOOD PRESSURE: 131 MMHG | DIASTOLIC BLOOD PRESSURE: 83 MMHG | WEIGHT: 205 LBS

## 2019-11-15 DIAGNOSIS — M25.512 CHRONIC PAIN OF BOTH SHOULDERS: Primary | ICD-10-CM

## 2019-11-15 DIAGNOSIS — G89.29 CHRONIC PAIN OF BOTH SHOULDERS: Primary | ICD-10-CM

## 2019-11-15 DIAGNOSIS — M25.511 CHRONIC PAIN OF BOTH SHOULDERS: Primary | ICD-10-CM

## 2019-11-15 DIAGNOSIS — M75.101 TEAR OF RIGHT ROTATOR CUFF, UNSPECIFIED TEAR EXTENT, UNSPECIFIED WHETHER TRAUMATIC: ICD-10-CM

## 2019-11-15 PROCEDURE — 99203 OFFICE O/P NEW LOW 30 MIN: CPT | Performed by: PEDIATRICS

## 2019-11-15 ASSESSMENT — MIFFLIN-ST. JEOR: SCORE: 1658.81

## 2019-11-15 NOTE — LETTER
11/15/2019         RE: Kwabena Richards  [address] [place] [address]        Dear Colleague,    Thank you for referring your patient, Kwabena Richards, to the [facility]. Please see a copy of my visit note below.    Sports Medicine Clinic Visit    PCP: Shraddha Loyd    Kwabena Richards is a 59 year old male who is seen  in consultation at the request of  Shraddha Loyd C.N.P. presenting with bilateral shoulder pain.    Injury: He reports bilateral shoulder pain for 3 months. He reports the majority of his pain is in the anterior shoulder. He reports difficulty lifting his arm over head. He reports no injury. He has tingling in his right arm occasionally. He has chronic neck pain.    Location of Pain: bilateral anterior shoulder  Duration of Pain: 3 month(s)  Rating of Pain at worst: 7/10  Rating of Pain Currently: 2/10  Symptoms are better with: Rest  Symptoms are worse with: overhead motions and reaching  Additional Features:   Positive: paresthesias and weakness   Negative: swelling, bruising, popping, grinding, catching, locking, instability and numbness  Other evaluation and/or treatments so far consists of: Nothing  Prior History of related problems: right shoulder RTC repair in 2012    Social History: desk work    Review of Systems  Skin: no bruising, no swelling  Musculoskeletal: as above  Neurologic: no numbness, paresthesias  Remainder of review of systems is negative including constitutional, CV, pulmonary, GI, except as noted in HPI or medical history.    Patient's current problem list, past medical and surgical history, and family history were reviewed.    Patient Active Problem List   Diagnosis     Diverticulosis of large intestine     DJD (degenerative joint disease) of knee     Rhinitis medicamentosa     Mild major depression (H)     Essential hypertension     Status post left partial knee replacement     Osteoarthritis of multiple joints     Mixed  "hyperlipidemia     RAÚL (obstructive sleep apnea)     Hyperlipidemia LDL goal <130     Non morbid obesity due to excess calories     Past Medical History:   Diagnosis Date     Diverticulitis      Hematuria      Past Surgical History:   Procedure Laterality Date     JOINT REPLACEMTN, KNEE RT/LT  2009    Joint Replacement knee /LT     SURGICAL HISTORY OF -   early 1990's    Right knee arthroscopy for meniscus tear     SURGICAL HISTORY OF -   3/12/2004    Right rotator cuff repair     SURGICAL HISTORY OF -   9/22/2004    Cystoscopy     SURGICAL HISTORY OF -   2005    Removal of loose bodies, partial medial meniscectomy     Family History   Problem Relation Age of Onset     Hypertension Mother      Arthritis Mother      Cancer Mother         skin     Cancer Father         liver, stomach     Cancer Maternal Grandmother      Circulatory Maternal Grandfather         blood clot     Cancer Paternal Grandmother      Cerebrovascular Disease Paternal Grandfather      Genitourinary Problems Son         blastoma         Objective  /83   Ht 1.638 m (5' 4.5\")   Wt 93 kg (205 lb)   BMI 34.64 kg/m         GENERAL APPEARANCE: healthy, alert and no distress   GAIT: NORMAL  SKIN: no suspicious lesions or rashes  HEENT: Sclera clear, anicteric  CV: good peripheral pulses  RESP: Breathing not labored  NEURO: Normal strength and tone, mentation intact and speech normal  PSYCH:  mentation appears normal and affect normal/bright    Bilateral Shoulder exam    Inspection and Posture:       rounded shoulders and upper back    Skin:        no visible deformities    Tender:        subacromial space bilateral    Non Tender:       remainder of shoulder bilateral    ROM:        forward flexion 160  bilateral       abduction 160 bilateral       internal rotation gluteal region bilateral       external rotation 35 bilateral    Painful motions:       end range flexion and elevation bilateral    Strength:        abduction 3/5 right       flexion " 3/5 right       internal rotation 5/5 bilateral       external rotation 5/5 bilateral    Impingement testing:       neg (-) Neer bilateral       neg (-) Rangel bilateral    Sensation:        normal sensation over shoulder and upper extremity       Radiology  I visualized and reviewed these images with the patient  Xr Shoulder Left 2 Views  Result Date: 11/11/2019  XR SHOULDER 2 VIEW RIGHT, XR SHOULDER 2 VIEW LEFT 11/11/2019 5:04 PM HISTORY: Chronic pain of both shoulders; Chronic pain of both shoulders; Chronic pain of both shoulders   IMPRESSION: Bilateral subacromial narrowing between the acromion and humeral head, of uncertain significance. This could indicate chronic rotator cuff atrophy or tear. Otherwise unremarkable shoulder radiographs. STEVE MAXWELL MD    Xr Shoulder Right 2 Views  Result Date: 11/11/2019  XR SHOULDER 2 VIEW RIGHT, XR SHOULDER 2 VIEW LEFT 11/11/2019 5:04 PM HISTORY: Chronic pain of both shoulders; Chronic pain of both shoulders; Chronic pain of both shoulders   IMPRESSION: Bilateral subacromial narrowing between the acromion and humeral head, of uncertain significance. This could indicate chronic rotator cuff atrophy or tear. Otherwise unremarkable shoulder radiographs. STEVE MAXWELL MD    Assessment:  1. Chronic pain of both shoulders    2. Tear of right rotator cuff, unspecified tear extent, unspecified whether traumatic      Given high riding humeral head, discussed likely chronic rotator cuff tear, especially on the right with significant weakness.  We discussed the following treatment options: symptom treatment, activity modification/rest, imaging, rehab and referral. Following discussion, plan:  At this point patient is most concerned with pain control understanding the likely rotator cuff tear on the right, will trial injections.  Would consider therapy or MRI pending clinical course    Plan:  - Today's Plan of Care:  Referral for US guided injecitons    -We also discussed other  future treatment options:  Referral to Physical Therapy, MRI    Follow Up: as needed    Concerning signs and symptoms were reviewed.  The patient expressed understanding of this management plan and all questions were answered at this time.    Thanks for the opportunity to participate in the care of this patient, I will keep you updated on their progress.    CC: Shraddha Espinal MD [facility]  Primary Care Sports Medicine  [facility]    Again, thank you for allowing me to participate in the care of your patient.        Sincerely,        Marbella Espinal MD

## 2019-11-15 NOTE — PATIENT INSTRUCTIONS
Plan:  - Today's Plan of Care:  Referral for US guided injecitons    -We also discussed other future treatment options:  Referral to Physical Therapy, MRI    Follow Up: as needed    If you have any further questions for your physician or physician s care team you can call [phone] and use option 3 to leave a voice message. Calls received during business hours will be returned same day.

## 2019-11-15 NOTE — PROGRESS NOTES
Sports Medicine Clinic Visit    PCP: Shraddha Loyd Maurice is a 59 year old male who is seen  in consultation at the request of  Shraddha Loyd C.N.P. presenting with bilateral shoulder pain.    Injury: He reports bilateral shoulder pain for 3 months. He reports the majority of his pain is in the anterior shoulder. He reports difficulty lifting his arm over head. He reports no injury. He has tingling in his right arm occasionally. He has chronic neck pain.    Location of Pain: bilateral anterior shoulder  Duration of Pain: 3 month(s)  Rating of Pain at worst: 7/10  Rating of Pain Currently: 2/10  Symptoms are better with: Rest  Symptoms are worse with: overhead motions and reaching  Additional Features:   Positive: paresthesias and weakness   Negative: swelling, bruising, popping, grinding, catching, locking, instability and numbness  Other evaluation and/or treatments so far consists of: Nothing  Prior History of related problems: right shoulder RTC repair in 2012    Social History: desk work    Review of Systems  Skin: no bruising, no swelling  Musculoskeletal: as above  Neurologic: no numbness, paresthesias  Remainder of review of systems is negative including constitutional, CV, pulmonary, GI, except as noted in HPI or medical history.    Patient's current problem list, past medical and surgical history, and family history were reviewed.    Patient Active Problem List   Diagnosis     Diverticulosis of large intestine     DJD (degenerative joint disease) of knee     Rhinitis medicamentosa     Mild major depression (H)     Essential hypertension     Status post left partial knee replacement     Osteoarthritis of multiple joints     Mixed hyperlipidemia     RAÚL (obstructive sleep apnea)     Hyperlipidemia LDL goal <130     Non morbid obesity due to excess calories     Past Medical History:   Diagnosis Date     Diverticulitis      Hematuria      Past Surgical History:   Procedure Laterality Date      "JOINT REPLACEMTN, KNEE RT/LT  2009    Joint Replacement knee /LT     SURGICAL HISTORY OF -   early 1990's    Right knee arthroscopy for meniscus tear     SURGICAL HISTORY OF -   3/12/2004    Right rotator cuff repair     SURGICAL HISTORY OF -   9/22/2004    Cystoscopy     SURGICAL HISTORY OF -   2005    Removal of loose bodies, partial medial meniscectomy     Family History   Problem Relation Age of Onset     Hypertension Mother      Arthritis Mother      Cancer Mother         skin     Cancer Father         liver, stomach     Cancer Maternal Grandmother      Circulatory Maternal Grandfather         blood clot     Cancer Paternal Grandmother      Cerebrovascular Disease Paternal Grandfather      Genitourinary Problems Son         blastoma         Objective  /83   Ht 1.638 m (5' 4.5\")   Wt 93 kg (205 lb)   BMI 34.64 kg/m        GENERAL APPEARANCE: healthy, alert and no distress   GAIT: NORMAL  SKIN: no suspicious lesions or rashes  HEENT: Sclera clear, anicteric  CV: good peripheral pulses  RESP: Breathing not labored  NEURO: Normal strength and tone, mentation intact and speech normal  PSYCH:  mentation appears normal and affect normal/bright    Bilateral Shoulder exam    Inspection and Posture:       rounded shoulders and upper back    Skin:        no visible deformities    Tender:        subacromial space bilateral    Non Tender:       remainder of shoulder bilateral    ROM:        forward flexion 160  bilateral       abduction 160 bilateral       internal rotation gluteal region bilateral       external rotation 35 bilateral    Painful motions:       end range flexion and elevation bilateral    Strength:        abduction 3/5 right       flexion 3/5 right       internal rotation 5/5 bilateral       external rotation 5/5 bilateral    Impingement testing:       neg (-) Neer bilateral       neg (-) Rangel bilateral    Sensation:        normal sensation over shoulder and upper extremity       Radiology  I " visualized and reviewed these images with the patient  Xr Shoulder Left 2 Views  Result Date: 11/11/2019  XR SHOULDER 2 VIEW RIGHT, XR SHOULDER 2 VIEW LEFT 11/11/2019 5:04 PM HISTORY: Chronic pain of both shoulders; Chronic pain of both shoulders; Chronic pain of both shoulders   IMPRESSION: Bilateral subacromial narrowing between the acromion and humeral head, of uncertain significance. This could indicate chronic rotator cuff atrophy or tear. Otherwise unremarkable shoulder radiographs. STEVE MAXWELL MD    Xr Shoulder Right 2 Views  Result Date: 11/11/2019  XR SHOULDER 2 VIEW RIGHT, XR SHOULDER 2 VIEW LEFT 11/11/2019 5:04 PM HISTORY: Chronic pain of both shoulders; Chronic pain of both shoulders; Chronic pain of both shoulders   IMPRESSION: Bilateral subacromial narrowing between the acromion and humeral head, of uncertain significance. This could indicate chronic rotator cuff atrophy or tear. Otherwise unremarkable shoulder radiographs. STEVE MAXWELL MD    Assessment:  1. Chronic pain of both shoulders    2. Tear of right rotator cuff, unspecified tear extent, unspecified whether traumatic      Given high riding humeral head, discussed likely chronic rotator cuff tear, especially on the right with significant weakness.  We discussed the following treatment options: symptom treatment, activity modification/rest, imaging, rehab and referral. Following discussion, plan:  At this point patient is most concerned with pain control understanding the likely rotator cuff tear on the right, will trial injections.  Would consider therapy or MRI pending clinical course    Plan:  - Today's Plan of Care:  Referral for US guided injecitons    -We also discussed other future treatment options:  Referral to Physical Therapy, MRI    Follow Up: as needed    Concerning signs and symptoms were reviewed.  The patient expressed understanding of this management plan and all questions were answered at this time.    Thanks for the  opportunity to participate in the care of this patient, I will keep you updated on their progress.    CC: Shraddha Espinal MD CAQ  Primary Care Sports Medicine  [facility]

## 2019-11-22 ENCOUNTER — TRANSFERRED RECORDS (OUTPATIENT)
Dept: HEALTH INFORMATION MANAGEMENT | Facility: CLINIC | Age: 59
End: 2019-11-22

## 2019-12-05 ENCOUNTER — OFFICE VISIT (OUTPATIENT)
Dept: ORTHOPEDICS | Facility: CLINIC | Age: 59
End: 2019-12-05
Payer: COMMERCIAL

## 2019-12-05 VITALS
HEIGHT: 65 IN | SYSTOLIC BLOOD PRESSURE: 135 MMHG | WEIGHT: 205 LBS | DIASTOLIC BLOOD PRESSURE: 84 MMHG | BODY MASS INDEX: 34.16 KG/M2

## 2019-12-05 DIAGNOSIS — M25.511 CHRONIC PAIN OF BOTH SHOULDERS: Primary | ICD-10-CM

## 2019-12-05 DIAGNOSIS — M25.512 CHRONIC PAIN OF BOTH SHOULDERS: Primary | ICD-10-CM

## 2019-12-05 DIAGNOSIS — G89.29 CHRONIC PAIN OF BOTH SHOULDERS: Primary | ICD-10-CM

## 2019-12-05 PROCEDURE — 20611 DRAIN/INJ JOINT/BURSA W/US: CPT | Mod: 50 | Performed by: FAMILY MEDICINE

## 2019-12-05 RX ADMIN — ROPIVACAINE HYDROCHLORIDE 3 ML: 5 INJECTION, SOLUTION EPIDURAL; INFILTRATION; PERINEURAL at 15:20

## 2019-12-05 RX ADMIN — TRIAMCINOLONE ACETONIDE 40 MG: 40 INJECTION, SUSPENSION INTRA-ARTICULAR; INTRAMUSCULAR at 15:20

## 2019-12-05 ASSESSMENT — MIFFLIN-ST. JEOR: SCORE: 1658.81

## 2019-12-05 NOTE — LETTER
2019         RE: Kwabena Richards  [address] [facility] [place] [address]        Dear Colleague,    Thank you for referring your patient, Kwabena Richards, to the [facility]. Please see a copy of my visit note below.    Kwabena Richards  :  1960  DOS: 2019  MRN: [id number]    Sports Medicine Clinic Procedure    Ultrasound Guided Bilateral Intra-Articular Shoulder Injection    Clinical History: Gradual onset of bilateral deep anterior shoulder pain over the past 3 - 4 months.    Diagnosis:   1. Chronic pain of both shoulders      Referring Physician: Marbella Espinal MD  Large Joint Injection/Arthocentesis: bilateral glenohumeral  Date/Time: 2019 3:20 PM  Performed by: Dany Bardales DO  Authorized by: Dany Bardales DO     Indications:  Pain  Needle Size:  21 G  Guidance: ultrasound    Approach:  Posterolateral  Location:  Shoulder  Laterality:  Bilateral      Site:  Bilateral glenohumeral  Medications (Right):  40 mg triamcinolone 40 MG/ML; 3 mL ropivacaine 5 MG/ML  Medications (Left):  40 mg triamcinolone 40 MG/ML; 3 mL ropivacaine 5 MG/ML  Outcome:  Tolerated well, no immediate complications  Procedure discussed: discussed risks, benefits, and alternatives    Consent Given by:  Patient  Timeout: timeout called immediately prior to procedure    Prep: patient was prepped and draped in usual sterile fashion        Impression:  Successful Bilateral intra-articular shoulder injection.    Plan:  Follow up with Dr Espinal as directed  Reviewed the options for subacromial versus glenohumeral joint injections today  Given that he likely has a bilateral rotator cuff tears, both injection options should ultimately reach both places  Given desire to avoid unnecessary much steroid exposure around already weakened rotator cuffs, we opted for glenohumeral joint injections today  Could consider alternative approach in the future based on clinical  progress  Expectations and goals of CSI reviewed  Often 2-3 days for steroid effect, and can take up to two weeks for maximum effect  We discussed modified progressive pain-free activity as tolerated  Do not overuse in first two weeks if feeling better due to concern for vulnerability while steroid is working  Supportive care reviewed  All questions were answered today  Contact us with additional questions or concerns  Signs and sx of concern reviewed        Dany Bardales DO, CAQ  Primary Care Sports Medicine  [facility]       Again, thank you for allowing me to participate in the care of your patient.        Sincerely,        Dany Bardales DO

## 2019-12-05 NOTE — PROGRESS NOTES
Kwabena Richards  :  1960  DOS: 2019  MRN: [id number]    Sports Medicine Clinic Procedure    Ultrasound Guided Bilateral Intra-Articular Shoulder Injection    Clinical History: Gradual onset of bilateral deep anterior shoulder pain over the past 3 - 4 months.    Diagnosis:   1. Chronic pain of both shoulders      Referring Physician: Marbella Espinal MD  Large Joint Injection/Arthocentesis: bilateral glenohumeral  Date/Time: 2019 3:20 PM  Performed by: Dany Bardales DO  Authorized by: Dany Bardales DO     Indications:  Pain  Needle Size:  21 G  Guidance: ultrasound    Approach:  Posterolateral  Location:  Shoulder  Laterality:  Bilateral      Site:  Bilateral glenohumeral  Medications (Right):  40 mg triamcinolone 40 MG/ML; 3 mL ropivacaine 5 MG/ML  Medications (Left):  40 mg triamcinolone 40 MG/ML; 3 mL ropivacaine 5 MG/ML  Outcome:  Tolerated well, no immediate complications  Procedure discussed: discussed risks, benefits, and alternatives    Consent Given by:  Patient  Timeout: timeout called immediately prior to procedure    Prep: patient was prepped and draped in usual sterile fashion        Impression:  Successful Bilateral intra-articular shoulder injection.    Plan:  Follow up with Dr Espinal as directed  Reviewed the options for subacromial versus glenohumeral joint injections today  Given that he likely has a bilateral rotator cuff tears, both injection options should ultimately reach both places  Given desire to avoid unnecessary much steroid exposure around already weakened rotator cuffs, we opted for glenohumeral joint injections today  Could consider alternative approach in the future based on clinical progress  Expectations and goals of CSI reviewed  Often 2-3 days for steroid effect, and can take up to two weeks for maximum effect  We discussed modified progressive pain-free activity as tolerated  Do not overuse in first two weeks if feeling better due to concern  for vulnerability while steroid is working  Supportive care reviewed  All questions were answered today  Contact us with additional questions or concerns  Signs and sx of concern reviewed        Dany Bardales DO, CAQ  Primary Care Sports Medicine  [facility]

## 2019-12-06 RX ORDER — ROPIVACAINE HYDROCHLORIDE 5 MG/ML
3 INJECTION, SOLUTION EPIDURAL; INFILTRATION; PERINEURAL
Status: SHIPPED | OUTPATIENT
Start: 2019-12-05

## 2019-12-06 RX ORDER — TRIAMCINOLONE ACETONIDE 40 MG/ML
40 INJECTION, SUSPENSION INTRA-ARTICULAR; INTRAMUSCULAR
Status: SHIPPED | OUTPATIENT
Start: 2019-12-05

## 2020-01-02 ENCOUNTER — OFFICE VISIT (OUTPATIENT)
Dept: FAMILY MEDICINE | Facility: CLINIC | Age: 60
End: 2020-01-02
Payer: COMMERCIAL

## 2020-01-02 VITALS
BODY MASS INDEX: 35.37 KG/M2 | WEIGHT: 207.2 LBS | HEART RATE: 80 BPM | RESPIRATION RATE: 20 BRPM | OXYGEN SATURATION: 95 % | SYSTOLIC BLOOD PRESSURE: 130 MMHG | DIASTOLIC BLOOD PRESSURE: 82 MMHG | TEMPERATURE: 99.1 F | HEIGHT: 64 IN

## 2020-01-02 DIAGNOSIS — H69.92 DYSFUNCTION OF LEFT EUSTACHIAN TUBE: Primary | ICD-10-CM

## 2020-01-02 PROCEDURE — 99213 OFFICE O/P EST LOW 20 MIN: CPT | Performed by: NURSE PRACTITIONER

## 2020-01-02 RX ORDER — FLUTICASONE PROPIONATE 50 MCG
1 SPRAY, SUSPENSION (ML) NASAL DAILY
Qty: 10 ML | Refills: 0 | Status: SHIPPED | OUTPATIENT
Start: 2020-01-02 | End: 2020-01-27

## 2020-01-02 ASSESSMENT — ANXIETY QUESTIONNAIRES
6. BECOMING EASILY ANNOYED OR IRRITABLE: NOT AT ALL
2. NOT BEING ABLE TO STOP OR CONTROL WORRYING: NOT AT ALL
3. WORRYING TOO MUCH ABOUT DIFFERENT THINGS: NOT AT ALL
7. FEELING AFRAID AS IF SOMETHING AWFUL MIGHT HAPPEN: NOT AT ALL
GAD7 TOTAL SCORE: 0
5. BEING SO RESTLESS THAT IT IS HARD TO SIT STILL: NOT AT ALL
1. FEELING NERVOUS, ANXIOUS, OR ON EDGE: NOT AT ALL
IF YOU CHECKED OFF ANY PROBLEMS ON THIS QUESTIONNAIRE, HOW DIFFICULT HAVE THESE PROBLEMS MADE IT FOR YOU TO DO YOUR WORK, TAKE CARE OF THINGS AT HOME, OR GET ALONG WITH OTHER PEOPLE: NOT DIFFICULT AT ALL

## 2020-01-02 ASSESSMENT — ENCOUNTER SYMPTOMS
COUGH: 1
NAUSEA: 0
SORE THROAT: 1
VOMITING: 0
DIARRHEA: 0
EYES NEGATIVE: 1
PSYCHIATRIC NEGATIVE: 1
SINUS PRESSURE: 1
CARDIOVASCULAR NEGATIVE: 1

## 2020-01-02 ASSESSMENT — PATIENT HEALTH QUESTIONNAIRE - PHQ9
SUM OF ALL RESPONSES TO PHQ QUESTIONS 1-9: 0
5. POOR APPETITE OR OVEREATING: NOT AT ALL

## 2020-01-02 ASSESSMENT — MIFFLIN-ST. JEOR: SCORE: 1664.82

## 2020-01-02 NOTE — PATIENT INSTRUCTIONS
1. Your eustachian tube is likely blocked and your ear is full of fluid. It does not look infected at this time.   2. You may use nasal Afrin 1-2 times per day for up to 5 days for sinus congestion. Do not use longer as it may cause rebound congestion.  3. Use Flonase daily for 1-2 weeks (may use longer as needed).   4. Use a humidifier in the bedroom, warm compresses on the face, and steam inhalation.  5. If symptoms worsen or do not improve within 1 week, please follow-up with your PCP.          Thank you for choosing [facility].  You may be receiving an email and/or telephone survey request from [facility] Customer Experience regarding your visit today.  Please take a few minutes to respond to the survey to let us know how we are doing.      If you have questions or concerns, please contact us via [employer] or you can contact your care team at [phone].    Our Clinic hours are:  Monday 6:40 am  to 7:00 pm  Tuesday -Friday 6:40 am to 5:00 pm    The [place] outpatient lab hours are:  Monday - Friday 6:10 am to 4:45 pm  Saturdays 7:00 am to 11:00 am  Appointments are required, call [phone]    If you have clinical questions after hours or would like to schedule an appointment,  call the clinic at [phone].

## 2020-01-02 NOTE — PROGRESS NOTES
Subjective     Kwabena Richards is a 59 year old male who presents to clinic today for the following health issues:      ENT Symptoms             Symptoms: cc Present Absent Comment   Fever/Chills   X    Fatigue   X    Muscle Aches   X    Eye Irritation   X    Sneezing   X    Nasal Hi/Drg   X    Sinus Pressure/Pain  X  Some sinus drainage   Loss of smell   X    Dental pain   X    Sore Throat  X  Very little    Swollen Glands   X    Ear Pain/Fullness X X  Left Ear Plugged   Cough  X  Occ. Productive cough   Wheeze   X    Chest Pain   X    Shortness of breath   X    Rash   X    Other         Symptom duration:  x 4-5 Days    Symptom severity:  moderate    Treatments tried:  none    Contacts: Does have rx for Amoxicillin at home due to knee replacement took 4 tabs on Monday            Patient Active Problem List   Diagnosis     Diverticulosis of large intestine     DJD (degenerative joint disease) of knee     Rhinitis medicamentosa     Mild major depression (H)     Essential hypertension     Status post left partial knee replacement     Osteoarthritis of multiple joints     Mixed hyperlipidemia     RAÚL (obstructive sleep apnea)     Hyperlipidemia LDL goal <130     Non morbid obesity due to excess calories     Past Surgical History:   Procedure Laterality Date     JOINT REPLACEMTN, KNEE RT/LT  2009    Joint Replacement knee /LT     SURGICAL HISTORY OF -   early     Right knee arthroscopy for meniscus tear     SURGICAL HISTORY OF -   3/12/2004    Right rotator cuff repair     SURGICAL HISTORY OF -   2004    Cystoscopy     SURGICAL HISTORY OF -       Removal of loose bodies, partial medial meniscectomy       Social History     Tobacco Use     Smoking status: Former Smoker     Last attempt to quit: 1995     Years since quittin.5     Smokeless tobacco: Never Used   Substance Use Topics     Alcohol use: Yes     Alcohol/week: 4.0 standard drinks     Types: 4 Standard drinks or equivalent per week      Comment: drinks once a week, only on Friday     Family History   Problem Relation Age of Onset     Hypertension Mother      Arthritis Mother      Cancer Mother         skin     Cancer Father         liver, stomach     Cancer Maternal Grandmother      Circulatory Maternal Grandfather         blood clot     Cancer Paternal Grandmother      Cerebrovascular Disease Paternal Grandfather      Genitourinary Problems Son         blastoma         Current Outpatient Medications   Medication Sig Dispense Refill     ASPIRIN 81 MG OR CHEW (ON HOLD WHILE ON COUMADIN)1 TABLET BY MOUTH DAILY       buPROPion (WELLBUTRIN XL) 150 MG 24 hr tablet Take 1 tablet (150 mg) by mouth every morning 90 tablet 3     celecoxib (CELEBREX) 100 MG capsule TAKE 2 CAPSULES BY MOUTH EVERY  capsule 3     Cholecalciferol (VITAMIN D PO) Take 1,000 Int'l Units by mouth daily        Coenzyme Q10 (CO Q 10) 100 MG CAPS Take 1 capsule by mouth daily       Diphenhydramine-APAP, sleep, (TYLENOL PM EXTRA STRENGTH PO) Take  by mouth as needed.       DULoxetine (CYMBALTA) 60 MG capsule Take 1 capsule (60 mg) by mouth daily 90 capsule 3     fish oil-omega-3 fatty acids (EQL FISH OIL) 1000 MG capsule Take 3 capsules by mouth daily. 180 capsule 12     fluticasone (FLONASE) 50 MCG/ACT nasal spray Spray 1-2 sprays into both nostrils daily 3 Package 0     Garlic 2 MG CAPS Take 1 capsule by mouth daily       GLUCOSAMINE CHONDROITIN COMPLX PO 1-2 tablets daily       latanoprost (XALATAN) 0.005 % ophthalmic solution Place 1 drop into both eyes daily        losartan (COZAAR) 100 MG tablet Take 1 tablet (100 mg) by mouth daily 90 tablet 3     MULTIPLE VITAMIN OR 1 TABLET ORALLY DAILY       ORDER FOR [facility] Respironics Remstar 60 series auto CPAP 5-15 cm H2O       vitamin  B complex with vitamin C (VITAMIN  B COMPLEX) TABS Take 1 tablet by mouth daily       ZINC OR 1 TABLET ORALLY DAILY       Allergies   Allergen Reactions     Dilaudid [Hydromorphone Hcl] Itching      "Lisinopril      Cough       BP Readings from Last 3 Encounters:   01/02/20 130/82   12/05/19 135/84   11/15/19 131/83    Wt Readings from Last 3 Encounters:   01/02/20 94 kg (207 lb 3.2 oz)   12/05/19 93 kg (205 lb)   11/15/19 93 kg (205 lb)              Reviewed and updated as needed this visit by Provider  Amanda Crenshaw DNP, NP-C 1/2/2020 4:38 PM            Review of Systems   HENT: Positive for hearing loss (left ear \"clogged\"), postnasal drip, sinus pressure (mild) and sore throat (mild). Negative for ear discharge and ear pain.    Eyes: Negative.    Respiratory: Positive for cough (mild).    Cardiovascular: Negative.    Gastrointestinal: Negative for diarrhea, nausea and vomiting.   Psychiatric/Behavioral: Negative.             Objective    /82 (BP Location: Right arm, Patient Position: Chair, Cuff Size: Adult Large)   Pulse 80   Temp 99.1  F (37.3  C) (Tympanic)   Resp 20   Ht 1.632 m (5' 4.25\")   Wt 94 kg (207 lb 3.2 oz)   SpO2 95%   BMI 35.29 kg/m    Body mass index is 35.29 kg/m .  Physical Exam  Constitutional:       Appearance: Normal appearance.   HENT:      Head: Normocephalic and atraumatic.      Ears:      Comments: Right TM: clear with clear fluid behind it; left TM: bulging with clear fluid behind and slight peripheral edema     Nose: Nose normal.      Mouth/Throat:      Mouth: Mucous membranes are moist.      Pharynx: Posterior oropharyngeal erythema (mild posterior and roof of mouth) present.   Eyes:      Conjunctiva/sclera: Conjunctivae normal.   Neck:      Musculoskeletal: Normal range of motion and neck supple.   Cardiovascular:      Rate and Rhythm: Normal rate and regular rhythm.      Pulses: Normal pulses.      Heart sounds: Normal heart sounds.   Pulmonary:      Effort: Pulmonary effort is normal.      Breath sounds: Normal breath sounds.   Musculoskeletal: Normal range of motion.   Skin:     General: Skin is warm and dry.   Neurological:      Mental Status: He is alert and " oriented to person, place, and time.   Psychiatric:         Mood and Affect: Mood normal.         Behavior: Behavior normal.                    Assessment & Plan     1. Dysfunction of left eustachian tube  Not infectious at this time. Eustachian tube likely blocked. Start taking Flonase daily for 1-2 weeks or until symptoms resolve (script sent per patient request, will buy OTC if insurance doesn't cover). May take afrin 1-2x/day. Patient not likely to take Afrin due to history of chronic use. Recommended home remedies: fluids, humidification, steam inhalation to help fluid drainage. Discussed s/s of infection and when to return.    - fluticasone (FLONASE) 50 MCG/ACT nasal spray; Spray 1 spray into both nostrils daily  Dispense: 10 mL; Refill: 0       Return in about 1 week (around 1/9/2020), or if symptoms worsen or fail to improve.    STU Carver [facility]

## 2020-01-03 ASSESSMENT — ANXIETY QUESTIONNAIRES: GAD7 TOTAL SCORE: 0

## 2020-01-24 DIAGNOSIS — H69.92 DYSFUNCTION OF LEFT EUSTACHIAN TUBE: ICD-10-CM

## 2020-01-27 RX ORDER — FLUTICASONE PROPIONATE 50 MCG
SPRAY, SUSPENSION (ML) NASAL
Qty: 16 ML | Refills: 3 | Status: SHIPPED | OUTPATIENT
Start: 2020-01-27 | End: 2020-09-14

## 2020-01-27 NOTE — TELEPHONE ENCOUNTER
"Requested Prescriptions   Pending Prescriptions Disp Refills     fluticasone (FLONASE) 50 MCG/ACT nasal spray [Pharmacy Med Name: FLUTICASONE PROP 50 MCG SPRAY] 16 mL 0     Sig: INSTILL 1 SPRAY INTO BOTH NOSTRILS DAILY       Inhaled Steroids Protocol Passed - 1/24/2020  9:36 AM        Passed - Patient is age 12 or older        Passed - Recent (12 mo) or future (30 days) visit within the authorizing provider's specialty     Patient has had an office visit with the authorizing provider or a provider within the authorizing providers department within the previous 12 mos or has a future within next 30 days. See \"Patient Info\" tab in inbasket, or \"Choose Columns\" in Meds & Orders section of the refill encounter.              Passed - Medication is active on med list        Last Written Prescription Date:  1/2/20  Last Fill Quantity: 10,  # refills: 0   Last office visit: 1/2/2020 with prescribing provider:  Den   Future Office Visit:      "
Prescription approved per [facility] Refill Protocol.  
Statement Selected

## 2020-03-27 DIAGNOSIS — I10 ESSENTIAL HYPERTENSION: ICD-10-CM

## 2020-03-27 RX ORDER — LOSARTAN POTASSIUM 100 MG/1
TABLET ORAL
Qty: 90 TABLET | Refills: 3 | Status: SHIPPED | OUTPATIENT
Start: 2020-03-27 | End: 2021-03-26

## 2020-04-10 DIAGNOSIS — F33.0 MAJOR DEPRESSIVE DISORDER, RECURRENT EPISODE, MILD (H): ICD-10-CM

## 2020-04-10 RX ORDER — DULOXETIN HYDROCHLORIDE 60 MG/1
CAPSULE, DELAYED RELEASE ORAL
Qty: 90 CAPSULE | Refills: 1 | Status: SHIPPED | OUTPATIENT
Start: 2020-04-10 | End: 2020-09-30

## 2020-04-10 NOTE — TELEPHONE ENCOUNTER
"Requested Prescriptions   Pending Prescriptions Disp Refills     DULoxetine (CYMBALTA) 60 MG capsule [Pharmacy Med Name: DULOXETINE HCL DR 60 MG CAP] 90 capsule 3     Sig: TAKE 1 CAPSULE BY MOUTH EVERY DAY       Serotonin-Norepinephrine Reuptake Inhibitors  Passed - 4/10/2020 12:28 AM        Passed - Blood pressure under 140/90 in past 12 months     BP Readings from Last 3 Encounters:   01/02/20 130/82   12/05/19 135/84   11/15/19 131/83                 Passed - PHQ-9 score of less than 5 in past 6 months     Please review last PHQ-9 score.           Passed - Medication is active on med list        Passed - Patient is age 18 or older        Passed - Recent (6 mo) or future (30 days) visit within the authorizing provider's specialty     Patient had office visit in the last 6 months or has a visit in the next 30 days with authorizing provider or within the authorizing provider's specialty.  See \"Patient Info\" tab in inbasket, or \"Choose Columns\" in Meds & Orders section of the refill encounter.                 "

## 2020-04-26 DIAGNOSIS — F33.0 MAJOR DEPRESSIVE DISORDER, RECURRENT EPISODE, MILD (H): ICD-10-CM

## 2020-04-27 RX ORDER — BUPROPION HYDROCHLORIDE 150 MG/1
150 TABLET ORAL EVERY MORNING
Qty: 30 TABLET | Refills: 0 | Status: SHIPPED | OUTPATIENT
Start: 2020-04-27 | End: 2020-04-30

## 2020-04-27 NOTE — TELEPHONE ENCOUNTER
Refilled x1 months.  Set patient up for a video visit for follow up - I have availability today.  Shraddha Loyd, CNP

## 2020-04-30 ENCOUNTER — VIRTUAL VISIT (OUTPATIENT)
Dept: FAMILY MEDICINE | Facility: CLINIC | Age: 60
End: 2020-04-30
Payer: COMMERCIAL

## 2020-04-30 VITALS — WEIGHT: 205 LBS | BODY MASS INDEX: 34.91 KG/M2

## 2020-04-30 DIAGNOSIS — F33.0 MAJOR DEPRESSIVE DISORDER, RECURRENT EPISODE, MILD (H): Primary | ICD-10-CM

## 2020-04-30 DIAGNOSIS — Z12.11 SPECIAL SCREENING FOR MALIGNANT NEOPLASMS, COLON: ICD-10-CM

## 2020-04-30 PROCEDURE — 96127 BRIEF EMOTIONAL/BEHAV ASSMT: CPT | Performed by: NURSE PRACTITIONER

## 2020-04-30 PROCEDURE — 99213 OFFICE O/P EST LOW 20 MIN: CPT | Mod: TEL | Performed by: NURSE PRACTITIONER

## 2020-04-30 RX ORDER — BUPROPION HYDROCHLORIDE 150 MG/1
150 TABLET ORAL EVERY MORNING
Qty: 90 TABLET | Refills: 3 | Status: SHIPPED | OUTPATIENT
Start: 2020-04-30 | End: 2021-03-29 | Stop reason: DRUGHIGH

## 2020-04-30 ASSESSMENT — PATIENT HEALTH QUESTIONNAIRE - PHQ9: SUM OF ALL RESPONSES TO PHQ QUESTIONS 1-9: 0

## 2020-04-30 NOTE — PATIENT INSTRUCTIONS
Thank you for choosing [facility].  You may be receiving an email and/or telephone survey request from [facility] Customer Experience regarding your visit today.  Please take a few minutes to respond to the survey to let us know how we are doing.      If you have questions or concerns, please contact us via [employer] or you can contact your care team at [phone].    Our Clinic hours are:  Monday 6:40 am  to 7:00 pm  Tuesday -Friday 6:40 am to 5:00 pm    The [place] outpatient lab hours are:  Monday - Friday 6:10 am to 4:45 pm  Saturdays 7:00 am to 11:00 am  Appointments are required, call [phone]    If you have clinical questions after hours or would like to schedule an appointment,  call the clinic at [phone].

## 2020-04-30 NOTE — PROGRESS NOTES
"Kwabena Richards is a 59 year old male who is being evaluated via a billable telephone visit.      The patient has been notified of following:     \"This telephone visit will be conducted via a call between you and your physician/provider. We have found that certain health care needs can be provided without the need for a physical exam.  This service lets us provide the care you need with a short phone conversation.  If a prescription is necessary we can send it directly to your pharmacy.  If lab work is needed we can place an order for that and you can then stop by our lab to have the test done at a later time.    Telephone visits are billed at different rates depending on your insurance coverage. During this emergency period, for some insurers they may be billed the same as an in-person visit.  Please reach out to your insurance provider with any questions.    If during the course of the call the physician/provider feels a telephone visit is not appropriate, you will not be charged for this service.\"    Patient has given verbal consent for Telephone visit?  Yes    How would you like to obtain your AVS? Mail a copy    Subjective     Kwabena Richards is a 59 year old male who presents to clinic today for the following health issues:    HPI  Depression Followup    How are you doing with your depression since your last visit? Improved/stable    Are you having other symptoms that might be associated with depression? No    Have you had a significant life event?  No     Are you feeling anxious or having panic attacks?   No    Do you have any concerns with your use of alcohol or other drugs? No     Wellbutrin working well for him - he wants to continue without change.    Social History     Tobacco Use     Smoking status: Former Smoker     Last attempt to quit: 1995     Years since quittin.8     Smokeless tobacco: Never Used   Substance Use Topics     Alcohol use: Yes     Alcohol/week: 4.0 standard drinks     Types: 4 " Standard drinks or equivalent per week     Comment: drinks once a week, only on Friday     Drug use: No     PHQ 4/15/2019 1/2/2020 4/30/2020   PHQ-9 Total Score 0 0 0   Q9: Thoughts of better off dead/self-harm past 2 weeks Not at all Not at all Not at all     VISHNU-7 SCORE 3/27/2018 4/15/2019 1/2/2020   Total Score - - -   Total Score 0 0 0           Suicide Assessment Five-step Evaluation and Treatment (SAFE-T)      How many servings of fruits and vegetables do you eat daily?  2-3    On average, how many sweetened beverages do you drink each day (Examples: soda, juice, sweet tea, etc.  Do NOT count diet or artificially sweetened beverages)?   v8 fruit juice  -1    How many days per week do you exercise enough to make your heart beat faster?  Not enough/ very little    How many minutes a day do you exercise enough to make your heart beat faster?     How many days per week do you miss taking your medication? 0                 Reviewed and updated as needed this visit by Provider  Tobacco  Allergies  Meds  Problems  Med Hx  Surg Hx  Fam Hx         Review of Systems   ROS COMP: Constitutional, HEENT, cardiovascular, pulmonary, gi and gu systems are negative, except as otherwise noted.       Objective   Reported vitals:  Wt 93 kg (205 lb)   BMI 34.91 kg/m     PSYCH: Alert and oriented times 3; coherent speech, normal   rate and volume, able to articulate logical thoughts, able   to abstract reason, no tangential thoughts, no hallucinations   or delusions  RESP: No cough, no audible wheezing, able to talk in full sentences  Remainder of exam unable to be completed due to telephone visits            Assessment/Plan:  1. Major depressive disorder, recurrent episode, mild (H)  Well controlled  - buPROPion (WELLBUTRIN XL) 150 MG 24 hr tablet; Take 1 tablet (150 mg) by mouth every morning  Dispense: 90 tablet; Refill: 3    2. Special screening for malignant neoplasms, colon  - Fecal colorectal cancer screen (FIT);  Future    Return in about 6 months (around 10/30/2020).      Phone call duration:  7 minutes    The risks, benefits and treatment options of prescribed medications or other treatments have been discussed with the patient. The patient verbalized their understanding and should call or follow up if no improvement or if they develop further problems.    STU Evangelista CNP

## 2020-06-12 DIAGNOSIS — G47.33 OSA (OBSTRUCTIVE SLEEP APNEA): Primary | ICD-10-CM

## 2020-07-13 DIAGNOSIS — Z12.11 SPECIAL SCREENING FOR MALIGNANT NEOPLASMS, COLON: ICD-10-CM

## 2020-07-13 PROCEDURE — 82274 ASSAY TEST FOR BLOOD FECAL: CPT | Performed by: NURSE PRACTITIONER

## 2020-07-19 LAB — HEMOCCULT STL QL IA: NEGATIVE

## 2020-08-17 ENCOUNTER — HOSPITAL ENCOUNTER (EMERGENCY)
Facility: CLINIC | Age: 60
Discharge: HOME | End: 2020-08-17
Attending: FAMILY MEDICINE | Admitting: FAMILY MEDICINE
Payer: COMMERCIAL

## 2020-08-17 VITALS
HEIGHT: 65 IN | RESPIRATION RATE: 18 BRPM | TEMPERATURE: 99.9 F | OXYGEN SATURATION: 98 % | DIASTOLIC BLOOD PRESSURE: 78 MMHG | WEIGHT: 195 LBS | BODY MASS INDEX: 32.49 KG/M2 | SYSTOLIC BLOOD PRESSURE: 121 MMHG | HEART RATE: 102 BPM

## 2020-08-17 DIAGNOSIS — S61.012A LACERATION OF LEFT THUMB WITHOUT FOREIGN BODY WITHOUT DAMAGE TO NAIL, INITIAL ENCOUNTER: ICD-10-CM

## 2020-08-17 DIAGNOSIS — S61.211A LACERATION OF LEFT INDEX FINGER WITHOUT FOREIGN BODY WITHOUT DAMAGE TO NAIL, INITIAL ENCOUNTER: ICD-10-CM

## 2020-08-17 PROCEDURE — 12002 RPR S/N/AX/GEN/TRNK2.6-7.5CM: CPT | Mod: Z6 | Performed by: FAMILY MEDICINE

## 2020-08-17 PROCEDURE — 27210282 ZZH ADHESIVE DERMABOND SKIN: Performed by: FAMILY MEDICINE

## 2020-08-17 PROCEDURE — 99282 EMERGENCY DEPT VISIT SF MDM: CPT | Performed by: FAMILY MEDICINE

## 2020-08-17 PROCEDURE — 12002 RPR S/N/AX/GEN/TRNK2.6-7.5CM: CPT | Performed by: FAMILY MEDICINE

## 2020-08-17 PROCEDURE — 99282 EMERGENCY DEPT VISIT SF MDM: CPT | Mod: 25 | Performed by: FAMILY MEDICINE

## 2020-08-17 ASSESSMENT — MIFFLIN-ST. JEOR: SCORE: 1616.39

## 2020-08-17 NOTE — DISCHARGE INSTRUCTIONS
Return to the Emergency Room if the following occurs:     Worsened pain, expanding redness and swelling, fever, or for any concern at anytime.    Or, follow-up with the following provider as we discussed:     Return to your primary doctor as needed.    Medications discussed:    None new.  No changes.    If you received pain-relieving or sedating medication during your time in the ER, avoid alcohol, driving automobiles, or working with machinery.  Also, a responsible adult must stay with you.      If you had X-rays or labs done we will attempt to contact you if there is a change needed in your care.      Call the Nurse Advice Line at [phone] or [phone] for any concern at anytime.

## 2020-08-17 NOTE — ED PROVIDER NOTES
HPI   The patient is a 60-year-old male presenting with an injury to his left first and second fingers.  This occurred just prior to arrival.  The patient was using a utility knife to shave down some wood when it slipped and hit him in the finger and thumb.  There was rapid bleeding at home.  It has slowed as he arrived here.  He denies loss of extension or flexion.  No foreign body appreciated.  No other injury.  Pain is mild to moderate.        Allergies:  Allergies   Allergen Reactions     Dilaudid [Hydromorphone Hcl] Itching     Lisinopril      Cough       Problem List:    Patient Active Problem List    Diagnosis Date Noted     Mild major depression (H) 12/28/2011     Priority: High     Better on cymbalta        DJD (degenerative joint disease) of knee 08/07/2009     Priority: High     Non morbid obesity due to excess calories 08/29/2016     Priority: Medium     Hyperlipidemia LDL goal <130 05/13/2014     Priority: Medium     RAÚL (obstructive sleep apnea) 12/05/2013     Priority: Medium     Severe RAÚL with possible sustained hypoxemia and/or REM hypoventilation   - 12/4/2013 with AHI 36.7, je desat 75%, period at end of study suspicious for REM with significant increase in obstructive events and sustained hypoxemia.  Basal SpO2 also low-normal at ~90%.       Mixed hyperlipidemia 11/11/2013     Priority: Medium     Status post left partial knee replacement 10/16/2013     Priority: Medium     2009         Osteoarthritis of multiple joints 10/16/2013     Priority: Medium     Essential hypertension 12/28/2011     Priority: Medium     Rhinitis medicamentosa 06/28/2010     Priority: Medium     Diverticulosis of large intestine 02/10/2005     Priority: Medium     Colonoscopy 10/04  No polyps    Problem list name updated by automated process. Provider to review        Past Medical History:    Past Medical History:   Diagnosis Date     Diverticulitis      Hematuria      Past Surgical History:    Past Surgical  History:   Procedure Laterality Date     JOINT REPLACEMTN, KNEE RT/LT  2009    Joint Replacement knee /LT     SURGICAL HISTORY OF -   early     Right knee arthroscopy for meniscus tear     SURGICAL HISTORY OF -   3/12/2004    Right rotator cuff repair     SURGICAL HISTORY OF -   2004    Cystoscopy     SURGICAL HISTORY OF -       Removal of loose bodies, partial medial meniscectomy     Family History:    Family History   Problem Relation Age of Onset     Hypertension Mother      Arthritis Mother      Cancer Mother         skin     Cancer Father         liver, stomach     Cancer Maternal Grandmother      Circulatory Maternal Grandfather         blood clot     Cancer Paternal Grandmother      Cerebrovascular Disease Paternal Grandfather      Genitourinary Problems Son         blastoma     Social History:  Marital Status:   [2]  Social History     Tobacco Use     Smoking status: Former Smoker     Last attempt to quit: 1995     Years since quittin.1     Smokeless tobacco: Never Used   Substance Use Topics     Alcohol use: Yes     Alcohol/week: 4.0 standard drinks     Types: 4 Standard drinks or equivalent per week     Comment: drinks once a week, only on Friday     Drug use: No      Medications:    ASPIRIN 81 MG OR CHEW  buPROPion (WELLBUTRIN XL) 150 MG 24 hr tablet  celecoxib (CELEBREX) 100 MG capsule  Cholecalciferol (VITAMIN D PO)  Coenzyme Q10 (CO Q 10) 100 MG CAPS  Diphenhydramine-APAP, sleep, (TYLENOL PM EXTRA STRENGTH PO)  DULoxetine (CYMBALTA) 60 MG capsule  fish oil-omega-3 fatty acids (EQL FISH OIL) 1000 MG capsule  fluticasone (FLONASE) 50 MCG/ACT nasal spray  fluticasone (FLONASE) 50 MCG/ACT nasal spray  Garlic 2 MG CAPS  GLUCOSAMINE CHONDROITIN COMPLX PO  latanoprost (XALATAN) 0.005 % ophthalmic solution  losartan (COZAAR) 100 MG tablet  MULTIPLE VITAMIN OR  ORDER FOR DME  vitamin  B complex with vitamin C (VITAMIN  B COMPLEX) TABS  ZINC OR      Review of Systems   All other  "systems reviewed and are negative.      PE   BP: 121/78  Pulse: 102  Temp: 99.9  F (37.7  C)  Resp: 18  Height: 165.1 cm (5' 5\")  Weight: 88.5 kg (195 lb)  SpO2: 98 %  Physical Exam  Vitals signs and nursing note reviewed.   Constitutional:       General: He is not in acute distress.  HENT:      Head: Atraumatic.      Right Ear: External ear normal.      Left Ear: External ear normal.      Nose: Nose normal.      Mouth/Throat:      Mouth: Mucous membranes are moist.      Pharynx: Oropharynx is clear.   Eyes:      General: No scleral icterus.     Extraocular Movements: Extraocular movements intact.      Conjunctiva/sclera: Conjunctivae normal.      Pupils: Pupils are equal, round, and reactive to light.   Neck:      Musculoskeletal: Normal range of motion.   Cardiovascular:      Rate and Rhythm: Normal rate.   Pulmonary:      Effort: Pulmonary effort is normal. No respiratory distress.   Musculoskeletal: Normal range of motion.   Skin:     General: Skin is warm and dry.      Comments: There is a 1.5 cm curvilinear laceration at the second distal phalanx, left hand.  This is not bleeding.  It is not gaping.  No foreign body.  Wound margins are approximated naturally.  He is able to bend and extend his finger without difficulty.  There is a 1.5 cm curvilinear laceration at the first distal phalanx, left hand.  This is not bleeding until I tried to open the wound.  No foreign body.  Wound margins are approximated naturally.  He is able to bend and extend his thumb without difficulty.   Neurological:      Mental Status: He is alert and oriented to person, place, and time.   Psychiatric:         Behavior: Behavior normal.         ED COURSE and [facility]   1812.  The patient has lacerations involving his left second and first digit.  These are closed with glue.  No evidence for contamination or foreign body.  The wounds were bleeding just prior to arrival.  No antibiotics at this time.    LABS  Labs Ordered and Resulted from " Time of ED Arrival Up to the Time of Departure from the ED - No data to display    IMAGING  Images reviewed by me.  Radiology report also reviewed.  No orders to display       Procedures    Medications - No data to display      IMPRESSION       ICD-10-CM    1. Laceration of left index finger without foreign body without damage to nail, initial encounter  S61.211A    2. Laceration of left thumb without foreign body without damage to nail, initial encounter  S61.012A             Medication List      There are no discharge medications for this visit.                       Brady Daniel MD  08/17/20 1816

## 2020-08-26 DIAGNOSIS — M17.0 PRIMARY OSTEOARTHRITIS OF BOTH KNEES: ICD-10-CM

## 2020-08-26 RX ORDER — CELECOXIB 100 MG/1
CAPSULE ORAL
Qty: 180 CAPSULE | Refills: 0 | Status: SHIPPED | OUTPATIENT
Start: 2020-08-26 | End: 2020-12-22

## 2020-08-26 NOTE — LETTER
[facility]  [address] [address]  Phone: [phone]       August 26, 2020         Kwabena Richards  [address] [facility] [address]            Dear Kwabena:    We are concerned about your health care.  We recently provided you with medication refills.  Many medications require routine follow-up with your doctor.    Your prescription(s) have been refilled for 3 months so you may have time for the above noted follow-up. Please call to schedule soon so we can assure you have an appointment before your next refills are needed.    Thank you,      ARCHANA Evangelista / Kristal ESCAMILLA RN

## 2020-08-26 NOTE — TELEPHONE ENCOUNTER
Refilled for 3 months.    He will then be due for a clinic appointment and labs.  Call and notify patient.  Shraddha Loyd CNP

## 2020-08-26 NOTE — TELEPHONE ENCOUNTER
"Requested Prescriptions   Pending Prescriptions Disp Refills     celecoxib (CELEBREX) 100 MG capsule [Pharmacy Med Name: CELECOXIB 100 MG CAPSULE] 180 capsule 3     Sig: TAKE 2 CAPSULES BY MOUTH EVERY DAY       NSAID Medications Failed - 8/26/2020 12:37 AM        Failed - Normal ALT on file in past 12 months     Recent Labs   Lab Test 08/29/16  1639   ALT 28             Failed - Normal AST on file in past 12 months     Recent Labs   Lab Test 08/29/16  1639   AST 18             Failed - Normal CBC on file in past 12 months     No lab results found.              Failed - Normal serum creatinine on file in past 12 months     Recent Labs   Lab Test 04/15/19  0812   CR 0.83       Ok to refill medication if creatinine is low          Passed - Blood pressure under 140/90 in past 12 months     BP Readings from Last 3 Encounters:   08/17/20 121/78   01/02/20 130/82   12/05/19 135/84                 Passed - Recent (12 mo) or future (30 days) visit within the authorizing provider's specialty     Patient has had an office visit with the authorizing provider or a provider within the authorizing providers department within the previous 12 mos or has a future within next 30 days. See \"Patient Info\" tab in inbasket, or \"Choose Columns\" in Meds & Orders section of the refill encounter.              Passed - Patient is age 6-64 years        Passed - Medication is active on med list             "

## 2020-09-13 DIAGNOSIS — H69.92 DYSFUNCTION OF LEFT EUSTACHIAN TUBE: ICD-10-CM

## 2020-09-14 RX ORDER — FLUTICASONE PROPIONATE 50 MCG
SPRAY, SUSPENSION (ML) NASAL
Qty: 16 ML | Refills: 3 | Status: SHIPPED | OUTPATIENT
Start: 2020-09-14 | End: 2021-11-11

## 2020-09-14 NOTE — TELEPHONE ENCOUNTER
Routing refill request to provider for review/approval because: Last ordered by float provider    Karo DONG RN, BSN

## 2020-09-14 NOTE — TELEPHONE ENCOUNTER
"Requested Prescriptions   Pending Prescriptions Disp Refills     fluticasone (FLONASE) 50 MCG/ACT nasal spray [Pharmacy Med Name: FLUTICASONE PROP 50 MCG SPRAY] 16 mL 3     Sig: INSTILL 1 SPRAY INTO BOTH NOSTRILS DAILY       Nasal Allergy Protocol Passed - 9/13/2020 11:06 AM        Passed - Patient is age 12 or older        Passed - Recent (12 mo) or future (30 days) visit within the authorizing provider's specialty     Patient has had an office visit with the authorizing provider or a provider within the authorizing providers department within the previous 12 mos or has a future within next 30 days. See \"Patient Info\" tab in inbasket, or \"Choose Columns\" in Meds & Orders section of the refill encounter.              Passed - Medication is active on med list             "

## 2020-09-29 DIAGNOSIS — F33.0 MAJOR DEPRESSIVE DISORDER, RECURRENT EPISODE, MILD (H): ICD-10-CM

## 2020-09-29 NOTE — TELEPHONE ENCOUNTER
"Requested Prescriptions   Pending Prescriptions Disp Refills     DULoxetine (CYMBALTA) 60 MG capsule [Pharmacy Med Name: DULOXETINE HCL DR 60 MG CAP] 90 capsule 1     Sig: TAKE 1 CAPSULE BY MOUTH EVERY DAY       Serotonin-Norepinephrine Reuptake Inhibitors  Passed - 9/29/2020 12:36 AM        Passed - Blood pressure under 140/90 in past 12 months     BP Readings from Last 3 Encounters:   08/17/20 121/78   01/02/20 130/82   12/05/19 135/84                 Passed - PHQ-9 score of less than 5 in past 6 months     Please review last PHQ-9 score.           Passed - Medication is active on med list        Passed - Patient is age 18 or older        Passed - Recent (6 mo) or future (30 days) visit within the authorizing provider's specialty     Patient had office visit in the last 6 months or has a visit in the next 30 days with authorizing provider or within the authorizing provider's specialty.  See \"Patient Info\" tab in inbasket, or \"Choose Columns\" in Meds & Orders section of the refill encounter.                 "

## 2020-09-30 RX ORDER — DULOXETIN HYDROCHLORIDE 60 MG/1
CAPSULE, DELAYED RELEASE ORAL
Qty: 90 CAPSULE | Refills: 1 | Status: SHIPPED | OUTPATIENT
Start: 2020-09-30 | End: 2021-03-22

## 2020-11-20 ENCOUNTER — DOCUMENTATION ONLY (OUTPATIENT)
Dept: SLEEP MEDICINE | Facility: CLINIC | Age: 60
End: 2020-11-20

## 2020-12-03 ENCOUNTER — TELEPHONE (OUTPATIENT)
Dept: SLEEP MEDICINE | Facility: CLINIC | Age: 60
End: 2020-12-03

## 2020-12-03 DIAGNOSIS — G47.33 OSA (OBSTRUCTIVE SLEEP APNEA): Primary | ICD-10-CM

## 2020-12-03 NOTE — TELEPHONE ENCOUNTER
Reason for call:  Other   Patient called regarding (reason for call): call back  Additional comments: Patient had been eligible for a new cpap machine at his last visit but chose to continue with the one he had. He would now like to upgrade and needs an updated prescription to be sent to [facility].    Phone number to reach patient:  Cell number on file:    Telephone Information:   Mobile [phone]       Best Time:  any    Can we leave a detailed message on this number?  YES    Travel screening: Negative

## 2020-12-07 ENCOUNTER — TELEPHONE (OUTPATIENT)
Dept: SLEEP MEDICINE | Facility: CLINIC | Age: 60
End: 2020-12-07

## 2020-12-07 NOTE — TELEPHONE ENCOUNTER
CALLED PT TO INFORM HIM OF NEEDING FOLLOW-UP APPT TO GET REPLACEMENT MACHINE. PT STATED UNDERSTANDING.

## 2020-12-10 NOTE — PROGRESS NOTES
"Kwabena Richards is a 60 year old male who is being evaluated via a billable telephone visit.      The patient has been notified of following:     \"This telephone visit will be conducted via a call between you and your physician/provider. We have found that certain health care needs can be provided without the need for a physical exam.  This service lets us provide the care you need with a short phone conversation.  If a prescription is necessary we can send it directly to your pharmacy.  If lab work is needed we can place an order for that and you can then stop by our lab to have the test done at a later time.    Telephone visits are billed at different rates depending on your insurance coverage. During this emergency period, for some insurers they may be billed the same as an in-person visit.  Please reach out to your insurance provider with any questions.    If during the course of the call the physician/provider feels a telephone visit is not appropriate, you will not be charged for this service.\"    Patient has given verbal consent for Telephone visit?  Yes    What phone number would you like to be contacted at? [phone] alt:[phone]    How would you like to obtain your AVS? Mail a copy    Virtual visit for annual follow-up of severe obstructive sleep apnea with possible sustained hypoxemia and/or REM hypoventilation managed with CPAP.    Impression/Plan:     1.) Severe RAÚL with possible sustained hypoxemia and/or REM hypoventilation, currently treated with auto-titrate CPAP 12-17 cm H2O  - 12/4/2013 with AHI 36.7, je desat 75%, period at end of study suspicious for REM with significant increase in obstructive events and sustained hypoxemia. Basal SpO2 also low-normal at ~90%.   - Appears well controlled per download and patient on CPAP auto-titrate 12-17 cm H2O.    - Will place orders for replacement CPAP on current settings, recommend WatchPAT on CPAP to evaluate for residual " hypoxemia.    60-year-old male with past medical history of mild major depression, hyperlipidemia, hypertension.    Last office visit on April 25, 2019.  At this time, appears well controlled with CPAP auto titrate 12-17 cm H2O.  Had recommended Pap titration PSG due to possible sustained hypoxemia, the patient had declined.  Recommended performing overnight oximetry on CPAP to ensure no residual hypoxemia.    He returns today for annual follow-up in order to get an updated supply prescription and also is interested in getting a replacement CPAP.  His current CPAP is approximately 7 years old and the humidifier is no longer working.  I did not have a CPAP download to review today, but previously was well controlled on the current settings of CPAP auto titrate 12-17 cm H2O.     Clinically, he feels that his CPAP otherwise is continue to work well from a pressure standpoint and that his sleep apnea is being controlled.    Prior Sleep Testing:  HST 12/4/2013 with AHI 36.7, je desat 75%, baseline SpO2 at ~90%) treated with auto-titrate CPAP 12-17 cm H2O    Phone call duration: 20 minutes    ---  This note was written with the assistance of the Dragon voice-dictation technology software. The final document, although reviewed, may contain errors. For corrections, please contact the office.    Eleazar Prince MD    Sleep Medicine  [facility]  ([phone])  [facility]  ([phone])

## 2020-12-11 ENCOUNTER — VIRTUAL VISIT (OUTPATIENT)
Dept: SLEEP MEDICINE | Facility: CLINIC | Age: 60
End: 2020-12-11
Payer: COMMERCIAL

## 2020-12-11 DIAGNOSIS — G47.33 OSA (OBSTRUCTIVE SLEEP APNEA): Primary | ICD-10-CM

## 2020-12-11 PROCEDURE — 99213 OFFICE O/P EST LOW 20 MIN: CPT | Mod: TEL | Performed by: FAMILY MEDICINE

## 2020-12-14 NOTE — PROGRESS NOTES
AVS has been mailed to patients home address December 14, 2020          Angela DUMONT CMA Sleep Medicine

## 2020-12-22 ENCOUNTER — DOCUMENTATION ONLY (OUTPATIENT)
Dept: SLEEP MEDICINE | Facility: CLINIC | Age: 60
End: 2020-12-22

## 2020-12-22 DIAGNOSIS — M17.0 PRIMARY OSTEOARTHRITIS OF BOTH KNEES: ICD-10-CM

## 2020-12-22 RX ORDER — CELECOXIB 100 MG/1
CAPSULE ORAL
Qty: 180 CAPSULE | Refills: 0 | Status: SHIPPED | OUTPATIENT
Start: 2020-12-22 | End: 2021-03-22

## 2020-12-22 NOTE — LETTER
[facility]  [address] [address]  Phone: [phone]       December 22, 2020         Kwabena Richards  [address] [place] [address]            Dear Kwabena:    We are concerned about your health care.  We recently provided you with medication refills.  Many medications require routine follow-up with your doctor and labs.    Your prescription(s) have been refilled for 30 days so you may have time for the above noted follow-up. Please call to schedule soon so we can assure you have an appointment before your next refills are needed.    Thank you,      ARCHANA Evangelista / Kristal ESACMILLA RN

## 2020-12-23 NOTE — PROGRESS NOTES
Patient was offered choice of vendor and chose [facility].  Patient Kwabena Richards was set up at [place]  on December 23, 2020. Patient received a [employer] DreamStation Auto. Pressures were set at 12-17 cm H2O.   Patient s ramp is 7 cm H2O for 20 min and FLEX/EPR is 2.  Patient received a Resmed Mask name: [id number]  Full Face mask size Medium, heated tubing and heated humidifier.  Patient does not need to meet compliance.   Shraddha Martinez

## 2020-12-28 ENCOUNTER — DOCUMENTATION ONLY (OUTPATIENT)
Dept: SLEEP MEDICINE | Facility: CLINIC | Age: 60
End: 2020-12-28
Payer: COMMERCIAL

## 2020-12-28 NOTE — PROGRESS NOTES
3 DAY STM VISIT    Diagnostic AHI: 36.7  PSG    Patient contacted for 3 day STM visit.    Confirmed with patient at time of call- N/A Patient is still interested in STM service     Subjective measures:  Patient doing good with CPAP has no questions or concerns.    Replacement device: No  STM ordered by provider: Yes     Device type: Auto-CPAP  PAP settings from order::  CPAP min 12 cm  H20       CPAP max 17 cm  H20  Mask type:    Full Face Mask     Device settings from machine        Assessment: Nightly usage over four hours.  Action plan: Patient to have 14 day STM visit. Patient has a follow up visit scheduled:   no.     Total time spent on accessing and  interpreting remote patient PAP therapy data  10 minutes  Total time spent counseling, coaching  and reviewing PAP therapy data with patient  1 minutes  [address] no

## 2021-03-19 ENCOUNTER — TELEPHONE (OUTPATIENT)
Dept: FAMILY MEDICINE | Facility: CLINIC | Age: 61
End: 2021-03-19

## 2021-03-19 DIAGNOSIS — M17.0 PRIMARY OSTEOARTHRITIS OF BOTH KNEES: ICD-10-CM

## 2021-03-19 RX ORDER — CELECOXIB 100 MG/1
CAPSULE ORAL
Qty: 180 CAPSULE | Refills: 0 | OUTPATIENT
Start: 2021-03-19

## 2021-03-19 NOTE — TELEPHONE ENCOUNTER
"Requested Prescriptions   Pending Prescriptions Disp Refills     celecoxib (CELEBREX) 100 MG capsule [Pharmacy Med Name: CELECOXIB 100 MG CAPSULE] 180 capsule 0     Sig: TAKE 2 CAPSULES BY MOUTH EVERY DAY       NSAID Medications Failed - 3/19/2021 12:34 AM        Failed - Normal ALT on file in past 12 months     Recent Labs   Lab Test 08/29/16  1639   ALT 28             Failed - Normal AST on file in past 12 months     Recent Labs   Lab Test 08/29/16  1639   AST 18             Failed - Normal CBC on file in past 12 months     No lab results found.              Failed - Normal serum creatinine on file in past 12 months     Recent Labs   Lab Test 04/15/19  0812   CR 0.83       Ok to refill medication if creatinine is low          Passed - Blood pressure under 140/90 in past 12 months     BP Readings from Last 3 Encounters:   08/17/20 121/78   01/02/20 130/82   12/05/19 135/84                 Passed - Recent (12 mo) or future (30 days) visit within the authorizing provider's specialty     Patient has had an office visit with the authorizing provider or a provider within the authorizing providers department within the previous 12 mos or has a future within next 30 days. See \"Patient Info\" tab in inbasket, or \"Choose Columns\" in Meds & Orders section of the refill encounter.              Passed - Patient is age 6-64 years        Passed - Medication is active on med list             "

## 2021-03-19 NOTE — LETTER
[facility]  [address] [address]  Phone: [phone]        March 19, 2021      Kwabena LOPEZ Maurice                                                                                                                                [address] [facility] [address]            Dear Mr. Richards,    We recently recently received a refill request for celecoxib.  This medication requires routine follow-up with your care provider.  It cannot be refilled at this time.    You are due to be seen.    Please call [phone] to schedule an appointment for follow up.    Thank you,        Shraddha Loyd, FNP / lar        lar

## 2021-03-20 DIAGNOSIS — F33.0 MAJOR DEPRESSIVE DISORDER, RECURRENT EPISODE, MILD (H): ICD-10-CM

## 2021-03-22 RX ORDER — CELECOXIB 100 MG/1
200 CAPSULE ORAL DAILY
Qty: 60 CAPSULE | Refills: 0 | Status: SHIPPED | OUTPATIENT
Start: 2021-03-22 | End: 2021-03-29

## 2021-03-22 RX ORDER — DULOXETIN HYDROCHLORIDE 60 MG/1
CAPSULE, DELAYED RELEASE ORAL
Qty: 30 CAPSULE | Refills: 0 | Status: SHIPPED | OUTPATIENT
Start: 2021-03-22 | End: 2021-03-29

## 2021-03-22 NOTE — TELEPHONE ENCOUNTER
Medication is being filled for 1 time refill only due to:  Needs appointment and is scheduled 3/29. 30 day refill sent to pharmacy.  Elida ELAINE RN

## 2021-03-22 NOTE — TELEPHONE ENCOUNTER
Has been almost 1 year since last visit with me.  Medication refilled for 1 month.  Please call and notify patient he is due for follow-up with me.  This may be a virtual visit if he prefers.  Shraddha Loyd, CNP

## 2021-03-22 NOTE — TELEPHONE ENCOUNTER
Patient scheduled telephone visit for 03/29-- requesting refill until OV.     Coby ORTIZ  Station

## 2021-03-25 NOTE — PROGRESS NOTES
Kwabena is a 60 year old who is being evaluated via a billable telephone visit.      What phone number would you like to be contacted at? [phone]  How would you like to obtain your AVS? Mail a copy    Assessment & Plan     Major depressive disorder, recurrent episode, mild (H)  Poorly controlled.  Continue duloxetine 60 mg daily  Increase wellbutrin to 300 mg daily  Follow up in one month if no improvement.  - DULoxetine (CYMBALTA) 60 MG capsule; TAKE 1 CAPSULE BY MOUTH EVERY DAY  - buPROPion (WELLBUTRIN XL) 300 MG 24 hr tablet; Take 1 tablet (300 mg) by mouth every morning    Essential hypertension  Stable home BPs.  Will reassess next time he is in clinic.  - losartan (COZAAR) 100 MG tablet; Take 1 tablet (100 mg) by mouth daily    Primary osteoarthritis of both knees  Stable.  - celecoxib (CELEBREX) 100 MG capsule; Take 2 capsules (200 mg) by mouth daily      Return in about 6 months (around 9/29/2021).    The risks, benefits and treatment options of prescribed medications or other treatments have been discussed with the patient. The patient verbalized their understanding and should call or follow up if no improvement or if they develop further problems.    [facility]        Subjective   Kwabena is a 60 year old who presents for the following health issues     HPI     Hypertension Follow-up      Do you check your blood pressure regularly outside of the clinic? No     But this morning was 140/92    Are you following a low salt diet? No    Are your blood pressures ever more than 140 on the top number (systolic) OR more   than 90 on the bottom number (diastolic), for example 140/90?  Doesn't check    Depression Followup    How are you doing with your depression since your last visit? Worsened     Are you having other symptoms that might be associated with depression? No    Have you had a significant life event?  No     Are you feeling anxious or having panic attacks?   " No    Do you have any concerns with your use of alcohol or other drugs? No    Social History     Tobacco Use     Smoking status: Former Smoker     Quit date: 1995     Years since quittin.7     Smokeless tobacco: Never Used   Substance Use Topics     Alcohol use: Yes     Alcohol/week: 4.0 standard drinks     Types: 4 Standard drinks or equivalent per week     Comment: drinks once a week, only on Friday     Drug use: No     PHQ 2020 2020 3/29/2021   PHQ-9 Total Score 0 0 6   Q9: Thoughts of better off dead/self-harm past 2 weeks Not at all Not at all Not at all     VISHNU-7 SCORE 3/27/2018 4/15/2019 2020   Total Score - - -   Total Score 0 0 0         Suicide Assessment Five-step Evaluation and Treatment (SAFE-T)      How many servings of fruits and vegetables do you eat daily?  2-3    On average, how many sweetened beverages do you drink each day (Examples: soda, juice, sweet tea, etc.  Do NOT count diet or artificially sweetened beverages)?   0    How many days per week do you exercise enough to make your heart beat faster? none    How many minutes a day do you exercise enough to make your heart beat faster?     How many days per week do you miss taking your medication? 0      Osteoarthritis:  Taking Celebrex.  Stable.        Review of Systems   Constitutional, HEENT, cardiovascular, pulmonary, gi and gu systems are negative, except as otherwise noted.      Objective    Vitals - Patient Reported  Systolic (Patient Reported): 138  Diastolic (Patient Reported): 88  Weight (Patient Reported): 88.5 kg (195 lb)  Height (Patient Reported): 165.1 cm (5' 5\")  BMI (Based on Pt Reported Ht/Wt): 3.47      Vitals:  No vitals were obtained today due to virtual visit.    Physical Exam   PSYCH: Alert and oriented times 3; coherent speech, normal   rate and volume, able to articulate logical thoughts, able   to abstract reason, no tangential thoughts, no hallucinations   or delusions    RESP: No cough, no " audible wheezing, able to talk in full sentences  Remainder of exam unable to be completed due to telephone visits    He had labs done at a wellness visit at work - he will send in a copy of the results.            Phone call duration: 10 minutes

## 2021-03-29 ENCOUNTER — VIRTUAL VISIT (OUTPATIENT)
Dept: FAMILY MEDICINE | Facility: CLINIC | Age: 61
End: 2021-03-29
Payer: COMMERCIAL

## 2021-03-29 DIAGNOSIS — I10 ESSENTIAL HYPERTENSION: ICD-10-CM

## 2021-03-29 DIAGNOSIS — M17.0 PRIMARY OSTEOARTHRITIS OF BOTH KNEES: ICD-10-CM

## 2021-03-29 DIAGNOSIS — F33.0 MAJOR DEPRESSIVE DISORDER, RECURRENT EPISODE, MILD (H): Primary | ICD-10-CM

## 2021-03-29 PROCEDURE — 99214 OFFICE O/P EST MOD 30 MIN: CPT | Mod: TEL | Performed by: NURSE PRACTITIONER

## 2021-03-29 RX ORDER — CELECOXIB 100 MG/1
200 CAPSULE ORAL DAILY
Qty: 180 CAPSULE | Refills: 3 | Status: SHIPPED | OUTPATIENT
Start: 2021-03-29 | End: 2022-04-13

## 2021-03-29 RX ORDER — LOSARTAN POTASSIUM 100 MG/1
100 TABLET ORAL DAILY
Qty: 90 TABLET | Refills: 3 | Status: SHIPPED | OUTPATIENT
Start: 2021-03-29 | End: 2022-02-24

## 2021-03-29 RX ORDER — DULOXETIN HYDROCHLORIDE 60 MG/1
CAPSULE, DELAYED RELEASE ORAL
Qty: 90 CAPSULE | Refills: 3 | Status: SHIPPED | OUTPATIENT
Start: 2021-03-29 | End: 2022-04-11

## 2021-03-29 RX ORDER — BUPROPION HYDROCHLORIDE 300 MG/1
300 TABLET ORAL EVERY MORNING
Qty: 90 TABLET | Refills: 3 | Status: SHIPPED | OUTPATIENT
Start: 2021-03-29 | End: 2022-03-14

## 2021-03-29 ASSESSMENT — PATIENT HEALTH QUESTIONNAIRE - PHQ9: SUM OF ALL RESPONSES TO PHQ QUESTIONS 1-9: 6

## 2021-03-29 NOTE — PATIENT INSTRUCTIONS
Thank you for choosing [facility].  You may be receiving an email and/or telephone survey request from [facility] Customer Experience regarding your visit today.  Please take a few minutes to respond to the survey to let us know how we are doing.      If you have questions or concerns, please contact us via [employer] or you can contact your care team at [phone].    Our Clinic hours are:  Monday 6:40 am  to 7:00 pm  Tuesday -Friday 6:40 am to 5:00 pm    The [place] outpatient lab hours are:  Monday - Friday 6:10 am to 4:45 pm  Saturdays 7:00 am to 11:00 am  Appointments are required, call [phone]    If you have clinical questions after hours or would like to schedule an appointment,  call the clinic at [phone].

## 2021-04-22 ENCOUNTER — IMMUNIZATION (OUTPATIENT)
Dept: NURSING | Facility: CLINIC | Age: 61
End: 2021-04-22
Payer: COMMERCIAL

## 2021-04-22 PROCEDURE — 0001A PR COVID VAC PFIZER DIL RECON 30 MCG/0.3 ML IM: CPT

## 2021-04-22 PROCEDURE — 91300 PR COVID VAC PFIZER DIL RECON 30 MCG/0.3 ML IM: CPT

## 2021-05-13 ENCOUNTER — IMMUNIZATION (OUTPATIENT)
Dept: NURSING | Facility: CLINIC | Age: 61
End: 2021-05-13
Attending: INTERNAL MEDICINE
Payer: COMMERCIAL

## 2021-05-13 PROCEDURE — 91300 PR COVID VAC PFIZER DIL RECON 30 MCG/0.3 ML IM: CPT

## 2021-05-13 PROCEDURE — 0002A PR COVID VAC PFIZER DIL RECON 30 MCG/0.3 ML IM: CPT

## 2021-05-27 NOTE — TELEPHONE ENCOUNTER
Patient notified.  Juli CHAIREZ RN     Solaraze Counseling:  I discussed with the patient the risks of Solaraze including but not limited to erythema, scaling, itching, weeping, crusting, and pain.

## 2021-05-30 ENCOUNTER — HEALTH MAINTENANCE LETTER (OUTPATIENT)
Age: 61
End: 2021-05-30

## 2021-07-19 ENCOUNTER — TELEPHONE (OUTPATIENT)
Dept: FAMILY MEDICINE | Facility: CLINIC | Age: 61
End: 2021-07-19

## 2021-07-19 DIAGNOSIS — Z12.11 SPECIAL SCREENING FOR MALIGNANT NEOPLASMS, COLON: Primary | ICD-10-CM

## 2021-07-19 NOTE — LETTER
[facility]  [address] [address]  [phone]    July 19, 2021    Kwabena Richards  [address] [facility] [address]          Dear Kwabena,    --Colon cancer screening is generally recommended in all patients over the age of 50 years of age. This can be with either colonoscopy every 10 years, or testing the stool for blood one time per year (called a FIT test, a simple card you can send back to us in the mail). On review of our electronic medical record it appears you are due for colon cancer screening. Please call the clinic to assist in setting up a colonoscopy or, if you prefer, setting up the test for stool blood(FIT).    I've enclosed a Fit kit-  Last done on 7/13/2020;  If you prefer a colonoscopy this year, please call our office for an order.      If you have had this test at an outside facility, please let us know so that we can update your record.     Please disregard this notice if you have already scheduled an appointment.     Sincerely,    Shraddha VILLAGRAN

## 2021-07-19 NOTE — TELEPHONE ENCOUNTER
Patient Quality Outreach Summary      Summary:    Patient is due/failing the following:   FIT    Type of outreach:    Sent letter.   FIT kit mailed to patient today.    Questions for provider review:    None                                                                                                                    EVELYN HAYNES

## 2021-08-10 ENCOUNTER — OFFICE VISIT (OUTPATIENT)
Dept: FAMILY MEDICINE | Facility: CLINIC | Age: 61
End: 2021-08-10
Payer: COMMERCIAL

## 2021-08-10 ENCOUNTER — HOSPITAL ENCOUNTER (OUTPATIENT)
Dept: GENERAL RADIOLOGY | Facility: CLINIC | Age: 61
Discharge: HOME | End: 2021-08-10
Attending: NURSE PRACTITIONER | Admitting: NURSE PRACTITIONER
Payer: COMMERCIAL

## 2021-08-10 VITALS
HEIGHT: 65 IN | SYSTOLIC BLOOD PRESSURE: 138 MMHG | WEIGHT: 203.3 LBS | RESPIRATION RATE: 17 BRPM | DIASTOLIC BLOOD PRESSURE: 86 MMHG | OXYGEN SATURATION: 97 % | HEART RATE: 80 BPM | BODY MASS INDEX: 33.87 KG/M2 | TEMPERATURE: 99.5 F

## 2021-08-10 DIAGNOSIS — M25.461 EFFUSION OF RIGHT KNEE: ICD-10-CM

## 2021-08-10 DIAGNOSIS — M17.11 PRIMARY OSTEOARTHRITIS OF RIGHT KNEE: Primary | ICD-10-CM

## 2021-08-10 PROCEDURE — 99213 OFFICE O/P EST LOW 20 MIN: CPT | Mod: 25 | Performed by: NURSE PRACTITIONER

## 2021-08-10 PROCEDURE — 20610 DRAIN/INJ JOINT/BURSA W/O US: CPT | Performed by: NURSE PRACTITIONER

## 2021-08-10 PROCEDURE — 73562 X-RAY EXAM OF KNEE 3: CPT | Mod: RT

## 2021-08-10 RX ORDER — TRIAMCINOLONE ACETONIDE 40 MG/ML
40 INJECTION, SUSPENSION INTRA-ARTICULAR; INTRAMUSCULAR ONCE
Status: COMPLETED | OUTPATIENT
Start: 2021-08-10 | End: 2021-08-10

## 2021-08-10 RX ORDER — LIDOCAINE HYDROCHLORIDE 20 MG/ML
40 INJECTION, SOLUTION INFILTRATION; PERINEURAL ONCE
Status: COMPLETED | OUTPATIENT
Start: 2021-08-10 | End: 2021-08-10

## 2021-08-10 RX ORDER — LIDOCAINE HYDROCHLORIDE 10 MG/ML
2 INJECTION, SOLUTION EPIDURAL; INFILTRATION; INTRACAUDAL; PERINEURAL ONCE
Status: DISCONTINUED | OUTPATIENT
Start: 2021-08-10 | End: 2021-08-10 | Stop reason: CLARIF

## 2021-08-10 RX ADMIN — LIDOCAINE HYDROCHLORIDE 40 MG: 20 INJECTION, SOLUTION INFILTRATION; PERINEURAL at 17:09

## 2021-08-10 RX ADMIN — TRIAMCINOLONE ACETONIDE 40 MG: 40 INJECTION, SUSPENSION INTRA-ARTICULAR; INTRAMUSCULAR at 17:11

## 2021-08-10 ASSESSMENT — MIFFLIN-ST. JEOR: SCORE: 1649.04

## 2021-08-10 NOTE — PROGRESS NOTES
Assessment & Plan     Primary osteoarthritis of right knee  Repeated XR of right knee with no changes from past XR in 2018.  Patient has osteoarthritis of right knee with acute knee effusion.  Treated today with Kenalog injection in the right knee joint.  Patient tolerated this well.  Discussed that this can take a couple days to reduce symptoms and discussed symptoms to watch for infection.  Follow-up recommended as needed if pain and swelling are not improving for re-evaluation and possible referral.  - triamcinolone (KENALOG-40) injection 40 mg  - lidocaine 2% injection (MDV)    Effusion of right knee  - XR Knee Right 3 Views; Future    Molly Vital NP  [facility] 61 [facility] who presents for the following health issues;     HPI     Musculoskeletal problem/pain  Onset/Duration: 1 month   Description  Location: knee - right  Joint Swelling: YES  Redness: no  Pain: YES- little bit   Warmth: no  Intensity:  moderate  Progression of Symptoms:  Depends on the day and what he has done  Accompanying signs and symptoms:   Fevers: no  Numbness/tingling/weakness: no  History  Trauma to the area: no  Recent illness:  no  Previous similar problem: YES- ongoing  Previous evaluation:  YES- history of osteoarthritis in both knees   Precipitating or alleviating factors:  Aggravating factors include: walking, climbing stairs, exercise and overuse  Therapies tried and outcome: ice, acetaminophen and chiropractor has not really been helping     Hypoextended right knee this winter.  Started having swelling now in the knee over the last month, walking gingerly lately due to pain causing more pain in the back of the knee also.    Review of Systems   CONSTITUTIONAL: NEGATIVE for fever, chills, change in weight  RESP: NEGATIVE for significant cough or SOB  CV: NEGATIVE for chest pain, palpitations or peripheral edema  MUSCULOSKELETAL: POSITIVE  for joint pain right knee and  "joint swelling right knee  PSYCHIATRIC: NEGATIVE for changes in mood or affect  ROS otherwise negative      Objective    /86   Pulse 80   Temp 99.5  F (37.5  C) (Tympanic)   Resp 17   Ht 1.651 m (5' 5\")   Wt 92.2 kg (203 lb 4.8 oz)   SpO2 97%   BMI 33.83 kg/m    Body mass index is 33.83 kg/m .  Physical Exam   GENERAL: healthy, alert and no distress  MS: moderate edema to right knee, with some limitiation of flextion and mild tenderness in medial and lateral joint spaces as well as anterior knee, also some tenderness in posterior lateral aspect of the knee  PSYCH: mentation appears normal, affect normal/bright    Procedure:  Obtained consent from patient after discussing risks and benefits.  Used alcohol prep to medial aspect of the right anterior lower knee in joint space.  Injected Kenalog 40 mg and Lidocaine 2 % 2 ml into the joint space.  There was minimal bleeding that was stopped with pressure and bandaid placed over the area.  Patient tolerated procedure well.    "

## 2021-08-11 NOTE — PATIENT INSTRUCTIONS
1.  Elevate and ice/heat.  2.  Watch for redness or increase in pain in the knee and follow-up in clinic since this can be infection secondary to the injection.  3.  You may not feel complete relief for a couple days.  4.  Follow-up if any persistent or worsening symptoms for discussion on Physical Therapy versus orthopedic referral.

## 2021-09-19 ENCOUNTER — HEALTH MAINTENANCE LETTER (OUTPATIENT)
Age: 61
End: 2021-09-19

## 2021-11-11 DIAGNOSIS — H69.92 DYSFUNCTION OF LEFT EUSTACHIAN TUBE: ICD-10-CM

## 2021-11-11 RX ORDER — FLUTICASONE PROPIONATE 50 MCG
SPRAY, SUSPENSION (ML) NASAL
Qty: 32 ML | Refills: 1 | Status: SHIPPED | OUTPATIENT
Start: 2021-11-11 | End: 2022-12-06

## 2022-02-18 DIAGNOSIS — G47.33 OSA (OBSTRUCTIVE SLEEP APNEA): Primary | ICD-10-CM

## 2022-02-23 DIAGNOSIS — I10 ESSENTIAL HYPERTENSION: ICD-10-CM

## 2022-02-24 RX ORDER — LOSARTAN POTASSIUM 100 MG/1
TABLET ORAL
Qty: 90 TABLET | Refills: 3 | Status: SHIPPED | OUTPATIENT
Start: 2022-02-24 | End: 2023-02-23

## 2022-02-24 NOTE — TELEPHONE ENCOUNTER
"Requested Prescriptions   Pending Prescriptions Disp Refills     losartan (COZAAR) 100 MG tablet [Pharmacy Med Name: LOSARTAN POTASSIUM 100 MG TAB] 90 tablet 3     Sig: TAKE 1 TABLET BY MOUTH EVERY DAY       Angiotensin-II Receptors Failed - 2/23/2022  3:18 AM        Failed - Normal serum creatinine on file in past 12 months     Recent Labs   Lab Test 04/15/19  0812   CR 0.83       Ok to refill medication if creatinine is low          Failed - Normal serum potassium on file in past 12 months     Recent Labs   Lab Test 04/15/19  0812   POTASSIUM 4.2                    Passed - Last blood pressure under 140/90 in past 12 months     BP Readings from Last 3 Encounters:   08/10/21 138/86   08/17/20 121/78   01/02/20 130/82                 Passed - Recent (12 mo) or future (30 days) visit within the authorizing provider's specialty     Patient has had an office visit with the authorizing provider or a provider within the authorizing providers department within the previous 12 mos or has a future within next 30 days. See \"Patient Info\" tab in inbasket, or \"Choose Columns\" in Meds & Orders section of the refill encounter.              Passed - Medication is active on med list        Passed - Patient is age 18 or older             "

## 2022-03-13 DIAGNOSIS — F33.0 MAJOR DEPRESSIVE DISORDER, RECURRENT EPISODE, MILD (H): ICD-10-CM

## 2022-03-14 RX ORDER — BUPROPION HYDROCHLORIDE 300 MG/1
300 TABLET ORAL EVERY MORNING
Qty: 30 TABLET | Refills: 0 | Status: SHIPPED | OUTPATIENT
Start: 2022-03-14 | End: 2022-04-12

## 2022-03-14 NOTE — TELEPHONE ENCOUNTER
"Requested Prescriptions   Pending Prescriptions Disp Refills    buPROPion (WELLBUTRIN XL) 300 MG 24 hr tablet [Pharmacy Med Name: BUPROPION HCL  MG TABLET] 90 tablet 3     Sig: TAKE 1 TABLET BY MOUTH EVERY MORNING        SSRIs Protocol Failed - 3/13/2022  7:33 AM        Failed - PHQ-9 score less than 5 in past 6 months     Please review last PHQ-9 score.           Failed - Recent (6 mo) or future (30 days) visit within the authorizing provider's specialty     Patient had office visit in the last 6 months or has a visit in the next 30 days with authorizing provider or within the authorizing provider's specialty.  See \"Patient Info\" tab in inbasket, or \"Choose Columns\" in Meds & Orders section of the refill encounter.            Passed - Medication is Bupropion     If the medication is Bupropion (Wellbutrin), and the patient is taking for smoking cessation; OK to refill.            Passed - Medication is active on med list        Passed - Patient is age 18 or older              "

## 2022-04-01 ENCOUNTER — TRANSFERRED RECORDS (OUTPATIENT)
Dept: HEALTH INFORMATION MANAGEMENT | Facility: CLINIC | Age: 62
End: 2022-04-01
Payer: COMMERCIAL

## 2022-04-08 DIAGNOSIS — F33.0 MAJOR DEPRESSIVE DISORDER, RECURRENT EPISODE, MILD (H): ICD-10-CM

## 2022-04-09 DIAGNOSIS — F33.0 MAJOR DEPRESSIVE DISORDER, RECURRENT EPISODE, MILD (H): ICD-10-CM

## 2022-04-11 RX ORDER — DULOXETIN HYDROCHLORIDE 60 MG/1
CAPSULE, DELAYED RELEASE ORAL
Qty: 90 CAPSULE | Refills: 0 | Status: SHIPPED | OUTPATIENT
Start: 2022-04-11 | End: 2022-04-18

## 2022-04-11 NOTE — TELEPHONE ENCOUNTER
Patient advised he is due to be seen. Scheduled him for 4/18/22. Akanksha refill given.    Heaven Slaughter RN

## 2022-04-12 RX ORDER — BUPROPION HYDROCHLORIDE 300 MG/1
300 TABLET ORAL EVERY MORNING
Qty: 30 TABLET | Refills: 0 | Status: SHIPPED | OUTPATIENT
Start: 2022-04-12 | End: 2022-04-18

## 2022-04-12 NOTE — TELEPHONE ENCOUNTER
Routing refill request to provider for review/approval because:  Akanksha given x1 and patient did not follow up, please advise    Shelley Dooley RN BSN PHN

## 2022-04-18 ENCOUNTER — OFFICE VISIT (OUTPATIENT)
Dept: FAMILY MEDICINE | Facility: CLINIC | Age: 62
End: 2022-04-18
Payer: COMMERCIAL

## 2022-04-18 VITALS
HEART RATE: 91 BPM | WEIGHT: 195.2 LBS | TEMPERATURE: 99 F | SYSTOLIC BLOOD PRESSURE: 150 MMHG | DIASTOLIC BLOOD PRESSURE: 100 MMHG | RESPIRATION RATE: 12 BRPM | BODY MASS INDEX: 32.52 KG/M2 | OXYGEN SATURATION: 97 % | HEIGHT: 65 IN

## 2022-04-18 DIAGNOSIS — Z11.59 NEED FOR HEPATITIS C SCREENING TEST: ICD-10-CM

## 2022-04-18 DIAGNOSIS — Z13.220 SCREENING FOR HYPERLIPIDEMIA: ICD-10-CM

## 2022-04-18 DIAGNOSIS — Z11.4 SCREENING FOR HIV (HUMAN IMMUNODEFICIENCY VIRUS): ICD-10-CM

## 2022-04-18 DIAGNOSIS — F33.0 MAJOR DEPRESSIVE DISORDER, RECURRENT EPISODE, MILD (H): Primary | ICD-10-CM

## 2022-04-18 DIAGNOSIS — M17.0 PRIMARY OSTEOARTHRITIS OF BOTH KNEES: ICD-10-CM

## 2022-04-18 DIAGNOSIS — I10 ESSENTIAL HYPERTENSION: ICD-10-CM

## 2022-04-18 PROCEDURE — 99214 OFFICE O/P EST MOD 30 MIN: CPT | Performed by: NURSE PRACTITIONER

## 2022-04-18 RX ORDER — CELECOXIB 100 MG/1
200 CAPSULE ORAL DAILY
Qty: 180 CAPSULE | Refills: 3 | Status: SHIPPED | OUTPATIENT
Start: 2022-04-18 | End: 2023-04-06

## 2022-04-18 RX ORDER — BUPROPION HYDROCHLORIDE 300 MG/1
300 TABLET ORAL EVERY MORNING
Qty: 90 TABLET | Refills: 3 | Status: SHIPPED | OUTPATIENT
Start: 2022-04-18 | End: 2023-02-23

## 2022-04-18 RX ORDER — DULOXETIN HYDROCHLORIDE 60 MG/1
CAPSULE, DELAYED RELEASE ORAL
Qty: 90 CAPSULE | Refills: 3 | Status: SHIPPED | OUTPATIENT
Start: 2022-04-18 | End: 2023-06-05

## 2022-04-18 RX ORDER — HYDROCHLOROTHIAZIDE 12.5 MG/1
12.5 TABLET ORAL DAILY
Qty: 30 TABLET | Refills: 1 | Status: SHIPPED | OUTPATIENT
Start: 2022-04-18 | End: 2022-05-27

## 2022-04-18 RX ORDER — TIMOLOL MALEATE 5 MG/ML
0.5 SOLUTION/ DROPS OPHTHALMIC DAILY
COMMUNITY
Start: 2022-04-06

## 2022-04-18 ASSESSMENT — ANXIETY QUESTIONNAIRES
5. BEING SO RESTLESS THAT IT IS HARD TO SIT STILL: NOT AT ALL
IF YOU CHECKED OFF ANY PROBLEMS ON THIS QUESTIONNAIRE, HOW DIFFICULT HAVE THESE PROBLEMS MADE IT FOR YOU TO DO YOUR WORK, TAKE CARE OF THINGS AT HOME, OR GET ALONG WITH OTHER PEOPLE: NOT DIFFICULT AT ALL
6. BECOMING EASILY ANNOYED OR IRRITABLE: SEVERAL DAYS
3. WORRYING TOO MUCH ABOUT DIFFERENT THINGS: SEVERAL DAYS
7. FEELING AFRAID AS IF SOMETHING AWFUL MIGHT HAPPEN: NOT AT ALL
2. NOT BEING ABLE TO STOP OR CONTROL WORRYING: SEVERAL DAYS
GAD7 TOTAL SCORE: 4
1. FEELING NERVOUS, ANXIOUS, OR ON EDGE: SEVERAL DAYS

## 2022-04-18 ASSESSMENT — PATIENT HEALTH QUESTIONNAIRE - PHQ9
SUM OF ALL RESPONSES TO PHQ QUESTIONS 1-9: 4
5. POOR APPETITE OR OVEREATING: NOT AT ALL

## 2022-04-18 ASSESSMENT — PAIN SCALES - GENERAL: PAINLEVEL: NO PAIN (0)

## 2022-04-18 NOTE — PATIENT INSTRUCTIONS
For blood pressure:  Add hydrochlorothiazide 12.5 mg daily  Return in one month for a nurse appointment for blood pressure recheck and lab recheck.

## 2022-04-18 NOTE — PROGRESS NOTES
Assessment & Plan     Major depressive disorder, recurrent episode, mild (H)  Well controlled.  - buPROPion (WELLBUTRIN XL) 300 MG 24 hr tablet; Take 1 tablet (300 mg) by mouth every morning  - DULoxetine (CYMBALTA) 60 MG capsule; TAKE 1 CAPSULE BY MOUTH EVERY DAY    Primary osteoarthritis of both knees  Well controlled.  - celecoxib (CELEBREX) 100 MG capsule; Take 2 capsules (200 mg) by mouth daily    Essential hypertension  Poorly controlled.  Continue losartan.  Add hydrochlorothiazide.  Follow up in one month.  - BASIC METABOLIC PANEL; Future  - hydrochlorothiazide (HYDRODIURIL) 12.5 MG tablet; Take 1 tablet (12.5 mg) by mouth daily    Screening for hyperlipidemia  - Lipid panel reflex to direct LDL Fasting; Future    Need for hepatitis C screening test  - Hepatitis C Screen Reflex to HCV RNA Quant and Genotype; Future    Screening for HIV (human immunodeficiency virus)  - HIV Antigen Antibody Combo; Future                 Patient Instructions   For blood pressure:  Add hydrochlorothiazide 12.5 mg daily  Return in one month for a nurse appointment for blood pressure recheck and lab recheck.       Return in about 4 weeks (around 5/16/2022) for BP Recheck.      The risks, benefits and treatment options of prescribed medications or other treatments have been discussed with the patient. The patient verbalized their understanding and should call or follow up if no improvement or if they develop further problems.    [facility] is a 61 year [facility] who presents for the following health issues     HPI     Hypertension Follow-up      Do you check your blood pressure regularly outside of the clinic? No     Are you following a low salt diet? Yes    Are your blood pressures ever more than 140 on the top number (systolic) OR more   than 90 on the bottom number (diastolic), for example 140/90? No     BP Readings from Last 6 Encounters:   04/18/22 (!)  150/100   08/10/21 138/86   20 121/78   20 130/82   19 135/84   11/15/19 131/83     Wt Readings from Last 4 Encounters:   22 88.5 kg (195 lb 3.2 oz)   08/10/21 92.2 kg (203 lb 4.8 oz)   20 88.5 kg (195 lb)   20 93 kg (205 lb)         Depression Followup    How are you doing with your depression since your last visit? No change    Are you having other symptoms that might be associated with depression? No    Have you had a significant life event?  Job Concerns     Are you feeling anxious or having panic attacks?   No    Do you have any concerns with your use of alcohol or other drugs? No    Social History     Tobacco Use     Smoking status: Former Smoker     Quit date: 1995     Years since quittin.8     Smokeless tobacco: Never Used   Vaping Use     Vaping Use: Never used   Substance Use Topics     Alcohol use: Yes     Alcohol/week: 4.0 standard drinks     Types: 4 Standard drinks or equivalent per week     Comment: drinks once a week, only on Friday     Drug use: No     PHQ 2020 2020 3/29/2021   PHQ-9 Total Score 0 0 6   Q9: Thoughts of better off dead/self-harm past 2 weeks Not at all Not at all Not at all     VISHNU-7 SCORE 3/27/2018 4/15/2019 2020   Total Score - - -   Total Score 0 0 0     Last PHQ-9 3/29/2021   1.  Little interest or pleasure in doing things 2   2.  Feeling down, depressed, or hopeless 2   3.  Trouble falling or staying asleep, or sleeping too much 0   4.  Feeling tired or having little energy 2   5.  Poor appetite or overeating 0   6.  Feeling bad about yourself 0   7.  Trouble concentrating 0   8.  Moving slowly or restless 0   Q9: Thoughts of better off dead/self-harm past 2 weeks 0   PHQ-9 Total Score 6   Difficulty at work, home, or with people Not difficult at all     VISHNU-7  2020   1. Feeling nervous, anxious, or on edge 0   2. Not being able to stop or control worrying 0   3. Worrying too much about different things 0   4.  "Trouble relaxing 0   5. Being so restless that it is hard to sit still 0   6. Becoming easily annoyed or irritable 0   7. Feeling afraid, as if something awful might happen 0   VISHNU-7 Total Score 0   If you checked any problems, how difficult have they made it for you to do your work, take care of things at home, or get along with other people? Not difficult at all       Suicide Assessment Five-step Evaluation and Treatment (SAFE-T)      How many servings of fruits and vegetables do you eat daily?  2-3    On average, how many sweetened beverages do you drink each day (Examples: soda, juice, sweet tea, etc.  Do NOT count diet or artificially sweetened beverages)?   0    How many days per week do you exercise enough to make your heart beat faster? 3 or less    How many minutes a day do you exercise enough to make your heart beat faster? 9 or less    How many days per week do you miss taking your medication? 0        Arthritis:  Celebrex working well enough.  No side effects.            Review of Systems   Constitutional, HEENT, cardiovascular, pulmonary, gi and gu systems are negative, except as otherwise noted.      Objective    BP (!) 150/100   Pulse 91   Temp 99  F (37.2  C) (Tympanic)   Resp 12   Ht 1.651 m (5' 5\")   Wt 88.5 kg (195 lb 3.2 oz)   SpO2 97%   BMI 32.48 kg/m    Body mass index is 32.48 kg/m .  Physical Exam   GENERAL: healthy, alert and no distress  NECK: no adenopathy, no asymmetry, masses, or scars and thyroid normal to palpation  RESP: lungs clear to auscultation - no rales, rhonchi or wheezes  CV: regular rate and rhythm, normal S1 S2, no S3 or S4, no murmur, click or rub, no peripheral edema and peripheral pulses strong  MS: no gross musculoskeletal defects noted, no edema  PSYCH: mentation appears normal, affect normal/bright                "

## 2022-04-19 ASSESSMENT — ANXIETY QUESTIONNAIRES: GAD7 TOTAL SCORE: 4

## 2022-05-12 DIAGNOSIS — I10 ESSENTIAL HYPERTENSION: ICD-10-CM

## 2022-05-12 RX ORDER — HYDROCHLOROTHIAZIDE 12.5 MG/1
TABLET ORAL
Qty: 30 TABLET | Refills: 1 | OUTPATIENT
Start: 2022-05-12

## 2022-05-12 NOTE — TELEPHONE ENCOUNTER
Refill reqt for hydrochlorothiazide. Pt should have 1 refill on file. Also pt to follow up on bp    Ayaan Rico RN

## 2022-05-26 ENCOUNTER — ALLIED HEALTH/NURSE VISIT (OUTPATIENT)
Dept: FAMILY MEDICINE | Facility: CLINIC | Age: 62
End: 2022-05-26
Payer: COMMERCIAL

## 2022-05-26 ENCOUNTER — TELEPHONE (OUTPATIENT)
Dept: FAMILY MEDICINE | Facility: CLINIC | Age: 62
End: 2022-05-26

## 2022-05-26 ENCOUNTER — LAB (OUTPATIENT)
Dept: LAB | Facility: CLINIC | Age: 62
End: 2022-05-26

## 2022-05-26 VITALS — HEART RATE: 76 BPM | DIASTOLIC BLOOD PRESSURE: 92 MMHG | SYSTOLIC BLOOD PRESSURE: 140 MMHG

## 2022-05-26 DIAGNOSIS — I10 ESSENTIAL HYPERTENSION: ICD-10-CM

## 2022-05-26 DIAGNOSIS — Z11.4 SCREENING FOR HIV (HUMAN IMMUNODEFICIENCY VIRUS): ICD-10-CM

## 2022-05-26 DIAGNOSIS — I10 ESSENTIAL HYPERTENSION: Primary | ICD-10-CM

## 2022-05-26 DIAGNOSIS — Z11.59 NEED FOR HEPATITIS C SCREENING TEST: ICD-10-CM

## 2022-05-26 LAB
ANION GAP SERPL CALCULATED.3IONS-SCNC: 10 MMOL/L (ref 3–14)
BUN SERPL-MCNC: 19 MG/DL (ref 7–30)
CALCIUM SERPL-MCNC: 9.2 MG/DL (ref 8.5–10.1)
CHLORIDE BLD-SCNC: 111 MMOL/L (ref 94–109)
CO2 SERPL-SCNC: 21 MMOL/L (ref 20–32)
CREAT SERPL-MCNC: 1.07 MG/DL (ref 0.66–1.25)
GFR SERPL CREATININE-BSD FRML MDRD: 78 ML/MIN/1.73M2
GLUCOSE BLD-MCNC: 102 MG/DL (ref 70–99)
POTASSIUM BLD-SCNC: 4.5 MMOL/L (ref 3.4–5.3)
SODIUM SERPL-SCNC: 142 MMOL/L (ref 133–144)

## 2022-05-26 PROCEDURE — 99207 PR NO CHARGE NURSE ONLY: CPT

## 2022-05-26 PROCEDURE — 80048 BASIC METABOLIC PNL TOTAL CA: CPT

## 2022-05-26 PROCEDURE — 87389 HIV-1 AG W/HIV-1&-2 AB AG IA: CPT

## 2022-05-26 PROCEDURE — 86803 HEPATITIS C AB TEST: CPT

## 2022-05-26 PROCEDURE — 36415 COLL VENOUS BLD VENIPUNCTURE: CPT

## 2022-05-26 NOTE — TELEPHONE ENCOUNTER
Kwabena Richards is a 62 year old patient who comes in today for a Blood Pressure check because of medication change.  Started hydrochlorothiazide.    Vital Signs as repeated by RN today:  /96, then 140/92 5 minutes later.  Pulse 76.    Patient is taking medication as prescribed.  Patient is tolerating medications well.  Patient is not monitoring Blood Pressure at home.    Current complaints: none  RN would like to note that patient was waiting over 30 minutes in [facility] for his appointment, so this may have contributed to elevated BP.  Patient was also not aware that he needed labs done and had not scheduled lab appt.  RN notified patient, assisted with scheduling same-day lab and walked patient over to lab for BMP-will prioritize that today, as patient is not fasting for lipid panel and thinks he may have already had this done with his work labs (paperwork given to PCP in April at appt.)  Disposition:  Forwarding to provider to advise please.     Irene Adair RN  [facility]

## 2022-05-26 NOTE — PROGRESS NOTES
Kwabena Richards is a 62 year old patient who comes in today for a Blood Pressure check because of medication change.  Started hydrochlorothiazide.    Vital Signs as repeated by RN today:  /96, then 140/92 5 minutes later.  Pulse 76.    Patient is taking medication as prescribed.  Patient is tolerating medications well.  Patient is not monitoring Blood Pressure at home.    Current complaints: none  RN would like to note that patient was waiting over 30 minutes in [facility] for his appointment, so this may have contributed to elevated BP.  Patient was also not aware that he needed labs done and had not scheduled lab appt.  RN notified patient, assisted with scheduling same-day lab and walked patient over to lab for BMP-will prioritize that today, as patient is not fasting for lipid panel and thinks he may have already had this done with his work labs (paperwork given to PCP in April at appt.)  Disposition:  Forwarding to provider to advise please.     Ireen Adair RN  [facility]

## 2022-05-27 LAB
HCV AB SERPL QL IA: NONREACTIVE
HIV 1+2 AB+HIV1 P24 AG SERPL QL IA: NONREACTIVE

## 2022-05-27 RX ORDER — HYDROCHLOROTHIAZIDE 25 MG/1
12.5 TABLET ORAL DAILY
Qty: 90 TABLET | Refills: 3 | Status: SHIPPED | OUTPATIENT
Start: 2022-05-27 | End: 2022-06-03

## 2022-05-27 NOTE — TELEPHONE ENCOUNTER
Covering for PCP (out of clinic today):  BP improved from last check since starting hydrochlorothiazide.  Labs look good.  Recommend increasing hydrochlorothiazide to 25mg per day and return in 1-2 weeks for lab and RN BP recheck.    Thanks,  Dennis Wang MD

## 2022-06-03 ENCOUNTER — TELEPHONE (OUTPATIENT)
Dept: FAMILY MEDICINE | Facility: CLINIC | Age: 62
End: 2022-06-03
Payer: COMMERCIAL

## 2022-06-03 DIAGNOSIS — I10 ESSENTIAL HYPERTENSION: ICD-10-CM

## 2022-06-03 RX ORDER — HYDROCHLOROTHIAZIDE 25 MG/1
25 TABLET ORAL DAILY
Qty: 90 TABLET | Refills: 3 | Status: SHIPPED | OUTPATIENT
Start: 2022-06-03 | End: 2023-06-05

## 2022-06-03 NOTE — TELEPHONE ENCOUNTER
Routed to Shraddha Loyd    Need clarification of Hydrochlorothiazide dose. See note from Dr. Wang on 5/26/2022 regarding increasing dose to 25 mg per day after seen by RN for BP check. Medication was ordered on 5/27/2022 for 25 mg tablet. Patient to take 0.5 mg tablet (12.5 mg) daily. Also see phone call information from patient.    Thank you,  Tere Felipe RN     [phone]

## 2022-06-03 NOTE — TELEPHONE ENCOUNTER
Reason for Call:  Medication question:    Do you use a [facility]?  Name of the pharmacy and phone number for the current request:  [facility] [place] [phone]    Name of the medication requested: HCTZ    Other request:  Patient said he was told YASMANY Loyd to increase dosage of hydrochlorothiazide and take (1) 12.5 tablet daily, but rx was to take 1/2 tablet daily.Patient is wondering what he should do.       Can we leave a detailed message on this number? YES    Phone number patient can be reached at: Work number on file:  [phone] (work)    Best Time: Any    Call taken on 6/3/2022 at 8:51 AM by Twila Black

## 2022-06-03 NOTE — TELEPHONE ENCOUNTER
Reviewed Dr Wang's note. Hydrochlorothiazide should be 25 mg daily.  Wrong script sent in at time of note.  Script corrected and re-sent  Shraddha Loyd, CNP     [Mother] : mother

## 2022-06-13 ENCOUNTER — TELEPHONE (OUTPATIENT)
Dept: FAMILY MEDICINE | Facility: CLINIC | Age: 62
End: 2022-06-13
Payer: COMMERCIAL

## 2022-06-13 NOTE — TELEPHONE ENCOUNTER
Blood pressure was elevated at last clinic visit. It is important to maintain a blood pressure <140/90.  Please call patient and ask them to:      Come in for a free RN appointment to recheck blood pressure      Patient also due for:    Colon Cancer Screening    Shraddha Loyd, CNP

## 2022-06-13 NOTE — TELEPHONE ENCOUNTER
Patient Quality Outreach    Patient is due for the following:   Hypertension -  BP check  Colon Cancer Screening -  FIT, Colonoscopy and Cologuard    NEXT STEPS:   Schedule a nurse only visit for BP    Type of outreach:    Sent [employer] message. will postpone a few days for him r to view       Questions for provider review:    None     Florin Wagner, CMA

## 2022-06-20 NOTE — TELEPHONE ENCOUNTER
Patient Quality Outreach    Patient is due for the following:   Hypertension -  BP check    NEXT STEPS:   Schedule a nurse only visit for bp check    Type of outreach:    Phone, spoke to patient/parent. Appt scheduled for 6/21/22      Questions for provider review:    None     Pam Mora, [facility]

## 2022-06-21 ENCOUNTER — TELEPHONE (OUTPATIENT)
Dept: FAMILY MEDICINE | Facility: CLINIC | Age: 62
End: 2022-06-21

## 2022-06-21 ENCOUNTER — ALLIED HEALTH/NURSE VISIT (OUTPATIENT)
Dept: FAMILY MEDICINE | Facility: CLINIC | Age: 62
End: 2022-06-21
Payer: COMMERCIAL

## 2022-06-21 VITALS — SYSTOLIC BLOOD PRESSURE: 134 MMHG | OXYGEN SATURATION: 97 % | HEART RATE: 81 BPM | DIASTOLIC BLOOD PRESSURE: 92 MMHG

## 2022-06-21 DIAGNOSIS — I10 ESSENTIAL HYPERTENSION: Primary | ICD-10-CM

## 2022-06-21 PROCEDURE — 99207 PR NO CHARGE NURSE ONLY: CPT

## 2022-06-21 NOTE — PROGRESS NOTES
Kwabena Richards is a 62 year old year old patient who comes in today for a Blood Pressure check because of medication change.    Vital Signs as repeated by /96 p 81, 134/92 p 81    Patient is taking medication as prescribed    Patient is tolerating medications well.    Patient is not monitoring Blood Pressure at home.      Current complaints: none    Disposition:  patient instructed to await provider response      Ayaan Rico RN

## 2022-06-26 ENCOUNTER — HEALTH MAINTENANCE LETTER (OUTPATIENT)
Age: 62
End: 2022-06-26

## 2022-11-21 ENCOUNTER — HEALTH MAINTENANCE LETTER (OUTPATIENT)
Age: 62
End: 2022-11-21

## 2022-12-05 NOTE — PROGRESS NOTES
Mayuri Schaffer (:  1959) is a 58 y.o. female,Established patient, here for evaluation of the following chief complaint(s):  Follow-up (2 month )         ASSESSMENT/PLAN:  1. Essential hypertension  -     lisinopril-hydroCHLOROthiazide (PRINZIDE;ZESTORETIC) 20-25 MG per tablet; Take 1 tablet by mouth daily, Disp-90 tablet, R-3Normal  2. Encounter for screening mammogram for malignant neoplasm of breast  -     JOSE MAMMOGRAM SCREENING BILATERAL  3. Acquired hypothyroidism  4. Mixed hyperlipidemia  -     atorvastatin (LIPITOR) 20 MG tablet; TAKE 1 TABLET BY MOUTH EVERY DAY, Disp-90 tablet, R-3Normal  5. Chronic bilateral low back pain without sciatica  -     celecoxib (CELEBREX) 100 MG capsule; Take 1 capsule by mouth daily, Disp-90 capsule, R-3Normal  6. Hypothyroidism, unspecified  -     levothyroxine (SYNTHROID) 25 MCG tablet; TAKE 1 TABLET BY MOUTH EVERY DAY BEFORE BREAKFAST, Disp-90 tablet, R-3Normal      Plan:  No change in medication  Check mammogram.  She will consider colonoscopy  Get home test for COVID and take if have symptoms. Call office if positive. Return in about 9 months (around 2023) for CPX, Labs one week before. Subjective   SUBJECTIVE/OBJECTIVE:    54-year-old [place] female is here for follow up. She speaks limited [place]  Seen for 2 month follow up; before that; She has not been seen in the office in approximately 3 years. She is accompanied by her son who speaks excellent [place]. PREV NOTE:  Arthritis  She describes low back pain and right leg pain. Previously was taking meloxicam 7.5. Feels like this is no longer working. 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 Her place of work does allow workers to sit down if they have a doctor's note. Hypothyroidism:  Has been on Synthroid 25 mcg for years. We have not recheck this in several years.     Hypertension:  Blood pressure PT  WAS A WALK IN TO [place] [facility]  SATING THAT HIS HUMIDIFIER DOES NOT HEAT UP.  HE STATED HE GOT A FREE MACHINE AT A [employer] SALE AND ASKED IF IT IS OKAY TO TRY THAT HUMIDIFIER.   I EXPLAINED HOW TO SPERATE UNIT FROM HUMIDIFIER.  HE CALL ME AFTER HE GOT HOME HE STATED MACHINE  WORKS PERFECTLY,, HUMIDIFIER IS HEATING UP.  HR THANKED ME FOR HELP AND WILL CALL IF ANY OTHER ISSUES ARISE.   previously normal.  She thought she was taking blood pressure medicine in the past but she had been taking cholesterol medication. She describes recent headaches and dizziness. Initially blood pressure in the office was 195/87. Improved to 172/84. She denies chest pain, shortness of breath. Today:  She is taking the lisinopril/ hctz. Bloo dpressure much better controlled. Her back feels much better since she started celebrex once a day. 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 Review of Systems   Constitutional: Negative. Respiratory: Negative. Cardiovascular: Negative. Gastrointestinal: Negative. Musculoskeletal: Negative. Skin: Negative. Neurological: Negative. Objective   Physical Exam  Vitals and nursing note reviewed. Constitutional:       General: She is not in acute distress. Appearance: She is not ill-appearing or toxic-appearing. Cardiovascular:      Rate and Rhythm: Normal rate and regular rhythm. Pulmonary:      Effort: Pulmonary effort is normal.      Breath sounds: Normal breath sounds. Neurological:      Mental Status: She is alert. Vitals:    12/05/22 1209   BP: 124/70   Pulse:    Resp:    Temp:    SpO2:          On this date 12/5/2022 I have spent 30 minutes reviewing previous notes, test results and face to face with the patient discussing the diagnosis and importance of compliance with the treatment plan as well as documenting on the day of the visit. An electronic signature was used to authenticate this note.     --Elizabeth Evans MD, MD

## 2023-02-20 DIAGNOSIS — I10 ESSENTIAL HYPERTENSION: ICD-10-CM

## 2023-02-20 DIAGNOSIS — F33.0 MAJOR DEPRESSIVE DISORDER, RECURRENT EPISODE, MILD (H): ICD-10-CM

## 2023-02-23 RX ORDER — LOSARTAN POTASSIUM 100 MG/1
TABLET ORAL
Qty: 90 TABLET | Refills: 0 | Status: SHIPPED | OUTPATIENT
Start: 2023-02-23 | End: 2023-05-23

## 2023-02-23 RX ORDER — BUPROPION HYDROCHLORIDE 300 MG/1
TABLET ORAL
Qty: 90 TABLET | Refills: 0 | Status: SHIPPED | OUTPATIENT
Start: 2023-02-23 | End: 2023-05-23

## 2023-04-10 ENCOUNTER — LAB (OUTPATIENT)
Dept: LAB | Facility: CLINIC | Age: 63
End: 2023-04-10
Payer: COMMERCIAL

## 2023-04-10 PROCEDURE — 82274 ASSAY TEST FOR BLOOD FECAL: CPT | Performed by: NURSE PRACTITIONER

## 2023-04-12 LAB — HEMOCCULT STL QL IA: NEGATIVE

## 2023-05-01 ENCOUNTER — TRANSFERRED RECORDS (OUTPATIENT)
Dept: HEALTH INFORMATION MANAGEMENT | Facility: CLINIC | Age: 63
End: 2023-05-01
Payer: COMMERCIAL

## 2023-06-05 ENCOUNTER — OFFICE VISIT (OUTPATIENT)
Dept: FAMILY MEDICINE | Facility: CLINIC | Age: 63
End: 2023-06-05
Payer: COMMERCIAL

## 2023-06-05 VITALS
HEART RATE: 66 BPM | OXYGEN SATURATION: 96 % | BODY MASS INDEX: 31.56 KG/M2 | DIASTOLIC BLOOD PRESSURE: 78 MMHG | RESPIRATION RATE: 16 BRPM | TEMPERATURE: 97.5 F | SYSTOLIC BLOOD PRESSURE: 130 MMHG | HEIGHT: 65 IN | WEIGHT: 189.4 LBS

## 2023-06-05 DIAGNOSIS — F33.0 MAJOR DEPRESSIVE DISORDER, RECURRENT EPISODE, MILD (H): Primary | ICD-10-CM

## 2023-06-05 DIAGNOSIS — B00.1 COLD SORE: ICD-10-CM

## 2023-06-05 DIAGNOSIS — I10 ESSENTIAL HYPERTENSION: ICD-10-CM

## 2023-06-05 DIAGNOSIS — M17.0 PRIMARY OSTEOARTHRITIS OF BOTH KNEES: ICD-10-CM

## 2023-06-05 DIAGNOSIS — Z12.5 SCREENING FOR PROSTATE CANCER: ICD-10-CM

## 2023-06-05 PROCEDURE — 90714 TD VACC NO PRESV 7 YRS+ IM: CPT | Performed by: NURSE PRACTITIONER

## 2023-06-05 PROCEDURE — 99214 OFFICE O/P EST MOD 30 MIN: CPT | Mod: 25 | Performed by: NURSE PRACTITIONER

## 2023-06-05 PROCEDURE — 90471 IMMUNIZATION ADMIN: CPT | Performed by: NURSE PRACTITIONER

## 2023-06-05 PROCEDURE — 96127 BRIEF EMOTIONAL/BEHAV ASSMT: CPT | Performed by: NURSE PRACTITIONER

## 2023-06-05 RX ORDER — BUPROPION HYDROCHLORIDE 300 MG/1
300 TABLET ORAL EVERY MORNING
Qty: 90 TABLET | Refills: 3 | Status: CANCELLED | OUTPATIENT
Start: 2023-06-05

## 2023-06-05 RX ORDER — BUPROPION HYDROCHLORIDE 150 MG/1
450 TABLET ORAL EVERY MORNING
Qty: 270 TABLET | Refills: 3 | Status: SHIPPED | OUTPATIENT
Start: 2023-06-05 | End: 2024-05-17

## 2023-06-05 RX ORDER — VALACYCLOVIR HYDROCHLORIDE 1 G/1
2000 TABLET, FILM COATED ORAL 2 TIMES DAILY
Qty: 4 TABLET | Refills: 5 | Status: SHIPPED | OUTPATIENT
Start: 2023-06-05 | End: 2023-06-06

## 2023-06-05 RX ORDER — LOSARTAN POTASSIUM 100 MG/1
100 TABLET ORAL DAILY
Qty: 90 TABLET | Refills: 3 | Status: SHIPPED | OUTPATIENT
Start: 2023-06-05 | End: 2024-06-07

## 2023-06-05 RX ORDER — CELECOXIB 100 MG/1
200 CAPSULE ORAL DAILY
Qty: 180 CAPSULE | Refills: 3 | Status: SHIPPED | OUTPATIENT
Start: 2023-06-05 | End: 2024-05-31

## 2023-06-05 RX ORDER — HYDROCHLOROTHIAZIDE 25 MG/1
25 TABLET ORAL DAILY
Qty: 90 TABLET | Refills: 3 | Status: SHIPPED | OUTPATIENT
Start: 2023-06-05 | End: 2024-05-17

## 2023-06-05 RX ORDER — DULOXETIN HYDROCHLORIDE 60 MG/1
CAPSULE, DELAYED RELEASE ORAL
Qty: 90 CAPSULE | Refills: 3 | Status: SHIPPED | OUTPATIENT
Start: 2023-06-05 | End: 2024-05-17

## 2023-06-05 ASSESSMENT — PATIENT HEALTH QUESTIONNAIRE - PHQ9
5. POOR APPETITE OR OVEREATING: NOT AT ALL
SUM OF ALL RESPONSES TO PHQ QUESTIONS 1-9: 4
10. IF YOU CHECKED OFF ANY PROBLEMS, HOW DIFFICULT HAVE THESE PROBLEMS MADE IT FOR YOU TO DO YOUR WORK, TAKE CARE OF THINGS AT HOME, OR GET ALONG WITH OTHER PEOPLE: NOT DIFFICULT AT ALL
SUM OF ALL RESPONSES TO PHQ QUESTIONS 1-9: 4

## 2023-06-05 ASSESSMENT — ANXIETY QUESTIONNAIRES
5. BEING SO RESTLESS THAT IT IS HARD TO SIT STILL: NOT AT ALL
GAD7 TOTAL SCORE: 2
1. FEELING NERVOUS, ANXIOUS, OR ON EDGE: NOT AT ALL
3. WORRYING TOO MUCH ABOUT DIFFERENT THINGS: SEVERAL DAYS
6. BECOMING EASILY ANNOYED OR IRRITABLE: SEVERAL DAYS
GAD7 TOTAL SCORE: 2
7. FEELING AFRAID AS IF SOMETHING AWFUL MIGHT HAPPEN: NOT AT ALL
2. NOT BEING ABLE TO STOP OR CONTROL WORRYING: NOT AT ALL
IF YOU CHECKED OFF ANY PROBLEMS ON THIS QUESTIONNAIRE, HOW DIFFICULT HAVE THESE PROBLEMS MADE IT FOR YOU TO DO YOUR WORK, TAKE CARE OF THINGS AT HOME, OR GET ALONG WITH OTHER PEOPLE: NOT DIFFICULT AT ALL

## 2023-06-05 ASSESSMENT — PAIN SCALES - GENERAL: PAINLEVEL: MILD PAIN (3)

## 2023-06-05 NOTE — PROGRESS NOTES
Assessment & Plan     Major depressive disorder, recurrent episode, mild (H)  Patient reports having more symptoms - would like to increase the Wellbutrin.  - DULoxetine (CYMBALTA) 60 MG capsule; TAKE 1 CAPSULE BY MOUTH EVERY DAY  - buPROPion (WELLBUTRIN XL) 150 MG 24 hr tablet; Take 3 tablets (450 mg) by mouth every morning    Essential hypertension  Well controlled.  - losartan (COZAAR) 100 MG tablet; Take 1 tablet (100 mg) by mouth daily  - hydrochlorothiazide (HYDRODIURIL) 25 MG tablet; Take 1 tablet (25 mg) by mouth daily    Primary osteoarthritis of both knees  Well controlled.  - celecoxib (CELEBREX) 100 MG capsule; Take 2 capsules (200 mg) by mouth daily    Cold sore  Recommend treating with:  - valACYclovir (VALTREX) 1000 mg tablet; Take 2 tablets (2,000 mg) by mouth 2 times daily for 1 day    Screening for prostate cancer  - PSA, screen; Future      The risks, benefits and treatment options of prescribed medications or other treatments have been discussed with the patient. The patient verbalized their understanding and should call or follow up if no improvement or if they develop further problems.    [facility] is a 63 year old, presenting for the following health issues:  Hypertension, Depression, Refill Request (Celebrex), Excessive Sweating (Patient states his Mother and Wife say he sweats a lot . ), and Imm/Inj (TD)        6/5/2023     6:47 AM   Additional Questions   Roomed by Florin RIVAS   Accompanied by self     History of Present Illness       Reason for visit:  Meds checkup    He eats 2-3 servings of fruits and vegetables daily.He consumes 0 sweetened beverage(s) daily.He exercises with enough effort to increase his heart rate 9 or less minutes per day.  He exercises with enough effort to increase his heart rate 3 or less days per week.   He is taking medications regularly.    Today's PHQ-9         PHQ-9 Total Score:  4    PHQ-9 Q9 Thoughts of better off dead/self-harm past 2 weeks :   Not at all    How difficult have these problems made it for you to do your work, take care of things at home, or get along with other people: Not difficult at all       Hypertension Follow-up      Do you check your blood pressure regularly outside of the clinic? No     Are you following a low salt diet? No    Are your blood pressures ever more than 140 on the top number (systolic) OR more   than 90 on the bottom number (diastolic), for example 140/90? No    Depression Followup    How are you doing with your depression since your last visit? No change    Are you having other symptoms that might be associated with depression? Yes:  lack of interest .    Have you had a significant life event?  No     Are you feeling anxious or having panic attacks?   No    Do you have any concerns with your use of alcohol or other drugs? No    Social History     Tobacco Use     Smoking status: Former     Types: Cigarettes     Quit date: 1995     Years since quittin.9     Smokeless tobacco: Never   Vaping Use     Vaping status: Never Used   Substance Use Topics     Alcohol use: Yes     Alcohol/week: 4.0 standard drinks of alcohol     Types: 4 Standard drinks or equivalent per week     Comment: drinks once a week, only on Friday     Drug use: No         3/29/2021     8:34 AM 2022     4:27 PM 2023     6:35 AM   PHQ   PHQ-9 Total Score 6 4 4   Q9: Thoughts of better off dead/self-harm past 2 weeks Not at all Not at all Not at all         2020     4:40 PM 2022     4:27 PM 2023     6:52 AM   VISHNU-7 SCORE   Total Score 0 4 2         2023     6:35 AM   Last PHQ-9   1.  Little interest or pleasure in doing things 1   2.  Feeling down, depressed, or hopeless 1   3.  Trouble falling or staying asleep, or sleeping too much 1   4.  Feeling tired or having little energy 1   5.  Poor appetite or overeating 0   6.  Feeling bad about yourself 0   7.   "Trouble concentrating 0   8.  Moving slowly or restless 0   Q9: Thoughts of better off dead/self-harm past 2 weeks 0   PHQ-9 Total Score 4         6/5/2023     6:52 AM   VISHNU-7    1. Feeling nervous, anxious, or on edge 0   2. Not being able to stop or control worrying 0   3. Worrying too much about different things 1   4. Trouble relaxing 0   5. Being so restless that it is hard to sit still 0   6. Becoming easily annoyed or irritable 1   7. Feeling afraid, as if something awful might happen 0   VISHNU-7 Total Score 2   If you checked any problems, how difficult have they made it for you to do your work, take care of things at home, or get along with other people? Not difficult at all       Suicide Assessment Five-step Evaluation and Treatment (SAFE-T)      Concern - Excessive sweating   Onset: many years   Description: sweating with activity , when weather is hit, mainly in head   Intensity: moderate depending on activity   Progression of Symptoms:  same  Accompanying Signs & Symptoms: none   Previous history of similar problem: none   Precipitating factors:        Worsened by: hot weather, certain activities  Alleviating factors:        Improved by: resting , sitting in a cool place   Therapies tried and outcome:  none         Medication Followup of Celebrex for arthritis pain    Taking Medication as prescribed: yes    Side Effects:  None    Medication Helping Symptoms:  yes      Cold sore:  Recur rarely  Currently has 2  Using Abreva without much relief.      Review of Systems   Constitutional, HEENT, cardiovascular, pulmonary, gi and gu systems are negative, except as otherwise noted.      Objective    /78 (BP Location: Right arm, Patient Position: Chair, Cuff Size: Adult Regular)   Pulse 66   Temp 97.5  F (36.4  C) (Tympanic)   Resp 16   Ht 1.638 m (5' 4.5\")   Wt 85.9 kg (189 lb 6.4 oz)   SpO2 96%   BMI 32.01 kg/m    Body mass index is 32.01 kg/m .  Physical Exam   GENERAL: healthy, alert and no " distress  NECK: no adenopathy, no asymmetry, masses, or scars and thyroid normal to palpation  RESP: lungs clear to auscultation - no rales, rhonchi or wheezes  CV: regular rate and rhythm, normal S1 S2, no S3 or S4, no murmur, click or rub, no peripheral edema and peripheral pulses strong  MS: no gross musculoskeletal defects noted, no edema      Labs done at work - scanned into Epic  Reviewed with patient.

## 2023-06-05 NOTE — NURSING NOTE
Prior to immunization administration, verified patients identity using patient s name and date of birth. Please see Immunization Activity for additional information.     Screening Questionnaire for Adult Immunization    Are you sick today?   No   Do you have allergies to medications, food, a vaccine component or latex?   No   Have you ever had a serious reaction after receiving a vaccination?   No   Do you have a long-term health problem with heart, lung, kidney, or metabolic disease (e.g., diabetes), asthma, a blood disorder, no spleen, complement component deficiency, a cochlear implant, or a spinal fluid leak?  Are you on long-term aspirin therapy?   No   Do you have cancer, leukemia, HIV/AIDS, or any other immune system problem?   No   Do you have a parent, brother, or sister with an immune system problem?   No   In the past 3 months, have you taken medications that affect  your immune system, such as prednisone, other steroids, or anticancer drugs; drugs for the treatment of rheumatoid arthritis, Crohn s disease, or psoriasis; or have you had radiation treatments?   No   Have you had a seizure, or a brain or other nervous system problem?   No   During the past year, have you received a transfusion of blood or blood    products, or been given immune (gamma) globulin or antiviral drug?   No   For women: Are you pregnant or is there a chance you could become       pregnant during the next month?   No   Have you received any vaccinations in the past 4 weeks?   No     Immunization questionnaire was positive for at least one answer.  Ok per guidelines for TD .      Injection of TD given by Florin Wagner CMA. Patient instructed to remain in clinic for 15 minutes afterwards, and to report any adverse reactions.     Screening performed by Florin Wagner CMA on 6/5/2023 at 6:57 AM.

## 2023-07-09 ENCOUNTER — HEALTH MAINTENANCE LETTER (OUTPATIENT)
Age: 63
End: 2023-07-09

## 2023-08-09 ENCOUNTER — TELEPHONE (OUTPATIENT)
Dept: SLEEP MEDICINE | Facility: CLINIC | Age: 63
End: 2023-08-09
Payer: COMMERCIAL

## 2023-08-09 DIAGNOSIS — G47.33 OSA (OBSTRUCTIVE SLEEP APNEA): Primary | ICD-10-CM

## 2023-08-09 NOTE — TELEPHONE ENCOUNTER
Order/Referral Request    Who is requesting: patient    Orders being requested: new order for Cpap supplies     Reason service is needed/diagnosis: patient states was told that he needs a new order for supplies as his previous order has .    When are orders needed by: as able    Has this been discussed with Provider: No    Does patient have a preference on a Group/Provider/Facility?     Does patient have an appointment scheduled?: Yes: 08/15/2023    Where to send orders: Place orders within Epic    Could we send this information to you in [facility] or would you prefer to receive a phone call?:   Patient would prefer a phone call   Okay to leave a detailed message?: Yes at Cell number on file:    Telephone Information:   Mobile [phone]

## 2023-08-15 ENCOUNTER — OFFICE VISIT (OUTPATIENT)
Dept: SLEEP MEDICINE | Facility: CLINIC | Age: 63
End: 2023-08-15
Payer: COMMERCIAL

## 2023-08-15 VITALS
BODY MASS INDEX: 31.3 KG/M2 | DIASTOLIC BLOOD PRESSURE: 86 MMHG | WEIGHT: 185.19 LBS | OXYGEN SATURATION: 97 % | SYSTOLIC BLOOD PRESSURE: 117 MMHG | HEART RATE: 65 BPM

## 2023-08-15 DIAGNOSIS — G47.33 OSA (OBSTRUCTIVE SLEEP APNEA): Primary | ICD-10-CM

## 2023-08-15 PROCEDURE — 99214 OFFICE O/P EST MOD 30 MIN: CPT | Performed by: NURSE PRACTITIONER

## 2023-08-15 ASSESSMENT — SLEEP AND FATIGUE QUESTIONNAIRES
HOW LIKELY ARE YOU TO NOD OFF OR FALL ASLEEP IN A CAR, WHILE STOPPED FOR A FEW MINUTES IN TRAFFIC: WOULD NEVER DOZE
HOW LIKELY ARE YOU TO NOD OFF OR FALL ASLEEP WHILE SITTING AND TALKING TO SOMEONE: WOULD NEVER DOZE
HOW LIKELY ARE YOU TO NOD OFF OR FALL ASLEEP WHILE SITTING AND READING: WOULD NEVER DOZE
HOW LIKELY ARE YOU TO NOD OFF OR FALL ASLEEP WHILE SITTING QUIETLY AFTER LUNCH WITHOUT ALCOHOL: SLIGHT CHANCE OF DOZING
HOW LIKELY ARE YOU TO NOD OFF OR FALL ASLEEP WHILE WATCHING TV: SLIGHT CHANCE OF DOZING
HOW LIKELY ARE YOU TO NOD OFF OR FALL ASLEEP WHEN YOU ARE A PASSENGER IN A CAR FOR AN HOUR WITHOUT A BREAK: SLIGHT CHANCE OF DOZING
HOW LIKELY ARE YOU TO NOD OFF OR FALL ASLEEP WHILE SITTING INACTIVE IN A PUBLIC PLACE: WOULD NEVER DOZE
HOW LIKELY ARE YOU TO NOD OFF OR FALL ASLEEP WHILE LYING DOWN TO REST IN THE AFTERNOON WHEN CIRCUMSTANCES PERMIT: WOULD NEVER DOZE

## 2023-08-15 NOTE — PROGRESS NOTES
Sleep Apnea - Follow-up Visit:    Kwabena Richards is a 63-year-old patient who is here for follow-up of his severe obstructive sleep apnea with possible sustained hypoxemia and/or REM hypoventilation managed with auto titrate CPAP therapy.  His pressure settings currently are 12-17 cm H2O.  Patient's last visit to the sleep clinic for efficacy and compliance was on 12/11/2020.    Patient also has a past medical history notable for hypertension, mixed hyperlipidemia, diverticulosis, obesity, rhinitis, mild major depression, degenerative joint disease, osteoarthritis.    At this time, patient would like a prescription  for needed supplies and equipment.  Patient does enjoy using his CPAP and he feels the benefit from his daily use.    Prior Sleep Testing:  HST 12/4/2013 with AHI 36.7, je desat 75%, baseline SpO2 at ~90%) treated with auto-titrate CPAP 12-17 cm H2O    Impression/Plan:  Severe Obstructive Sleep Apnea   Will increase pressure from 12-17 cm H2O to 12-20 cm H2O.  The patient is unable to tolerate pressure increase, he is to reach out to me through [employer] or phone the clinic.  If he is unable to tolerate pressure change, plan is to do a titration study with patient.  Patient is agreeable, although not excited, with this plan.   Patient to return to clinic in 18 months, sooner if needed.         30 minutes spent with patient, all of which were spent face-to-face counseling, consulting, coordinating plan of care.      CC:  Shraddha Loyd,         History of Present Illness:  Chief Complaint   Patient presents with    CPAP Follow Up     8/15/2023 Again,Kwabena Richards comes in today for his annual follow-up of severe obstructive sleep apnea with possible sustained hypoxemia and or REM hypoventilation treated with auto titrate CPAP 12-17 cm H2O.  Plans to increase pressure to 12-20 cm H2O, if patient unable to tolerate the pressure change, titration PSG will be completed.  Complains of slight mask leakage  on the right side of his face, acknowledges he does not change his masks as often as he should.  Reviewed his download he presented today and correlated the leakage with the mask and the snoring and the increased AHI.  Reviewed proper intervals for changing and cleaning his equipment.    4/25/2019 Kwabena Richards comes in today for annual follow-up of severe RAÚL with possible sustained hypoxemia and/or REM hypoventilation (HST 12/4/2013 with AHI 36.7, je desat 75%, baseline SpO2 at ~90%) treated with auto-titrate CPAP 12-17 cm H2O. Titration PSG strongly advised, but patient declined.     1/14/2014 - CPAP compliance follow-up.  He returns today and feels the CPAP is working well, no snoring or stopping breathing. Uses it every night, only occasional issues with mask leak.  CPAP download from 12/14/2013 - 1/12/2014 on auto-titrate CPAP 5-15 cm H2O. Average daily usage of 7:26 and used >= 4 hours on 100% of nights. Pressure 90th%ile of 14.2 cm H2O. AHI 10.8.  A/P to change to auto-titrate 12-17 cm H2O, overnight oximetry on CPAP.     1/27/2014 - LADAN on CPAP.  SpO2 normal and stable on current CPAP settings.     2/28/2017 - Returns for follow-up to get new supplies.  Did not bring CPAP for download today.  He feels it continues to work well, wears on a nightly basis.  No residual snoring or apnea.  No new cardiopulmonary concerns.  A/P with recommendation for all-night PAP titration but declined by patient, recommended to bring CPAP for download.     Today - Returns for annual follow-up.  He feels he is sleeping well and has no concerns with his CPAP today.     CPAP download from 3/26/2019 - 4/24/2019 on auto-titrate 12-17 cm H2O.  Used 30/30 days, average usage of 8 hours 12 minutes.  Pressure mean 13 cm H2O, 90th%ile 14.6 cm H2O.  AHI 5.1.        Respironics  Auto-PAP 12-17 cmH2O 30 day usage data:    100% of days with > 4 hours of use.  0/30 days with no use.   Average use 8 hours, 59 minutes per day.   Average  leak 10 LPM.  Average % of night in large leak 0%.    CPAP 90% pressure 16.5 cm.   AHI 5.8 events per hour.         EPWORTH SLEEPINESS SCALE         12/10/2020    10:00 AM    [place] Sleepiness Scale ( BABAK Perez  [id number]<br>ESS - [place]/English - Final version - 21 Nov 07 - [facility] [place].)   [place] Score (Sleep) 1       INSOMNIA SEVERITY INDEX (JERMAN)          12/10/2020    10:00 AM   Insomnia Severity Index (JERMAN)   Difficulty falling asleep 0   Difficulty staying asleep 0   Problems waking up too early 1   How SATISFIED/DISSATISFIED are you with your CURRENT sleep pattern? 0   How NOTICEABLE to others do you think your sleep problem is in terms of impairing the quality of your life? 0   How WORRIED/DISTRESSED are you about your current sleep problem? 0   To what extent do you consider your sleep problem to INTERFERE with your daily functioning (e.g. daytime fatigue, mood, ability to function at work/daily chores, concentration, memory, mood, etc.) CURRENTLY? 0   JERMAN Total Score 1       Guidelines for Scoring/Interpretation:  Total score categories:  0-7 = No clinically significant insomnia   8-14 = Subthreshold insomnia   15-21 = Clinical insomnia (moderate severity)  22-28 = Clinical insomnia (severe)  Used via courtesy of www.[place].va.gov with permission from José Luis Hill PhD., [facility]      Past medical/surgical history, family history, social history, medications and allergies were reviewed.        Problem List:  Patient Active Problem List    Diagnosis Date Noted    Mild major depression (H) 12/28/2011     Priority: High     Better on cymbalta       DJD (degenerative joint disease) of knee 08/07/2009     Priority: High    Non morbid obesity due to excess calories 08/29/2016     Priority: Medium    Hyperlipidemia LDL goal <130 05/13/2014     Priority: Medium    RAÚL (obstructive sleep apnea) 12/05/2013     Priority: Medium     Severe RAÚL with possible sustained hypoxemia and/or REM  hypoventilation   - 12/4/2013 with AHI 36.7, je desat 75%, period at end of study suspicious for REM with significant increase in obstructive events and sustained hypoxemia.  Basal SpO2 also low-normal at ~90%.      Mixed hyperlipidemia 11/11/2013     Priority: Medium    Status post left partial knee replacement 10/16/2013     Priority: Medium     2009        Osteoarthritis of multiple joints 10/16/2013     Priority: Medium    Essential hypertension 12/28/2011     Priority: Medium    Rhinitis medicamentosa 06/28/2010     Priority: Medium    Diverticulosis of large intestine 02/10/2005     Priority: Medium     Colonoscopy 10/04  No polyps    Problem list name updated by automated process. Provider to review          /86   Pulse 65   Wt 84 kg (185 lb 3 oz)   SpO2 97%   BMI 31.30 kg/m      STU Junior, CNP  Sleep Medicine    This note was written with the assistance of the Dragon voice-dictation technology software. The final document, although reviewed, may contain errors. For corrections, please contact the office.

## 2023-08-15 NOTE — NURSING NOTE
Pressure changed [facility]     Return in about 2 year reminder created and to be send via Skout.       Edwina Lewis NIRAJ  [facility]

## 2023-08-15 NOTE — NURSING NOTE
"Chief Complaint   Patient presents with    CPAP Follow Up       Initial /86   Pulse 65   Wt 84 kg (185 lb 3 oz)   SpO2 97%   BMI 31.30 kg/m   Estimated body mass index is 31.3 kg/m  as calculated from the following:    Height as of 6/5/23: 1.638 m (5' 4.5\").    Weight as of this encounter: 84 kg (185 lb 3 oz).    Medication Reconciliation: complete    Neck circumference:     DME: [facility]      "

## 2023-10-18 ENCOUNTER — APPOINTMENT (OUTPATIENT)
Dept: ULTRASOUND IMAGING | Facility: CLINIC | Age: 63
End: 2023-10-18
Attending: FAMILY MEDICINE
Payer: COMMERCIAL

## 2023-10-18 ENCOUNTER — HOSPITAL ENCOUNTER (EMERGENCY)
Facility: CLINIC | Age: 63
Discharge: HOME | End: 2023-10-18
Attending: EMERGENCY MEDICINE | Admitting: EMERGENCY MEDICINE
Payer: COMMERCIAL

## 2023-10-18 VITALS
BODY MASS INDEX: 29.16 KG/M2 | RESPIRATION RATE: 18 BRPM | HEIGHT: 65 IN | HEART RATE: 78 BPM | SYSTOLIC BLOOD PRESSURE: 131 MMHG | DIASTOLIC BLOOD PRESSURE: 93 MMHG | WEIGHT: 175 LBS | TEMPERATURE: 98.6 F | OXYGEN SATURATION: 94 %

## 2023-10-18 DIAGNOSIS — M79.662 PAIN OF LEFT CALF: ICD-10-CM

## 2023-10-18 PROCEDURE — 99284 EMERGENCY DEPT VISIT MOD MDM: CPT | Mod: 25

## 2023-10-18 PROCEDURE — 99283 EMERGENCY DEPT VISIT LOW MDM: CPT | Performed by: EMERGENCY MEDICINE

## 2023-10-18 PROCEDURE — 93971 EXTREMITY STUDY: CPT | Mod: LT

## 2023-10-18 NOTE — DISCHARGE INSTRUCTIONS
Follow-up with your primary care clinic as needed.    Return to the emergency department for any problems.

## 2023-10-18 NOTE — ED PROVIDER NOTES
History     Chief Complaint   Patient presents with    Leg Pain     HPI  Kwabena Richards is a 63 year old male who presents to the emergency department to be evaluated for the possibility of a DVT in his left leg.  Over the past couple days he has felt as though he has a pulled muscle in his left calf.  Denies fevers, denies recent trauma, reports he has been working in his garage but does not recall any specific action that he feels would cause the discomfort.  No rashes or skin lesions.  He did see his chiropractor today who told him to get to the emergency department for an ultrasound.    Allergies:  Allergies   Allergen Reactions    Dilaudid [Hydromorphone Hcl] Itching    Lisinopril Cough              Problem List:    Patient Active Problem List    Diagnosis Date Noted    Mild major depression (H) 12/28/2011     Priority: High     Better on cymbalta       DJD (degenerative joint disease) of knee 08/07/2009     Priority: High    Non morbid obesity due to excess calories 08/29/2016     Priority: Medium    Hyperlipidemia LDL goal <130 05/13/2014     Priority: Medium    RAÚL (obstructive sleep apnea) 12/05/2013     Priority: Medium     Severe RAÚL with possible sustained hypoxemia and/or REM hypoventilation   - 12/4/2013 with AHI 36.7, je desat 75%, period at end of study suspicious for REM with significant increase in obstructive events and sustained hypoxemia.  Basal SpO2 also low-normal at ~90%.      Mixed hyperlipidemia 11/11/2013     Priority: Medium    Status post left partial knee replacement 10/16/2013     Priority: Medium     2009        Osteoarthritis of multiple joints 10/16/2013     Priority: Medium    Essential hypertension 12/28/2011     Priority: Medium    Rhinitis medicamentosa 06/28/2010     Priority: Medium    Diverticulosis of large intestine 02/10/2005     Priority: Medium     Colonoscopy 10/04  No polyps    Problem list name updated by automated process. Provider to review          Past  Medical History:    Past Medical History:   Diagnosis Date    Diverticulitis     Hematuria        Past Surgical History:    Past Surgical History:   Procedure Laterality Date    JOINT REPLACEMTN, KNEE RT/LT  2009    Joint Replacement knee /LT    SURGICAL HISTORY OF -   early     Right knee arthroscopy for meniscus tear    SURGICAL HISTORY OF -   3/12/2004    Right rotator cuff repair    SURGICAL HISTORY OF -   2004    Cystoscopy    SURGICAL HISTORY OF -       Removal of loose bodies, partial medial meniscectomy       Family History:    Family History   Problem Relation Age of Onset    Hypertension Mother     Arthritis Mother     Cancer Mother         skin    Cancer Father         liver, stomach    Cancer Maternal Grandmother     Circulatory Maternal Grandfather         blood clot    Cancer Paternal Grandmother     Cerebrovascular Disease Paternal Grandfather     Genitourinary Problems Son         blastoma       Social History:  Marital Status:   [2]  Social History     Tobacco Use    Smoking status: Former     Types: Cigarettes     Quit date: 1995     Years since quittin.3    Smokeless tobacco: Never   Vaping Use    Vaping Use: Never used   Substance Use Topics    Alcohol use: Yes     Alcohol/week: 4.0 standard drinks of alcohol     Types: 4 Standard drinks or equivalent per week     Comment: drinks once a week, only on Friday    Drug use: No        Medications:    ASPIRIN 81 MG OR CHEW  buPROPion (WELLBUTRIN XL) 150 MG 24 hr tablet  buPROPion (WELLBUTRIN XL) 300 MG 24 hr tablet  celecoxib (CELEBREX) 100 MG capsule  Cholecalciferol (VITAMIN D PO)  Coenzyme Q10 (CO Q 10) 100 MG CAPS  Diphenhydramine-APAP, sleep, (TYLENOL PM EXTRA STRENGTH PO)  DULoxetine (CYMBALTA) 60 MG capsule  fish oil-omega-3 fatty acids 1000 MG capsule  fluticasone (FLONASE) 50 MCG/ACT nasal spray  Garlic 2 MG CAPS  GLUCOSAMINE CHONDROITIN COMPLX PO  hydrochlorothiazide (HYDRODIURIL) 25 MG tablet  latanoprost  "(XALATAN) 0.005 % ophthalmic solution  losartan (COZAAR) 100 MG tablet  MULTIPLE VITAMIN OR  ORDER FOR DME  timolol maleate (TIMOPTIC) 0.5 % ophthalmic solution  valACYclovir (VALTREX) 1000 mg tablet  vitamin B complex with vitamin C (STRESS TAB) tablet  ZINC OR          Review of Systems   All other systems reviewed and are negative.      Physical Exam   BP: 126/87  Pulse: 90  Temp: 98.1  F (36.7  C)  Resp: 18  Height: 163.8 cm (5' 4.5\")  Weight: 79.4 kg (175 lb)  SpO2: 97 %      Physical Exam  Vitals and nursing note reviewed.   Constitutional:       General: He is not in acute distress.     Appearance: He is well-developed. He is not ill-appearing or toxic-appearing.   HENT:      Head: Normocephalic and atraumatic.   Eyes:      General: No scleral icterus.     Pupils: Pupils are equal, round, and reactive to light.   Cardiovascular:      Rate and Rhythm: Normal rate.   Pulmonary:      Effort: Pulmonary effort is normal. No respiratory distress.   Musculoskeletal:      Cervical back: Normal range of motion and neck supple.      Right lower leg: No edema.      Left lower leg: No edema.      Comments: No significant tenderness to palpation of musculature of lower leg on left.  Strongly palpable dorsalis pedis pulse.  Leg is warm with cap refill less than 2 seconds.  No rashes or skin lesions noted.   Skin:     General: Skin is warm and dry.      Coloration: Skin is not pale.      Findings: No rash.   Neurological:      Mental Status: He is alert and oriented to person, place, and time.      Sensory: No sensory deficit.   Psychiatric:         Behavior: Behavior normal.         ED Course                 Procedures                  Results for orders placed or performed during the hospital encounter of 10/18/23 (from the past 24 hour(s))   US Lower Extremity Venous Duplex Left    Narrative    EXAM: US LOWER EXTREMITY VENOUS DUPLEX LEFT  LOCATION: [facility]  DATE: 10/18/2023    INDICATION: " calf pain, swelling, lower extremity pain/swelling  COMPARISON: None.  TECHNIQUE: Venous Duplex ultrasound of the left lower extremity with and without compression, augmentation and duplex. Color flow and spectral Doppler with waveform analysis performed.    FINDINGS: Exam includes the common femoral, femoral, popliteal, and contralateral common femoral veins as well as segmentally visualized deep calf veins and greater saphenous vein.     LEFT: No deep vein thrombosis. No superficial thrombophlebitis. No popliteal cyst.      Impression    IMPRESSION:  1.  No deep venous thrombosis in the left lower extremity.       Medications - No data to display    Assessments & Plan (with Medical Decision Making)     I have reviewed the nursing notes.    I have reviewed the findings, diagnosis, plan and need for follow up with the patient.  This patient presented to the emergency department with concerns for possible blood clot in left leg.  Exam is not consistent with arterial insufficiency, zoster, skin or soft tissue infection.  No history of trauma.  Ultrasound demonstrates no evidence of DVT.  At this point time I am comfortable discharging him and having him follow-up with his primary clinic.  This was discussed with the patient and he was discharged in good condition.        New Prescriptions    No medications on file       Final diagnoses:   Pain of left calf       10/18/2023   [facility] EMERGENCY DEPT       Skyler Hunt MD  10/18/23 6882

## 2023-10-18 NOTE — ED TRIAGE NOTES
Pt sent here by his chiropractor with concerns for a DVT in his left calf. No hx of DVTs. No long car or plane rides.      Triage Assessment (Adult)       Row Name 10/18/23 1500          Triage Assessment    Airway WDL WDL        Respiratory WDL    Respiratory WDL WDL        Skin Circulation/Temperature WDL    Skin Circulation/Temperature WDL WDL        Cardiac WDL    Cardiac WDL WDL        Peripheral/Neurovascular WDL    Peripheral Neurovascular WDL WDL        Cognitive/Neuro/Behavioral WDL    Cognitive/Neuro/Behavioral WDL WDL

## 2024-01-18 ENCOUNTER — APPOINTMENT (OUTPATIENT)
Dept: URBAN - METROPOLITAN AREA CLINIC 241 | Age: 64
Setting detail: DERMATOLOGY
End: 2024-01-23

## 2024-01-18 DIAGNOSIS — L82.0 INFLAMED SEBORRHEIC KERATOSIS: ICD-10-CM

## 2024-01-18 DIAGNOSIS — D22 MELANOCYTIC NEVI: ICD-10-CM

## 2024-01-18 DIAGNOSIS — D49.2 NEOPLASM OF UNSPECIFIED BEHAVIOR OF BONE, SOFT TISSUE, AND SKIN: ICD-10-CM

## 2024-01-18 DIAGNOSIS — I78.8 OTHER DISEASES OF CAPILLARIES: ICD-10-CM

## 2024-01-18 DIAGNOSIS — L57.0 ACTINIC KERATOSIS: ICD-10-CM

## 2024-01-18 DIAGNOSIS — L82.1 OTHER SEBORRHEIC KERATOSIS: ICD-10-CM

## 2024-01-18 DIAGNOSIS — D18.0 HEMANGIOMA: ICD-10-CM

## 2024-01-18 DIAGNOSIS — L81.4 OTHER MELANIN HYPERPIGMENTATION: ICD-10-CM

## 2024-01-18 PROBLEM — D22.71 MELANOCYTIC NEVI OF RIGHT LOWER LIMB, INCLUDING HIP: Status: ACTIVE | Noted: 2024-01-18

## 2024-01-18 PROBLEM — D18.01 HEMANGIOMA OF SKIN AND SUBCUTANEOUS TISSUE: Status: ACTIVE | Noted: 2024-01-18

## 2024-01-18 PROBLEM — D22.61 MELANOCYTIC NEVI OF RIGHT UPPER LIMB, INCLUDING SHOULDER: Status: ACTIVE | Noted: 2024-01-18

## 2024-01-18 PROBLEM — D22.72 MELANOCYTIC NEVI OF LEFT LOWER LIMB, INCLUDING HIP: Status: ACTIVE | Noted: 2024-01-18

## 2024-01-18 PROBLEM — D22.5 MELANOCYTIC NEVI OF TRUNK: Status: ACTIVE | Noted: 2024-01-18

## 2024-01-18 PROBLEM — D22.62 MELANOCYTIC NEVI OF LEFT UPPER LIMB, INCLUDING SHOULDER: Status: ACTIVE | Noted: 2024-01-18

## 2024-01-18 PROCEDURE — OTHER COUNSELING: OTHER

## 2024-01-18 PROCEDURE — 99203 OFFICE O/P NEW LOW 30 MIN: CPT | Mod: 25

## 2024-01-18 PROCEDURE — 17110 DESTRUCT B9 LESION 1-14: CPT

## 2024-01-18 PROCEDURE — OTHER MIPS QUALITY: OTHER

## 2024-01-18 PROCEDURE — 11102 TANGNTL BX SKIN SINGLE LES: CPT | Mod: 59

## 2024-01-18 PROCEDURE — OTHER BIOPSY BY SHAVE METHOD: OTHER

## 2024-01-18 PROCEDURE — 17000 DESTRUCT PREMALG LESION: CPT | Mod: 59

## 2024-01-18 PROCEDURE — OTHER LIQUID NITROGEN: OTHER

## 2024-01-18 ASSESSMENT — LOCATION SIMPLE DESCRIPTION DERM
LOCATION SIMPLE: NOSE
LOCATION SIMPLE: RIGHT UPPER ARM
LOCATION SIMPLE: LEFT PRETIBIAL REGION
LOCATION SIMPLE: RIGHT FOREHEAD
LOCATION SIMPLE: LEFT TEMPLE
LOCATION SIMPLE: ABDOMEN
LOCATION SIMPLE: LEFT UPPER ARM
LOCATION SIMPLE: LEFT CHEEK
LOCATION SIMPLE: RIGHT UPPER BACK
LOCATION SIMPLE: RIGHT THIGH
LOCATION SIMPLE: CHEST
LOCATION SIMPLE: LEFT THIGH
LOCATION SIMPLE: RIGHT CHEEK
LOCATION SIMPLE: UPPER BACK

## 2024-01-18 ASSESSMENT — LOCATION ZONE DERM
LOCATION ZONE: NOSE
LOCATION ZONE: FACE
LOCATION ZONE: TRUNK
LOCATION ZONE: ARM
LOCATION ZONE: LEG

## 2024-01-18 ASSESSMENT — LOCATION DETAILED DESCRIPTION DERM
LOCATION DETAILED: NASAL DORSUM
LOCATION DETAILED: LEFT DISTAL PRETIBIAL REGION
LOCATION DETAILED: RIGHT INFERIOR UPPER BACK
LOCATION DETAILED: LEFT ANTERIOR PROXIMAL UPPER ARM
LOCATION DETAILED: RIGHT INFERIOR CENTRAL MALAR CHEEK
LOCATION DETAILED: RIGHT FOREHEAD
LOCATION DETAILED: RIGHT RIB CAGE
LOCATION DETAILED: EPIGASTRIC SKIN
LOCATION DETAILED: LEFT ANTERIOR DISTAL THIGH
LOCATION DETAILED: RIGHT ANTERIOR PROXIMAL UPPER ARM
LOCATION DETAILED: INFERIOR THORACIC SPINE
LOCATION DETAILED: LEFT INFERIOR CENTRAL MALAR CHEEK
LOCATION DETAILED: RIGHT ANTERIOR DISTAL THIGH
LOCATION DETAILED: RIGHT MID-UPPER BACK
LOCATION DETAILED: LEFT INFERIOR TEMPLE
LOCATION DETAILED: UPPER STERNUM

## 2024-01-18 NOTE — PROCEDURE: MIPS QUALITY
Detail Level: Generalized
Quality 110: Preventive Care And Screening: Influenza Immunization: Influenza Immunization previously received during influenza season
Quality 47: Advance Care Plan: Advance Care Planning discussed and documented in the medical record; patient did not wish or was not able to name a surrogate decision maker or provide an advance care plan.
Quality 226: Preventive Care And Screening: Tobacco Use: Screening And Cessation Intervention: Patient screened for tobacco use and is an ex/non-smoker
Quality 111:Pneumonia Vaccination Status For Older Adults: Patient received any pneumococcal conjugate or polysaccharide vaccine on or after their 60th birthday and before the end of the measurement period
Quality 431: Preventive Care And Screening: Unhealthy Alcohol Use - Screening: Patient not identified as an unhealthy alcohol user when screened for unhealthy alcohol use using a systematic screening method
Quality 130: Documentation Of Current Medications In The Medical Record: Current Medications Documented

## 2024-01-18 NOTE — PROCEDURE: BIOPSY BY SHAVE METHOD
Body Location Override (Optional - Billing Will Still Be Based On Selected Body Map Location If Applicable): Left medial lower leg
Detail Level: Detailed
Depth Of Biopsy: dermis
Was A Bandage Applied: Yes
Lab: -[id number]
Size Of Lesion In Cm: 1
X Size Of Lesion In Cm: 0.8
Biopsy Type: H and E
Biopsy Method: Dermablade
Anesthesia Type: 1% lidocaine with epinephrine
Anesthesia Volume In Cc: 0.5
Additional Anesthesia Volume In Cc (Will Not Render If 0): 0
Hemostasis: Drysol
Wound Care: Petrolatum
Dressing: bandage
Destruction After The Procedure: No
Type Of Destruction Used: Curettage
Curettage Text: The wound bed was treated with curettage after the biopsy was performed.
Cryotherapy Text: The wound bed was treated with cryotherapy after the biopsy was performed.
Electrodesiccation Text: The wound bed was treated with electrodesiccation after the biopsy was performed.
Electrodesiccation And Curettage Text: The wound bed was treated with electrodesiccation and curettage after the biopsy was performed.
Silver Nitrate Text: The wound bed was treated with silver nitrate after the biopsy was performed.
Consent: Written consent was obtained and risks were reviewed including but not limited to scarring, infection, bleeding, scabbing, incomplete removal, nerve damage and allergy to anesthesia.
Post-Care Instructions: I reviewed with the patient in detail post-care instructions. Patient is to keep the biopsy site dry overnight, and then apply bacitracin twice daily until healed. Patient may apply hydrogen peroxide soaks to remove any crusting.
Notification Instructions: Patient will be notified of biopsy results. However, patient instructed to call the office if not contacted within 2 weeks.
Billing Type: Third-Party Bill
Information: Selecting Yes will display possible errors in your note based on the variables you have selected. This validation is only offered as a suggestion for you. PLEASE NOTE THAT THE VALIDATION TEXT WILL BE REMOVED WHEN YOU FINALIZE YOUR NOTE. IF YOU WANT TO FAX A PRELIMINARY NOTE YOU WILL NEED TO TOGGLE THIS TO 'NO' IF YOU DO NOT WANT IT IN YOUR FAXED NOTE.

## 2024-01-18 NOTE — PROCEDURE: LIQUID NITROGEN
Consent: The patient's consent was obtained including but not limited to risks of crusting, scabbing, blistering, scarring, darker or lighter pigmentary change, recurrence, incomplete removal and infection.
Medical Necessity Information: It is in your best interest to select a reason for this procedure from the list below. All of these items fulfill various CMS LCD requirements except the new and changing color options.
Spray Paint Technique: No
Medical Necessity Clause: This procedure was medically necessary because the lesions that were treated were:
Show Applicator Variable?: Yes
Spray Paint Text: The liquid nitrogen was applied to the skin utilizing a spray paint frosting technique.
Detail Level: Detailed
Post-Care Instructions: I reviewed with the patient in detail post-care instructions. Patient is to wear sunprotection, and avoid picking at any of the treated lesions. Pt may apply Vaseline to crusted or scabbing areas.
Detail Level: Simple
Duration Of Freeze Thaw-Cycle (Seconds): 0

## 2024-05-17 DIAGNOSIS — I10 ESSENTIAL HYPERTENSION: ICD-10-CM

## 2024-05-17 DIAGNOSIS — F33.0 MAJOR DEPRESSIVE DISORDER, RECURRENT EPISODE, MILD (H): ICD-10-CM

## 2024-05-17 RX ORDER — HYDROCHLOROTHIAZIDE 25 MG/1
25 TABLET ORAL DAILY
Qty: 90 TABLET | Refills: 0 | Status: SHIPPED | OUTPATIENT
Start: 2024-05-17 | End: 2024-06-07

## 2024-05-17 RX ORDER — BUPROPION HYDROCHLORIDE 150 MG/1
450 TABLET ORAL EVERY MORNING
Qty: 270 TABLET | Refills: 0 | Status: SHIPPED | OUTPATIENT
Start: 2024-05-17 | End: 2024-06-07

## 2024-05-17 RX ORDER — DULOXETIN HYDROCHLORIDE 60 MG/1
CAPSULE, DELAYED RELEASE ORAL
Qty: 90 CAPSULE | Refills: 0 | Status: SHIPPED | OUTPATIENT
Start: 2024-05-17 | End: 2024-06-07

## 2024-05-17 NOTE — TELEPHONE ENCOUNTER
Refilled 1 time only, please call patient to schedule for Preventive on/after 6.6  Bárbara Steel MSN, RN  .

## 2024-05-25 ENCOUNTER — APPOINTMENT (OUTPATIENT)
Dept: GENERAL RADIOLOGY | Facility: CLINIC | Age: 64
End: 2024-05-25
Attending: PHYSICIAN ASSISTANT
Payer: MEDICARE

## 2024-05-25 ENCOUNTER — HOSPITAL ENCOUNTER (EMERGENCY)
Facility: CLINIC | Age: 64
Discharge: HOME | End: 2024-05-25
Attending: PHYSICIAN ASSISTANT | Admitting: PHYSICIAN ASSISTANT
Payer: MEDICARE

## 2024-05-25 VITALS
DIASTOLIC BLOOD PRESSURE: 74 MMHG | OXYGEN SATURATION: 95 % | HEART RATE: 101 BPM | SYSTOLIC BLOOD PRESSURE: 107 MMHG | TEMPERATURE: 98.7 F | RESPIRATION RATE: 16 BRPM

## 2024-05-25 DIAGNOSIS — M25.551 RIGHT HIP PAIN: ICD-10-CM
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
## 2024-05-25 DIAGNOSIS — M25.561 RIGHT KNEE PAIN: ICD-10-CM

## 2024-05-25 DIAGNOSIS — M54.50 LOW BACK PAIN: ICD-10-CM

## 2024-05-25 PROCEDURE — G0463 HOSPITAL OUTPT CLINIC VISIT: HCPCS

## 2024-05-25 PROCEDURE — 73560 X-RAY EXAM OF KNEE 1 OR 2: CPT | Mod: RT

## 2024-05-25 PROCEDURE — 72100 X-RAY EXAM L-S SPINE 2/3 VWS: CPT

## 2024-05-25 PROCEDURE — 73502 X-RAY EXAM HIP UNI 2-3 VIEWS: CPT

## 2024-05-25 PROCEDURE — 99214 OFFICE O/P EST MOD 30 MIN: CPT | Performed by: PHYSICIAN ASSISTANT

## 2024-05-25 RX ORDER — OXYCODONE HYDROCHLORIDE 5 MG/1
5 TABLET ORAL EVERY 6 HOURS PRN
Qty: 8 TABLET | Refills: 0 | Status: SHIPPED | OUTPATIENT
Start: 2024-05-25 | End: 2024-05-28

## 2024-05-25 ASSESSMENT — COLUMBIA-SUICIDE SEVERITY RATING SCALE - C-SSRS
2. HAVE YOU ACTUALLY HAD ANY THOUGHTS OF KILLING YOURSELF IN THE PAST MONTH?: NO
1. IN THE PAST MONTH, HAVE YOU WISHED YOU WERE DEAD OR WISHED YOU COULD GO TO SLEEP AND NOT WAKE UP?: NO
6. HAVE YOU EVER DONE ANYTHING, STARTED TO DO ANYTHING, OR PREPARED TO DO ANYTHING TO END YOUR LIFE?: NO

## 2024-05-25 ASSESSMENT — ACTIVITIES OF DAILY LIVING (ADL): ADLS_ACUITY_SCORE: 35

## 2024-05-25 ASSESSMENT — ENCOUNTER SYMPTOMS
CONSTITUTIONAL NEGATIVE: 1
BACK PAIN: 1

## 2024-05-25 NOTE — ED TRIAGE NOTES
PT AMBULATED VIA CRUTCH FROM HOME, REPORTS FALLING OUTSIDE FALLING ONTO FACE , HAVING RIGHT HIP AND KNEE PAIN SINCE THE INCIDENT.     INCIDENT HAPPENED 5/23/24    PT STATES HE TOOK OXYCODONE TODAY  AROUND 1000  PT DENIES TAKING BLOOD THINNING MEDICATION

## 2024-05-25 NOTE — ED PROVIDER NOTES
History     Chief Complaint   Patient presents with    Fall    Hip Pain     HPI  Kwabena Richards is a 64 year old male who presents to Urgent Care with complaints of right hip and knee pain since a fall yesterday.  Patient states he was trying to pull a vine that was wrapped around a tree when his feet got tangled and he fell, striking the right side of his face against the ground as well as landing on his right hip and twisting his right knee.  Patient denies loss of consciousness.  He initially had a headache but this has since improved.  Patient does not take blood thinners.  Denies vomiting.  Patient has had persistent right hip and knee pain and swelling since the fall.  Pain is worse with weightbearing.  Patient took an old Oxycodone he had prior to arrival.      Allergies:  Allergies   Allergen Reactions    Dilaudid [Hydromorphone Hcl] Itching    Lisinopril Cough              Problem List:    Patient Active Problem List    Diagnosis Date Noted    Mild major depression (H) 12/28/2011     Priority: High     Better on cymbalta       DJD (degenerative joint disease) of knee 08/07/2009     Priority: High    Non morbid obesity due to excess calories 08/29/2016     Priority: Medium    Hyperlipidemia LDL goal <130 05/13/2014     Priority: Medium    RAÚL (obstructive sleep apnea) 12/05/2013     Priority: Medium     Severe RAÚL with possible sustained hypoxemia and/or REM hypoventilation   - 12/4/2013 with AHI 36.7, je desat 75%, period at end of study suspicious for REM with significant increase in obstructive events and sustained hypoxemia.  Basal SpO2 also low-normal at ~90%.      Mixed hyperlipidemia 11/11/2013     Priority: Medium    Status post left partial knee replacement 10/16/2013     Priority: Medium     2009        Osteoarthritis of multiple joints 10/16/2013     Priority: Medium    Essential hypertension 12/28/2011     Priority: Medium    Rhinitis medicamentosa 06/28/2010     Priority: Medium     Diverticulosis of large intestine 02/10/2005     Priority: Medium     Colonoscopy 10/04  No polyps    Problem list name updated by automated process. Provider to review          Past Medical History:    Past Medical History:   Diagnosis Date    Diverticulitis     Hematuria        Past Surgical History:    Past Surgical History:   Procedure Laterality Date    JOINT REPLACEMTN, KNEE RT/LT  2009    Joint Replacement knee /LT    SURGICAL HISTORY OF -   early     Right knee arthroscopy for meniscus tear    SURGICAL HISTORY OF -   3/12/2004    Right rotator cuff repair    SURGICAL HISTORY OF -   2004    Cystoscopy    SURGICAL HISTORY OF -       Removal of loose bodies, partial medial meniscectomy       Family History:    Family History   Problem Relation Age of Onset    Hypertension Mother     Arthritis Mother     Cancer Mother         skin    Cancer Father         liver, stomach    Cancer Maternal Grandmother     Circulatory Maternal Grandfather         blood clot    Cancer Paternal Grandmother     Cerebrovascular Disease Paternal Grandfather     Genitourinary Problems Son         blastoma       Social History:  Marital Status:   [2]  Social History     Tobacco Use    Smoking status: Former     Current packs/day: 0.00     Types: Cigarettes     Quit date: 1995     Years since quittin.9    Smokeless tobacco: Never   Vaping Use    Vaping status: Never Used   Substance Use Topics    Alcohol use: Yes     Alcohol/week: 4.0 standard drinks of alcohol     Types: 4 Standard drinks or equivalent per week     Comment: drinks once a week, only on Friday    Drug use: No        Medications:    buPROPion (WELLBUTRIN XL) 150 MG 24 hr tablet  celecoxib (CELEBREX) 100 MG capsule  Coenzyme Q10 (CO Q 10) 100 MG CAPS  DULoxetine (CYMBALTA) 60 MG capsule  hydrochlorothiazide (HYDRODIURIL) 25 MG tablet  losartan (COZAAR) 100 MG tablet  oxyCODONE (ROXICODONE) 5 MG tablet  ASPIRIN 81 MG OR CHEW  buPROPion  (WELLBUTRIN XL) 300 MG 24 hr tablet  Cholecalciferol (VITAMIN D PO)  Diphenhydramine-APAP, sleep, (TYLENOL PM EXTRA STRENGTH PO)  fish oil-omega-3 fatty acids 1000 MG capsule  fluticasone (FLONASE) 50 MCG/ACT nasal spray  Garlic 2 MG CAPS  GLUCOSAMINE CHONDROITIN COMPLX PO  latanoprost (XALATAN) 0.005 % ophthalmic solution  MULTIPLE VITAMIN OR  ORDER FOR DME  timolol maleate (TIMOPTIC) 0.5 % ophthalmic solution  valACYclovir (VALTREX) 1000 mg tablet  vitamin B complex with vitamin C (STRESS TAB) tablet  ZINC OR          Review of Systems   Constitutional: Negative.    Musculoskeletal:  Positive for back pain.        Right hip and knee pain   All other systems reviewed and are negative.      Physical Exam   /74   Pulse 101   Temp 98.7  F (37.1  C) (Tympanic)   Resp 16   SpO2 95%       Physical Exam  Constitutional:       General: He is not in acute distress.     Appearance: Normal appearance. He is well-developed. He is not ill-appearing, toxic-appearing or diaphoretic.   HENT:      Head: Normocephalic and atraumatic.   Eyes:      Extraocular Movements: Extraocular movements intact.      Conjunctiva/sclera: Conjunctivae normal.      Pupils: Pupils are equal, round, and reactive to light.   Cardiovascular:      Pulses: Normal pulses.   Pulmonary:      Effort: Pulmonary effort is normal.   Musculoskeletal:         General: Normal range of motion.      Cervical back: Normal and neck supple. No tenderness or bony tenderness. No pain with movement. Normal range of motion.      Thoracic back: Normal. No swelling, spasms, tenderness or bony tenderness. Normal range of motion.      Lumbar back: Tenderness present. No swelling, spasms or bony tenderness. Normal range of motion.      Right hip: Tenderness and bony tenderness (Lateral and anterior) present. No deformity, lacerations or crepitus. Normal range of motion. Normal strength.      Right upper leg: Normal. No swelling, deformity, tenderness or bony  tenderness.      Right knee: Swelling and effusion present. No erythema, ecchymosis, bony tenderness or crepitus. Normal range of motion. No tenderness. Normal alignment.      Right lower leg: Normal. No swelling or bony tenderness.      Right ankle: Normal. No swelling, deformity or ecchymosis. No tenderness. Normal range of motion.   Skin:     General: Skin is warm and dry.      Capillary Refill: Capillary refill takes less than 2 seconds.   Neurological:      General: No focal deficit present.      Mental Status: He is alert.      GCS: GCS eye subscore is 4. GCS verbal subscore is 5. GCS motor subscore is 6.      Sensory: Sensation is intact.      Motor: Motor function is intact.      Comments: Gait is steady but guarded   Psychiatric:         Behavior: Behavior is cooperative.         ED Course        Procedures      Results for orders placed or performed during the hospital encounter of 05/25/24 (from the past 24 hour(s))   Pelvis XR w/ unilateral hip right    Narrative    EXAM: XR PELVIS AND HIP RIGHT 1 VIEW  LOCATION: [facility]  DATE: 5/25/2024    INDICATION: right hip pain after a fall yesterday  COMPARISON: None.      Impression    IMPRESSION: No acute fracture or dislocation. Moderate to severe degenerative changes of the left hip. Additional degenerative lumbar spine and sacroiliac joints.   XR Knee Right 1/2 Views    Narrative    EXAM: XR KNEE RIGHT 1/2 VIEWS  LOCATION: [facility]  DATE: 5/25/2024    INDICATION: right knee pain and diffuse swelling after a fall yesterday  COMPARISON: None.      Impression    IMPRESSION: Moderate to severe tricompartmental osteoarthrosis. Large joint effusion. While there is no definite associated acute fracture, MRI could be obtained if there is clinical concern for superimposed acute injury. Intra-articular bodies.   Lumbar spine XR, 2-3 views    Narrative    EXAM: XR LUMBAR SPINE 2/3 VIEWS  LOCATION: [facility]  DATE: 5/25/2024    INDICATION: Fall with low back pain.  COMPARISON: None.      Impression    IMPRESSION: Moderate mid lumbar dextroscoliosis with loss of the normal lumbar lordosis and 6 mm grade 1 anterolisthesis at L5-S1, likely degenerative. No definite pars interarticularis defect. No acute displaced fracture or compression deformity,   although assessment of the vertebral body heights on the lateral radiograph is somewhat limited by scoliosis. Moderate to advanced multilevel interbody degenerative change, worst at L1-L3 and L4-S1. Multilevel facet arthrosis, worst and advanced at   L4-S1.    Mild bilateral sacroiliac joint and pubic symphysis degenerative change. Severe left and moderate right hip arthrosis. Mild calcified intra-abdominal atherosclerosis.       Medications - No data to display    Assessments & Plan (with Medical Decision Making)     Pt is a 64 year old male who presents to Urgent Care with complaints of right hip and knee pain since a fall yesterday.  Patient states he was trying to pull a vine that was wrapped around a tree when his feet got tangled and he fell, striking the right side of his face against the ground as well as landing on his right hip and twisting his right knee.  Patient denies loss of consciousness.  He initially had a headache but this has since improved.  Patient does not take blood thinners.  Denies vomiting.  Patient has had persistent right hip and knee pain and swelling since the fall.  Pain is worse with weightbearing.  Patient took an old Oxycodone he had prior to arrival.    Pt is afebrile on arrival.  Exam as above.  X-rays of right knee, pelvis/right hip, and lumbar spine were negative for fracture or acute pathology.  Degenerative changes noted throughout the hip, knee, and spine.  Large joint effusion noted in the right knee.  Discussed results with patient.  Recommended close follow-up with orthopedics as there could be possible  ligamentous injury in the knee.  Patient states his knee has been swollen prior to this injury.  Encouraged symptomatic treatments at home.  Return precautions were reviewed.  Hand-outs were provided.    Patient was sent with Oxycodone and was instructed to follow-up with PCP for continued care and management.  He is to return to the ED for persistent and/or worsening symptoms.  Patient expressed understanding of the diagnosis and plan and was discharged home in good condition.    I have reviewed the nursing notes.    I have reviewed the findings, diagnosis, plan and need for follow up with the patient.    Discharge Medication List as of 5/25/2024  1:27 PM        START taking these medications    Details   oxyCODONE (ROXICODONE) 5 MG tablet Take 1 tablet (5 mg) by mouth every 6 hours as needed for pain, Disp-8 tablet, R-0, Local Print             Final diagnoses:   Fall, initial encounter   Right hip pain   Right knee pain   Low back pain       5/25/2024   [facility] EMERGENCY DEPT      Disclaimer:  This note consists of symbols derived from keyboarding, dictation and/or voice recognition software.  As a result, there may be errors in the script that have gone undetected.  Please consider this when interpreting information found in this chart.     Noris Montaño PA-C  05/25/24 1724       Noris Montaño PA-C  05/25/24 [id number]       Noris Montaño PA-C  05/25/24 0810

## 2024-05-28 DIAGNOSIS — M17.0 PRIMARY OSTEOARTHRITIS OF BOTH KNEES: ICD-10-CM

## 2024-05-31 RX ORDER — CELECOXIB 100 MG/1
200 CAPSULE ORAL DAILY
Qty: 60 CAPSULE | Refills: 0 | Status: SHIPPED | OUTPATIENT
Start: 2024-05-31 | End: 2024-06-07

## 2024-06-07 ENCOUNTER — OFFICE VISIT (OUTPATIENT)
Dept: FAMILY MEDICINE | Facility: CLINIC | Age: 64
End: 2024-06-07
Payer: MEDICARE

## 2024-06-07 VITALS
BODY MASS INDEX: 31.07 KG/M2 | HEART RATE: 78 BPM | WEIGHT: 182 LBS | HEIGHT: 64 IN | OXYGEN SATURATION: 97 % | TEMPERATURE: 98.5 F | SYSTOLIC BLOOD PRESSURE: 118 MMHG | RESPIRATION RATE: 12 BRPM | DIASTOLIC BLOOD PRESSURE: 82 MMHG

## 2024-06-07 DIAGNOSIS — I10 ESSENTIAL HYPERTENSION: ICD-10-CM

## 2024-06-07 DIAGNOSIS — H69.92 DYSFUNCTION OF LEFT EUSTACHIAN TUBE: ICD-10-CM

## 2024-06-07 DIAGNOSIS — Z00.00 ROUTINE GENERAL MEDICAL EXAMINATION AT A HEALTH CARE FACILITY: Primary | ICD-10-CM

## 2024-06-07 DIAGNOSIS — Z12.11 SCREEN FOR COLON CANCER: ICD-10-CM

## 2024-06-07 DIAGNOSIS — M17.0 PRIMARY OSTEOARTHRITIS OF BOTH KNEES: ICD-10-CM

## 2024-06-07 DIAGNOSIS — F33.0 MAJOR DEPRESSIVE DISORDER, RECURRENT EPISODE, MILD (H): ICD-10-CM

## 2024-06-07 DIAGNOSIS — Z12.5 SCREENING FOR PROSTATE CANCER: ICD-10-CM

## 2024-06-07 DIAGNOSIS — G47.33 OSA (OBSTRUCTIVE SLEEP APNEA): ICD-10-CM

## 2024-06-07 DIAGNOSIS — E78.5 HYPERLIPIDEMIA LDL GOAL <130: ICD-10-CM

## 2024-06-07 LAB
ANION GAP SERPL CALCULATED.3IONS-SCNC: 14 MMOL/L (ref 7–15)
BUN SERPL-MCNC: 18.8 MG/DL (ref 8–23)
CALCIUM SERPL-MCNC: 9.5 MG/DL (ref 8.8–10.2)
CHLORIDE SERPL-SCNC: 103 MMOL/L (ref 98–107)
CHOLEST SERPL-MCNC: 163 MG/DL
CREAT SERPL-MCNC: 1.12 MG/DL (ref 0.67–1.17)
DEPRECATED HCO3 PLAS-SCNC: 21 MMOL/L (ref 22–29)
EGFRCR SERPLBLD CKD-EPI 2021: 73 ML/MIN/1.73M2
FASTING STATUS PATIENT QL REPORTED: NO
FASTING STATUS PATIENT QL REPORTED: NO
GLUCOSE SERPL-MCNC: 105 MG/DL (ref 70–99)
HDLC SERPL-MCNC: 51 MG/DL
LDLC SERPL CALC-MCNC: 75 MG/DL
NONHDLC SERPL-MCNC: 112 MG/DL
POTASSIUM SERPL-SCNC: 4.1 MMOL/L (ref 3.4–5.3)
PSA SERPL DL<=0.01 NG/ML-MCNC: 0.98 NG/ML (ref 0–4.5)
SODIUM SERPL-SCNC: 138 MMOL/L (ref 135–145)
TRIGL SERPL-MCNC: 183 MG/DL

## 2024-06-07 PROCEDURE — 99214 OFFICE O/P EST MOD 30 MIN: CPT | Mod: 25 | Performed by: NURSE PRACTITIONER

## 2024-06-07 PROCEDURE — 91320 SARSCV2 VAC 30MCG TRS-SUC IM: CPT | Performed by: NURSE PRACTITIONER

## 2024-06-07 PROCEDURE — 80048 BASIC METABOLIC PNL TOTAL CA: CPT | Performed by: NURSE PRACTITIONER

## 2024-06-07 PROCEDURE — 90480 ADMN SARSCOV2 VAC 1/ONLY CMP: CPT | Performed by: NURSE PRACTITIONER

## 2024-06-07 PROCEDURE — 36415 COLL VENOUS BLD VENIPUNCTURE: CPT | Performed by: NURSE PRACTITIONER

## 2024-06-07 PROCEDURE — 99397 PER PM REEVAL EST PAT 65+ YR: CPT | Mod: 25 | Performed by: NURSE PRACTITIONER

## 2024-06-07 PROCEDURE — 90677 PCV20 VACCINE IM: CPT | Performed by: NURSE PRACTITIONER

## 2024-06-07 PROCEDURE — 90471 IMMUNIZATION ADMIN: CPT | Performed by: NURSE PRACTITIONER

## 2024-06-07 PROCEDURE — G0103 PSA SCREENING: HCPCS | Performed by: NURSE PRACTITIONER

## 2024-06-07 PROCEDURE — 80061 LIPID PANEL: CPT | Performed by: NURSE PRACTITIONER

## 2024-06-07 RX ORDER — LOSARTAN POTASSIUM 100 MG/1
100 TABLET ORAL DAILY
Qty: 90 TABLET | Refills: 3 | Status: SHIPPED | OUTPATIENT
Start: 2024-06-07

## 2024-06-07 RX ORDER — CELECOXIB 100 MG/1
200 CAPSULE ORAL DAILY
Qty: 180 CAPSULE | Refills: 3 | Status: SHIPPED | OUTPATIENT
Start: 2024-06-07

## 2024-06-07 RX ORDER — HYDROCHLOROTHIAZIDE 25 MG/1
25 TABLET ORAL DAILY
Qty: 90 TABLET | Refills: 3 | Status: SHIPPED | OUTPATIENT
Start: 2024-06-07

## 2024-06-07 RX ORDER — BUPROPION HYDROCHLORIDE 150 MG/1
450 TABLET ORAL EVERY MORNING
Qty: 270 TABLET | Refills: 3 | Status: SHIPPED | OUTPATIENT
Start: 2024-06-07

## 2024-06-07 RX ORDER — FLUTICASONE PROPIONATE 50 MCG
1 SPRAY, SUSPENSION (ML) NASAL DAILY
Qty: 16 ML | Refills: 11 | Status: SHIPPED | OUTPATIENT
Start: 2024-06-07

## 2024-06-07 RX ORDER — DULOXETIN HYDROCHLORIDE 60 MG/1
CAPSULE, DELAYED RELEASE ORAL
Qty: 90 CAPSULE | Refills: 3 | Status: SHIPPED | OUTPATIENT
Start: 2024-06-07

## 2024-06-07 SDOH — HEALTH STABILITY: PHYSICAL HEALTH: ON AVERAGE, HOW MANY MINUTES DO YOU ENGAGE IN EXERCISE AT THIS LEVEL?: PATIENT DECLINED

## 2024-06-07 SDOH — HEALTH STABILITY: PHYSICAL HEALTH
ON AVERAGE, HOW MANY DAYS PER WEEK DO YOU ENGAGE IN MODERATE TO STRENUOUS EXERCISE (LIKE A BRISK WALK)?: PATIENT DECLINED

## 2024-06-07 ASSESSMENT — PATIENT HEALTH QUESTIONNAIRE - PHQ9
SUM OF ALL RESPONSES TO PHQ QUESTIONS 1-9: 1
10. IF YOU CHECKED OFF ANY PROBLEMS, HOW DIFFICULT HAVE THESE PROBLEMS MADE IT FOR YOU TO DO YOUR WORK, TAKE CARE OF THINGS AT HOME, OR GET ALONG WITH OTHER PEOPLE: NOT DIFFICULT AT ALL
SUM OF ALL RESPONSES TO PHQ QUESTIONS 1-9: 1

## 2024-06-07 ASSESSMENT — SOCIAL DETERMINANTS OF HEALTH (SDOH): HOW OFTEN DO YOU GET TOGETHER WITH FRIENDS OR RELATIVES?: ONCE A WEEK

## 2024-06-07 ASSESSMENT — PAIN SCALES - GENERAL: PAINLEVEL: SEVERE PAIN (6)

## 2024-06-07 NOTE — PATIENT INSTRUCTIONS
"Preventive Care Advice   This is general advice we often give to help people stay healthy. Your care team may have specific advice just for you. Please talk to your care team about your own preventive care needs.  Lifestyle  Exercise at least 150 minutes each week (30 minutes a day, 5 days a week).  Do muscle strengthening activities 2 days a week. These help control your weight and prevent disease.  No smoking.  Wear sunscreen to prevent skin cancer.  Have your home tested for radon every 2 to 5 years. Radon is a colorless, odorless gas that can harm your lungs. To learn more, go to www.health.[facility].mn. and search for \"Radon in Homes.\"  Keep guns unloaded and locked up in a safe place like a safe or gun vault, or, use a gun lock and hide the keys. Always lock away bullets separately. To learn more, visit [employer].[place].gov and search for \"safe gun storage.\"  Nutrition  Eat 5 or more servings of fruits and vegetables each day.  Try wheat bread, brown rice and whole grain pasta (instead of white bread, rice, and pasta).  Get enough calcium and vitamin D. Check the label on foods and aim for 100% of the RDA (recommended daily allowance).  Regular exams  Have a dental exam and cleaning every 6 months.  See your health care team every year to talk about:  Any changes in your health.  Any medicines your care team has prescribed.  Preventive care, family planning, and ways to prevent chronic diseases.  Shots (vaccines)   HPV shots (up to age 26), if you've never had them before.  Hepatitis B shots (up to age 59), if you've never had them before.  COVID-19 shot: Get this shot when it's due.  Flu shot: Get a flu shot every year.  Tetanus shot: Get a tetanus shot every 10 years.  Pneumococcal, hepatitis A, and RSV shots: Ask your care team if you need these based on your risk.  Shingles shot (for age 50 and up).  General health tests  Diabetes screening:  Starting at age 35, Get screened for diabetes at least every 3 years.  If " you are younger than age 35, ask your care team if you should be screened for diabetes.  Cholesterol test: At age 39, start having a cholesterol test every 5 years, or more often if advised.  Bone density scan (DEXA): At age 50, ask your care team if you should have this scan for osteoporosis (brittle bones).  Hepatitis C: Get tested at least once in your life.  Abdominal aortic aneurysm screening: Talk to your doctor about having this screening if you:  Have ever smoked; and  Are biologically male; and  Are between the ages of 65 and 75.  STIs (sexually transmitted infections)  Before age 24: Ask your care team if you should be screened for STIs.  After age 24: Get screened for STIs if you're at risk. You are at risk for STIs (including HIV) if:  You are sexually active with more than one person.  You don't use condoms every time.  You or a partner was diagnosed with a sexually transmitted infection.  If you are at risk for HIV, ask about PrEP medicine to prevent HIV.  Get tested for HIV at least once in your life, whether you are at risk for HIV or not.  Cancer screening tests  Cervical cancer screening: If you have a cervix, begin getting regular cervical cancer screening tests at age 21. Most people who have regular screenings with normal results can stop after age 65. Talk about this with your provider.  Breast cancer scan (mammogram): If you've ever had breasts, begin having regular mammograms starting at age 40. This is a scan to check for breast cancer.  Colon cancer screening: It is important to start screening for colon cancer at age 45.  Have a colonoscopy test every 10 years (or more often if you're at risk) Or, ask your provider about stool tests like a FIT test every year or Cologuard test every 3 years.  To learn more about your testing options, visit: www.[employer]884407.pdf.  For help making a decision, visit: jeison/fy59431.  Prostate cancer screening test: If you have a prostate and are age 55  to 69, ask your provider if you would benefit from a yearly prostate cancer screening test.  Lung cancer screening: If you are a current or former smoker age 50 to 80, ask your care team if ongoing lung cancer screenings are right for you.  For informational purposes only. Not to replace the advice of your health care provider. Copyright   2023 [employer]. All rights reserved. Clinically reviewed by the [facility] Transitions Program. [employer] [id number] - REV 04/24.    Preventing Falls: Care Instructions  Injuries and health problems such as trouble walking or poor eyesight can increase your risk of falling. So can some medicines. But there are things you can do to help prevent falls. You can exercise to get stronger. You can also arrange your home to make it safer.    Talk to your doctor about the medicines you take. Ask if any of them increase the risk of falls and whether they can be changed or stopped.   Try to exercise regularly. It can help improve your strength and balance. This can help lower your risk of falling.     Practice fall safety and prevention.    Wear low-heeled shoes that fit well and give your feet good support. Talk to your doctor if you have foot problems that make this hard.  Carry a cellphone or wear a medical alert device that you can use to call for help.  Use stepladders instead of chairs to reach high objects. Don't climb if you're at risk for falls. Ask for help, if needed.  Wear the correct eyeglasses, if you need them.    Make your home safer.    Remove rugs, cords, clutter, and furniture from walkways.  Keep your house well lit. Use night-lights in hallways and bathrooms.  Install and use sturdy handrails on stairways.  Wear nonskid footwear, even inside. Don't walk barefoot or in socks without shoes.    Be safe outside.    Use handrails, curb cuts, and ramps whenever possible.  Keep your hands free by using a shoulder bag or backpack.  Try to walk in well-lit  "areas. Watch out for uneven ground, changes in pavement, and debris.  Be careful in the winter. Walk on the grass or gravel when sidewalks are slippery. Use de-icer on steps and walkways. Add non-slip devices to shoes.    Put grab bars and nonskid mats in your shower or tub and near the toilet. Try to use a shower chair or bath bench when bathing.   Get into a tub or shower by putting in your weaker leg first. Get out with your strong side first. Have a phone or medical alert device in the bathroom with you.   Where can you learn more?  Go to https://www.[employer].net/patiented  Enter G117 in the search box to learn more about \"Preventing Falls: Care Instructions.\"  Current as of: July 17, 2023               Content Version: 14.0    [id number] [employer].   Care instructions adapted under license by your healthcare professional. If you have questions about a medical condition or this instruction, always ask your healthcare professional. [employer] disclaims any warranty or liability for your use of this information.      Hearing Loss: Care Instructions  Overview     Hearing loss is a sudden or slow decrease in how well you hear. It can range from slight to profound. Permanent hearing loss can occur with aging. It also can happen when you are exposed long-term to loud noise. Examples include listening to loud music, riding motorcycles, or being around other loud machines.  Hearing loss can affect your work and home life. It can make you feel lonely or depressed. You may feel that you have lost your independence. But hearing aids and other devices can help you hear better and feel connected to others.  Follow-up care is a key part of your treatment and safety. Be sure to make and go to all appointments, and call your doctor if you are having problems. It's also a good idea to know your test results and keep a list of the medicines you take.  How can you care for yourself at home?  Avoid " loud noises whenever possible. This helps keep your hearing from getting worse.  Always wear hearing protection around loud noises.  Wear a hearing aid as directed.  A professional can help you pick a hearing aid that will work best for you.  You can also get hearing aids over the counter for mild to moderate hearing loss.  Have hearing tests as your doctor suggests. They can show whether your hearing has changed. Your hearing aid may need to be adjusted.  Use other devices as needed. These may include:  Telephone amplifiers and hearing aids that can connect to a television, stereo, radio, or microphone.  Devices that use lights or vibrations. These alert you to the doorbell, a ringing telephone, or a baby monitor.  Television closed-captioning. This shows the words at the bottom of the screen. Most new TVs can do this.  TTY (text telephone). This lets you type messages back and forth on the telephone instead of talking or listening. These devices are also called TDD. When messages are typed on the keyboard, they are sent over the phone line to a receiving TTY. The message is shown on a monitor.  Use text messaging, social media, and email if it is hard for you to communicate by telephone.  Try to learn a listening technique called speechreading. It is not lipreading. You pay attention to people's gestures, expressions, posture, and tone of voice. These clues can help you understand what a person is saying. Face the person you are talking to, and have them face you. Make sure the lighting is good. You need to see the other person's face clearly.  Think about counseling if you need help to adjust to your hearing loss.  When should you call for help?  Watch closely for changes in your health, and be sure to contact your doctor if:    You think your hearing is getting worse.     You have new symptoms, such as dizziness or nausea.   Where can you learn more?  Go to https://www.healthwise.net/patiented  Enter R798 in the  "search box to learn more about \"Hearing Loss: Care Instructions.\"  Current as of: September 27, 2023               Content Version: 14.0    [id number] [employer].   Care instructions adapted under license by your healthcare professional. If you have questions about a medical condition or this instruction, always ask your healthcare professional. [employer] disclaims any warranty or liability for your use of this information.      "

## 2024-06-07 NOTE — PROGRESS NOTES
"Preventive Care Visit  [facility]  STU Evangelista CNP, Family Medicine  Jun 7, 2024        Assessment & Plan     Routine general medical examination at a health care facility      Major depressive disorder, recurrent episode, mild (H24)  Well controlled.  - buPROPion (WELLBUTRIN XL) 150 MG 24 hr tablet; Take 3 tablets (450 mg) by mouth every morning  - DULoxetine (CYMBALTA) 60 MG capsule; TAKE 1 CAPSULE BY MOUTH EVERY DAY  - OFFICE/OUTPT VISIT,EST,LEVL IV    Primary osteoarthritis of both knees  Well controlled.  - celecoxib (CELEBREX) 100 MG capsule; Take 2 capsules (200 mg) by mouth daily  - OFFICE/OUTPT VISIT,EST,LEVL IV    Essential hypertension  Well controlled.  - hydrochlorothiazide (HYDRODIURIL) 25 MG tablet; Take 1 tablet (25 mg) by mouth daily  - losartan (COZAAR) 100 MG tablet; Take 1 tablet (100 mg) by mouth daily  - BASIC METABOLIC PANEL; Future  - OFFICE/OUTPT VISIT,EST,LEVL IV    Hyperlipidemia LDL goal <130  Well controlled.  - Lipid panel reflex to direct LDL Non-fasting; Future  - OFFICE/OUTPT VISIT,EST,LEVL IV    Dysfunction of left eustachian tube  Well controlled.  - fluticasone (FLONASE) 50 MCG/ACT nasal spray; Spray 1 spray into both nostrils daily  - OFFICE/OUTPT VISIT,EST,LEVL IV    Screen for colon cancer  - Fecal colorectal cancer screen FIT - Future (S+30); Future    Screening for prostate cancer  - PSA, screen; Future      RAÚL  Patients reports using CPAP every night and that it is beneficial for his sleep apnea symptoms.        BMI  Estimated body mass index is 31.24 kg/m  as calculated from the following:    Height as of this encounter: 1.626 m (5' 4\").    Weight as of this encounter: 82.6 kg (182 lb).   Weight management plan: Discussed healthy diet and exercise guidelines    Counseling  Appropriate preventive services were discussed with this patient, including applicable screening as appropriate for fall prevention, nutrition, physical activity, " Tobacco-use cessation, weight loss and cognition.  Checklist reviewing preventive services available has been given to the patient.  Reviewed patient's diet, addressing concerns and/or questions.   He is at risk for psychosocial distress and has been provided with information to reduce risk.   The patient was provided with written information regarding signs of hearing loss.     The risks, benefits and treatment options of prescribed medications or other treatments have been discussed with the patient. The patient verbalized their understanding and should call or follow up if no improvement or if they develop further problems.  Shraddha Loyd, RAYNE              Subjective   Kwabena is a 64 year old, presenting for the following:  Wellness Visit (Add Refill medications.) and ER F/U        6/7/2024     1:47 PM   Additional Questions   Roomed by Sonal ROCHA         6/7/2024     1:47 PM   Patient Reported Additional Medications   Patient reports taking the following new medications none        Health Care Directive  Patient does not have a Health Care Directive or Living Will: Patient states has Advance Directive and will bring in a copy to clinic.    HPI    VISION   Wears glasses: worn for testing  Tool used: Borges   Right eye:        10/10 (20/20)  Left eye:          10/10 (20/20)  Visual Acuity: Pass        Hypertension Follow-up    Do you check your blood pressure regularly outside of the clinic? No   Are you following a low salt diet? No- doesn't add salt  Are your blood pressures ever more than 140 on the top number (systolic) OR more   than 90 on the bottom number (diastolic), for example 140/90?  Doesn't check    BP Readings from Last 3 Encounters:   06/07/24 118/82   05/25/24 107/74   10/18/23 (!) 131/93           Depression   How are you doing with your depression since your last visit? Up and down./ stable  Are you having other symptoms that might be associated with depression? No  Have you had a significant life  event?  No   Are you feeling anxious or having panic attacks?   No  Do you have any concerns with your use of alcohol or other drugs? No    Social History     Tobacco Use    Smoking status: Former     Current packs/day: 0.00     Types: Cigarettes     Quit date: 1995     Years since quittin.9    Smokeless tobacco: Never   Vaping Use    Vaping status: Never Used   Substance Use Topics    Alcohol use: Yes     Alcohol/week: 4.0 standard drinks of alcohol     Types: 4 Standard drinks or equivalent per week     Comment: drinks once a week, only on Friday    Drug use: No         2022     4:27 PM 2023     6:35 AM 2024     1:38 PM   PHQ   PHQ-9 Total Score 4 4 1   Q9: Thoughts of better off dead/self-harm past 2 weeks Not at all Not at all Not at all         2020     4:40 PM 2022     4:27 PM 2023     6:52 AM   VISHNU-7 SCORE   Total Score 0 4 2       ED/UC Followup:    Facility:  [facility]  Date of visit: 24  Reason for visit: fall- right hip, right knee, low back  Current Status:  knee and hip - bad arthritis  Bruises healing      Medication Followup of celebrex  Taking Medication as prescribed: yes-  Side Effects:  None  Medication Helping Symptoms:  Yes,        2024   General Health   How would you rate your overall physical health? Good   Feel stress (tense, anxious, or unable to sleep) Only a little   (!) STRESS CONCERN      2024   Nutrition   Diet: Regular (no restrictions)         2024   Exercise   Days per week of moderate/strenous exercise Patient declined   Average minutes spent exercising at this level Patient declined         2024   Social Factors   Frequency of gathering with friends or relatives Once a week   Worry food won't last until get money to buy more No   Food not last or not have enough money for food? No   Do you have housing?  Yes   Are you worried about losing your housing? No   Lack of transportation? No   Unable to get utilities (heat,electricity)?  No         2024   Fall Risk   Fallen 2 or more times in the past year? Yes    Yes   Trouble with walking or balance? No    No   Gait Speed Test (Document in seconds) 4.9   Gait Speed Test Interpretation Less than or equal to 5.00 seconds - PASS          2024   Activities of Daily Living- Home Safety   Needs help with the following daily activites None of the above   Safety concerns in the home None of the above         2024   Dental   Dentist two times every year? Yes         2024   Hearing Screening   Hearing concerns? (!) I NEED TO ASK PEOPLE TO SPEAK UP OR REPEAT THEMSELVES.    (!) IT'S HARD TO FOLLOW A CONVERSATION IN A NOISY RESTAURANT OR CROWDED ROOM.    (!) TROUBLE UNDERSTANDING SOFT OR WHISPERED SPEECH.         2024   Driving Risk Screening   Patient/family members have concerns about driving No         2024   General Alertness/Fatigue Screening   Have you been more tired than usual lately? No         2024   Urinary Incontinence Screening   Bothered by leaking urine in past 6 months No         2024   TB Screening   Were you born outside of the [place]? No       Today's PHQ-9 Score:       2024     1:38 PM   PHQ-9 SCORE   PHQ-9 Total Score MyChart 1 (Minimal depression)   PHQ-9 Total Score 1         2024   Substance Use   Alcohol more than 3/day or more than 7/wk No   Do you have a current opioid prescription? No   How severe/bad is pain from 1 to 10? 6/10   Do you use any other substances recreationally? No     Social History     Tobacco Use    Smoking status: Former     Current packs/day: 0.00     Types: Cigarettes     Quit date: 1995     Years since quittin.9    Smokeless tobacco: Never   Vaping Use    Vaping status: Never Used   Substance Use Topics    Alcohol use: Yes     Alcohol/week: 4.0 standard drinks of alcohol     Types: 4 Standard drinks or equivalent per week     Comment: drinks once a week, only on Friday    Drug use: No       Last PSA:   PSA   Date  Value Ref Range Status   04/15/2019 0.39 0 - 4 ug/L Final     Comment:     Assay Method:  Chemiluminescence using Siemens Vista analyzer     ASCVD Risk   The ASCVD Risk score (Carolyn CANAS, et al., 2019) failed to calculate for the following reasons:    Cannot find a previous HDL lab    Cannot find a previous total cholesterol lab            Reviewed and updated as needed this visit by Provider                      Current providers sharing in care for this patient include:  Patient Care Team:  Shraddha Loyd APRN CNP as PCP - General (Nurse Practitioner)  Shraddha Loyd APRN CNP as Assigned PCP  Juliet Krueger APRN CNP as Assigned Pulmonology Provider    The following health maintenance items are reviewed in Epic and correct as of today:  Health Maintenance   Topic Date Due    RSV VACCINE (Pregnancy & 60+) (1 - 1-dose 60+ series) Never done    LIPID  04/15/2020    BMP  05/26/2023    COVID-19 Vaccine (6 - 2023-24 season) 03/27/2024    COLORECTAL CANCER SCREENING  04/10/2024    ADVANCE CARE PLANNING  04/15/2024    MEDICARE ANNUAL WELLNESS VISIT  05/03/2024    AORTIC ANEURYSM SCREENING (SYSTEM ASSIGNED)  05/03/2024    ANNUAL REVIEW OF HM ORDERS  06/05/2024    Pneumococcal Vaccine: 65+ Years (1 of 1 - PCV) 05/03/2024    PHQ-9  12/07/2024    GLUCOSE  05/26/2025    FALL RISK ASSESSMENT  06/07/2025    DTAP/TDAP/TD IMMUNIZATION (3 - Td or Tdap) 06/05/2033    HEPATITIS C SCREENING  Completed    HIV SCREENING  Completed    DEPRESSION ACTION PLAN  Completed    INFLUENZA VACCINE  Completed    ZOSTER IMMUNIZATION  Completed    IPV IMMUNIZATION  Aged Out    HPV IMMUNIZATION  Aged Out    MENINGITIS IMMUNIZATION  Aged Out    RSV MONOCLONAL ANTIBODY  Aged Out         Review of Systems  Constitutional, neuro, ENT, endocrine, pulmonary, cardiac, gastrointestinal, genitourinary, musculoskeletal, integument and psychiatric systems are negative, except as otherwise noted.     Objective    Exam  /82 (BP  "Location: Right arm, Patient Position: Sitting, Cuff Size: Adult Large)   Pulse 78   Temp 98.5  F (36.9  C) (Tympanic)   Resp 12   Ht 1.626 m (5' 4\")   Wt 82.6 kg (182 lb)   SpO2 97%   BMI 31.24 kg/m     Estimated body mass index is 31.24 kg/m  as calculated from the following:    Height as of this encounter: 1.626 m (5' 4\").    Weight as of this encounter: 82.6 kg (182 lb).    Physical Exam  GENERAL: alert and no distress  EYES: Eyes grossly normal to inspection, PERRL and conjunctivae and sclerae normal  HENT: ear canals and TM's normal, nose and mouth without ulcers or lesions  NECK: no adenopathy, no asymmetry, masses, or scars  RESP: lungs clear to auscultation - no rales, rhonchi or wheezes  CV: regular rate and rhythm, normal S1 S2, no S3 or S4, no murmur, click or rub, no peripheral edema  ABDOMEN: soft, nontender, no hepatosplenomegaly, no masses and bowel sounds normal  MS: no gross musculoskeletal defects noted, no edema  SKIN: no suspicious lesions or rashes  NEURO: Normal strength and tone, mentation intact and speech normal  PSYCH: mentation appears normal, affect normal/bright         6/7/2024   Mini Cog   Clock Draw Score 2 Normal   3 Item Recall 3 objects recalled   Mini Cog Total Score 5             Signed Electronically by: STU Evangelista CNP    "

## 2024-06-07 NOTE — NURSING NOTE
Prior to immunization administration, verified patients identity using patient s name and date of birth. Please see Immunization Activity for additional information.     Screening Questionnaire for Adult Immunization    Are you sick today?   No   Do you have allergies to medications, food, a vaccine component or latex?   Yes   Have you ever had a serious reaction after receiving a vaccination?   No   Do you have a long-term health problem with heart, lung, kidney, or metabolic disease (e.g., diabetes), asthma, a blood disorder, no spleen, complement component deficiency, a cochlear implant, or a spinal fluid leak?  Are you on long-term aspirin therapy?   No   Do you have cancer, leukemia, HIV/AIDS, or any other immune system problem?   No   Do you have a parent, brother, or sister with an immune system problem?   Don't Know   In the past 3 months, have you taken medications that affect  your immune system, such as prednisone, other steroids, or anticancer drugs; drugs for the treatment of rheumatoid arthritis, Crohn s disease, or psoriasis; or have you had radiation treatments?   No   Have you had a seizure, or a brain or other nervous system problem?   No   During the past year, have you received a transfusion of blood or blood    products, or been given immune (gamma) globulin or antiviral drug?   No   For women: Are you pregnant or is there a chance you could become       pregnant during the next month?   No   Have you received any vaccinations in the past 4 weeks?   No     Immunization questionnaire was positive for at least one answer.  Notified provider; no contraindications.      Patient instructed to remain in clinic for 15 minutes afterwards, and to report any adverse reactions.     Screening performed by Sonal Salas CMA on 6/7/2024 at 2:36 PM.

## 2024-06-07 NOTE — LETTER
June 17, 2024      Kwabena Richards  [address] [place] [id number]        Dear ,    We are writing to inform you of your test results.    Overall your cholesterol is good.  Your triglycerides are mildly elevated. Aerobic exercise will help lower this, as well as weight loss. You should follow a low fat diet, limiting refined sugars, fruit juices, and high fructose beverages; we suggest increased consumption of fish that contain high amounts of omega-3 fatty acids.     Kidney function and electrolytes are normal.  PSA (prostate cancer screening test) was normal.      Resulted Orders   Lipid panel reflex to direct LDL Non-fasting   Result Value Ref Range    Cholesterol 163 <200 mg/dL    Triglycerides 183 (H) <150 mg/dL    Direct Measure HDL 51 >=40 mg/dL    LDL Cholesterol Calculated 75 <=100 mg/dL    Non HDL Cholesterol 112 <130 mg/dL    Patient Fasting > 8hrs? No     Narrative    Cholesterol  Desirable:  <200 mg/dL    Triglycerides  Normal:  Less than 150 mg/dL  Borderline High:  150-199 mg/dL  High:  200-499 mg/dL  Very High:  Greater than or equal to 500 mg/dL    Direct Measure HDL  Female:  Greater than or equal to 50 mg/dL   Male:  Greater than or equal to 40 mg/dL    LDL Cholesterol  Desirable:  <100mg/dL  Above Desirable:  100-129 mg/dL   Borderline High:  130-159 mg/dL   High:  160-189 mg/dL   Very High:  >= 190 mg/dL    Non HDL Cholesterol  Desirable:  130 mg/dL  Above Desirable:  130-159 mg/dL  Borderline High:  160-189 mg/dL  High:  190-219 mg/dL  Very High:  Greater than or equal to 220 mg/dL   BASIC METABOLIC PANEL   Result Value Ref Range    Sodium 138 135 - 145 mmol/L      Comment:      Reference intervals for this test were updated on 09/26/2023 to more accurately reflect our healthy population. There may be differences in the flagging of prior results with similar values performed with this method. Interpretation of those prior results can be made in the context of the updated  reference intervals.     Potassium 4.1 3.4 - 5.3 mmol/L    Chloride 103 98 - 107 mmol/L    Carbon Dioxide (CO2) 21 (L) 22 - 29 mmol/L    Anion Gap 14 7 - 15 mmol/L    Urea Nitrogen 18.8 8.0 - 23.0 mg/dL    Creatinine 1.12 0.67 - 1.17 mg/dL    GFR Estimate 73 >60 mL/min/1.73m2    Calcium 9.5 8.8 - 10.2 mg/dL    Glucose 105 (H) 70 - 99 mg/dL    Patient Fasting > 8hrs? No    PSA, screen   Result Value Ref Range    Prostate Specific Antigen Screen 0.98 0.00 - 4.50 ng/mL    Narrative    This result is obtained using the Roche Elecsys total PSA method on the benedicto e601 immunoassay analyzer. Results obtained with different assay methods or kits cannot be used interchangeably.       If you have any questions or concerns, please call the clinic at the number listed above.       Sincerely,      STU Evangelista CNP

## 2024-06-19 ENCOUNTER — TELEPHONE (OUTPATIENT)
Dept: FAMILY MEDICINE | Facility: CLINIC | Age: 64
End: 2024-06-19
Payer: MEDICARE

## 2024-06-19 NOTE — TELEPHONE ENCOUNTER
Patient Quality Outreach    Patient is due for the following:   Colon Cancer Screening    Next Steps:   No follow up needed at this time.    Type of outreach:    Chart review performed, no outreach needed.  Colon cancer screening ordered at recent appointment 6/7/24      Questions for provider review:    None           Sonal Salas, CMA

## 2024-08-14 DIAGNOSIS — I10 ESSENTIAL HYPERTENSION: ICD-10-CM

## 2024-08-14 RX ORDER — LOSARTAN POTASSIUM 100 MG/1
100 TABLET ORAL DAILY
Qty: 90 TABLET | Refills: 3 | OUTPATIENT
Start: 2024-08-14

## 2024-09-06 ENCOUNTER — DOCUMENTATION ONLY (OUTPATIENT)
Dept: SLEEP MEDICINE | Facility: CLINIC | Age: 64
End: 2024-09-06
Payer: MEDICARE

## 2024-09-06 DIAGNOSIS — G47.33 OSA (OBSTRUCTIVE SLEEP APNEA): Primary | ICD-10-CM

## 2024-09-26 ENCOUNTER — TRANSFERRED RECORDS (OUTPATIENT)
Dept: HEALTH INFORMATION MANAGEMENT | Facility: CLINIC | Age: 64
End: 2024-09-26
Payer: MEDICARE

## 2024-10-15 ENCOUNTER — TELEPHONE (OUTPATIENT)
Dept: FAMILY MEDICINE | Facility: CLINIC | Age: 64
End: 2024-10-15
Payer: MEDICARE

## 2024-10-15 DIAGNOSIS — Z12.11 SCREEN FOR COLON CANCER: Primary | ICD-10-CM

## 2024-10-15 NOTE — TELEPHONE ENCOUNTER
Patient Quality Outreach    Patient is due for the following:   Colon Cancer Screening    Next Steps:   Return FIT kit -  mailed to patient today.    Type of outreach:    Sent letter.      Questions for provider review:    None           Sonal Salas, CMA

## 2024-10-15 NOTE — LETTER
October 15, 2024    To  Kwabena LOPEZ Maurice  [address] [place] [id number]    Your team at [facility] cares about your health. We have reviewed your chart and based on our findings; we are making the following recommendations to better manage your health.     You are in particular need of attention regarding the following:     Colon cancer is now the second leading cause of cancer-related deaths in the [place] for both men and women and there are over 130,000 new cases and 50,000 deaths per year from colon cancer. Colonoscopies can prevent 90-95% of these deaths. Problem lesions can be removed before they ever become cancer. This test is not only looking for cancer, but also getting rid of precancerous lesions.   Contact your clinic to schedule/ your FIT Test.     Please return enclosed FIT kit    If you have already completed these items, please contact the clinic via phone or   [facility] so your care team can review and update your records. Thank you for   choosing [facility] for your healthcare needs. For any questions,   concerns, or to schedule an appointment please contact our clinic.    Healthy Regards,      Your [facility] Care Team

## 2024-11-14 ENCOUNTER — TRANSFERRED RECORDS (OUTPATIENT)
Dept: HEALTH INFORMATION MANAGEMENT | Facility: CLINIC | Age: 64
End: 2024-11-14
Payer: MEDICARE

## 2025-01-29 PROCEDURE — 82274 ASSAY TEST FOR BLOOD FECAL: CPT | Performed by: NURSE PRACTITIONER

## 2025-02-04 ENCOUNTER — LAB (OUTPATIENT)
Dept: LAB | Facility: CLINIC | Age: 65
End: 2025-02-04
Payer: COMMERCIAL

## 2025-02-04 DIAGNOSIS — Z12.11 SCREEN FOR COLON CANCER: ICD-10-CM

## 2025-02-05 LAB — HEMOCCULT STL QL IA: NEGATIVE

## 2025-02-19 ENCOUNTER — TELEPHONE (OUTPATIENT)
Dept: FAMILY MEDICINE | Facility: CLINIC | Age: 65
End: 2025-02-19
Payer: MEDICARE

## 2025-02-19 NOTE — TELEPHONE ENCOUNTER
Prior Authorization Retail Medication Request    Medication/Dose: Bupropion tab 150 mg  Diagnosis and ICD code (if different than what is on RX):    New/renewal/insurance change PA/secondary ins. PA:  Previously Tried and Failed:  cymbalta; bupriprion 150 or 300 mg  Rationale:  temporary fill letter received due to only covering 30 per 30 days    Insurance   Primary: [employer] [facility]   Insurance ID:  [id number]       Secondary (if applicable):  Insurance ID:      Pharmacy Information (if different than what is on RX)  Name:    Phone:    Fax:    Clinic Information  Preferred routing pool for dept communication: [phone] [id number]

## 2025-02-22 NOTE — TELEPHONE ENCOUNTER
Central Prior Authorization Team   Phone: [phone]    PA Initiation    Medication: Bupropion tab 150 mg  Insurance Company: [employer] - Phone [phone] Fax [phone]  Pharm[facility]illing the Rx[id number] - [place], [place] - [facility] [address] [facility]  Filling Pharmacy Phone: [phone]  Filling Pharmacy Fax:    Start Date: 2/22/2025

## 2025-02-22 NOTE — TELEPHONE ENCOUNTER
Prior Authorization Approval    Authorization Effective Date: 1/1/2025  Authorization Expiration Date: 12/31/2025  Medication: Bupropion tab 150 mg  Approved Dose/Quantity:   Reference #:     Insurance Company: [employer] - Phone [phone] Fax [phone]  Expected CoPay:       CoPay Card Available:      Foundation Assistance Needed:    Which Pharmacy is filling the prescription (Not needed for infusion/clinic administered): [id number] [place] [address] [address] [facility]  Pharmacy Notified:  yes  Patient Notified:  yes- Pharmacy will contact patient when ready to /ship

## 2025-06-17 NOTE — PATIENT INSTRUCTIONS

## 2025-07-01 DIAGNOSIS — M17.0 PRIMARY OSTEOARTHRITIS OF BOTH KNEES: ICD-10-CM

## 2025-07-01 NOTE — TELEPHONE ENCOUNTER
Requested Prescriptions   Pending Prescriptions Disp Refills    celecoxib (CELEBREX) 100 MG capsule 180 capsule 3     Sig: Take 2 capsules (200 mg) by mouth daily.       NSAID Medications Failed - 7/1/2025  2:46 PM        Failed - Most recent blood pressure under 140/90 in past 12 months     BP Readings from Last 3 Encounters:   06/07/24 118/82   05/25/24 107/74   10/18/23 (!) 131/93       No data recorded            Failed - Patient is age 6-64 years        Failed - Normal CBC on file in past 12 months     No lab results found.              Failed - Always Fail Criteria - Chart Review Required     Validate Diagnosis. If the medication is requested for an acute flare of a chronic pain associated with a musculoskeletal or rheumatologic condition; okay to authorize if all other criteria are met. If not, then forward to provider for review.          Failed - Normal GFR on file in past 12 months     Recent Labs   Lab Test 06/07/24  1449 05/26/22  1630 04/15/19  0812   GFRESTIMATED 73   < > >90   GFRESTBLACK  --   --  >90    < > = values in this interval not displayed.             Failed - Recent (12 month) or future (90 days) visit with authorizing provider's specialty (provided they have been seen in the past 15 months)     The patient must have completed an in-person or virtual visit within the past 12 months or has a future visit scheduled within the next 90 days with the authorizing provider s specialty.  Urgent care and e-visits do not qualify as an office visit for this protocol.          Passed - Medication is active on med list and the sig matches. RN to manually verify dose and sig if red X/fail.     If the protocol passes (green check), you do not need to verify med dose and sig.    A prescription matches if they are the same clinical intention.    For Example: once daily and every morning are the same.    The protocol can not identify upper and lower case letters as matching and will fail.     For Example:  Take 1 tablet (50 mg) by mouth daily     TAKE 1 TABLET (50 MG) BY MOUTH DAILY    For all fails (red x), verify dose and sig.    If the refill does match what is on file, the RN can still proceed to approve the refill request.       If they do not match, route to the appropriate provider.

## 2025-07-02 RX ORDER — CELECOXIB 100 MG/1
200 CAPSULE ORAL DAILY
Qty: 180 CAPSULE | Refills: 0 | Status: SHIPPED | OUTPATIENT
Start: 2025-07-02

## 2025-07-02 NOTE — TELEPHONE ENCOUNTER
Pt called and said thank you for the refill but he has to go to [facility] from now on due to insurance change. He wanted you to know that he is sad that he has to change and will miss you and Sonal.    Let pt know he will need to contact [facility] for future refills.

## 2025-07-31 ENCOUNTER — TELEPHONE (OUTPATIENT)
Dept: FAMILY MEDICINE | Facility: CLINIC | Age: 65
End: 2025-07-31
Payer: COMMERCIAL

## 2025-07-31 NOTE — TELEPHONE ENCOUNTER
Patient Quality Outreach    Patient is due for the following:   Diabetes -  Diabetic Follow-Up Visit  Hypertension -  Hypertension follow-up visit  Physical Preventive Adult Physical    Action(s) Taken:   Patient is not an established [facility] patient per patient; does not want any further outreach.  Had to leave [facility] due to insurance issues  Type of outreach:    Chart review performed, no outreach needed.    Questions for provider review:    None         Sonal Salas, [facility]  Chart routed to None.